# Patient Record
Sex: MALE | Race: WHITE | Employment: FULL TIME | URBAN - NONMETROPOLITAN AREA
[De-identification: names, ages, dates, MRNs, and addresses within clinical notes are randomized per-mention and may not be internally consistent; named-entity substitution may affect disease eponyms.]

---

## 2017-01-01 ENCOUNTER — HOSPITAL ENCOUNTER (OUTPATIENT)
Dept: CARDIAC REHAB | Age: 52
Discharge: HOME | End: 2017-01-31

## 2017-01-03 ENCOUNTER — OFFICE VISIT (OUTPATIENT)
Dept: INTERNAL MEDICINE CLINIC | Age: 52
End: 2017-01-03

## 2017-01-03 ENCOUNTER — HOSPITAL ENCOUNTER (OUTPATIENT)
Dept: GENERAL RADIOLOGY | Age: 52
Discharge: HOME | End: 2017-01-03
Attending: INTERNAL MEDICINE | Admitting: INTERNAL MEDICINE

## 2017-01-03 VITALS
TEMPERATURE: 97.7 F | HEART RATE: 72 BPM | OXYGEN SATURATION: 96 % | WEIGHT: 243 LBS | RESPIRATION RATE: 13 BRPM | SYSTOLIC BLOOD PRESSURE: 128 MMHG | BODY MASS INDEX: 34.79 KG/M2 | HEIGHT: 70 IN | DIASTOLIC BLOOD PRESSURE: 70 MMHG

## 2017-01-03 DIAGNOSIS — I10 ESSENTIAL HYPERTENSION: ICD-10-CM

## 2017-01-03 DIAGNOSIS — J06.9 ACUTE UPPER RESPIRATORY INFECTIONS OF OTHER MULTIPLE SITES: ICD-10-CM

## 2017-01-03 DIAGNOSIS — E78.2 MIXED HYPERLIPIDEMIA: ICD-10-CM

## 2017-01-03 DIAGNOSIS — J06.9 UPPER RESPIRATORY TRACT INFECTION, UNSPECIFIED TYPE: Primary | ICD-10-CM

## 2017-01-03 DIAGNOSIS — R07.9 CHEST PAIN, UNSPECIFIED TYPE: ICD-10-CM

## 2017-01-03 LAB
BASOPHILS ABSOLUTE: 0.1 K/CU MM
BASOPHILS RELATIVE PERCENT: 0.9 % (ref 0–1)
D DIMER: 312 NG/ML(DDU)
DIFFERENTIAL TYPE: ABNORMAL
EOSINOPHILS ABSOLUTE: 0.2 K/CU MM
EOSINOPHILS RELATIVE PERCENT: 2.8 % (ref 0–3)
HCT VFR BLD CALC: 46.2 % (ref 42–52)
HEMOGLOBIN: 15.2 GM/DL (ref 13.5–18)
IMMATURE NEUTROPHIL %: 0.7 % (ref 0–0.43)
LYMPHOCYTES ABSOLUTE: 2.8 K/CU MM
LYMPHOCYTES RELATIVE PERCENT: 34.5 % (ref 24–44)
MCH RBC QN AUTO: 29.6 PG (ref 27–31)
MCHC RBC AUTO-ENTMCNC: 32.9 % (ref 32–36)
MCV RBC AUTO: 90.1 FL (ref 78–100)
MONOCYTES ABSOLUTE: 0.8 K/CU MM
MONOCYTES RELATIVE PERCENT: 9.2 % (ref 0–4)
PDW BLD-RTO: 12.1 % (ref 11.7–14.9)
PLATELET # BLD: 338 K/CU MM (ref 140–440)
PMV BLD AUTO: 8.9 FL (ref 7.5–11.1)
RBC # BLD: 5.13 M/CU MM (ref 4.6–6.2)
SEGMENTED NEUTROPHILS ABSOLUTE COUNT: 4.2 K/CU MM
SEGMENTED NEUTROPHILS RELATIVE PERCENT: 51.9 % (ref 36–66)
TOTAL IMMATURE NEUTOROPHIL: 0.06 K/CU MM
WBC # BLD: 8.1 K/CU MM (ref 4–10.5)

## 2017-01-03 PROCEDURE — 93000 ELECTROCARDIOGRAM COMPLETE: CPT | Performed by: INTERNAL MEDICINE

## 2017-01-03 PROCEDURE — 99213 OFFICE O/P EST LOW 20 MIN: CPT | Performed by: INTERNAL MEDICINE

## 2017-01-03 RX ORDER — CARVEDILOL 6.25 MG/1
6.25 TABLET ORAL 2 TIMES DAILY
Qty: 60 TABLET | Refills: 5 | Status: SHIPPED | OUTPATIENT
Start: 2017-01-03 | End: 2017-07-12 | Stop reason: SDUPTHER

## 2017-01-03 RX ORDER — ASPIRIN 81 MG/1
81 TABLET ORAL DAILY
Qty: 30 TABLET | Refills: 5 | Status: SHIPPED | OUTPATIENT
Start: 2017-01-03

## 2017-01-03 RX ORDER — HYDROCHLOROTHIAZIDE 12.5 MG/1
12.5 CAPSULE, GELATIN COATED ORAL DAILY
Qty: 30 CAPSULE | Refills: 5 | Status: SHIPPED | OUTPATIENT
Start: 2017-01-03 | End: 2017-02-02 | Stop reason: ALTCHOICE

## 2017-01-03 RX ORDER — AZITHROMYCIN 250 MG/1
TABLET, FILM COATED ORAL
Qty: 1 PACKET | Refills: 0 | Status: SHIPPED | OUTPATIENT
Start: 2017-01-03 | End: 2017-01-11 | Stop reason: ALTCHOICE

## 2017-01-03 RX ORDER — ATORVASTATIN CALCIUM 40 MG/1
40 TABLET, FILM COATED ORAL DAILY
Qty: 30 TABLET | Refills: 5 | Status: SHIPPED | OUTPATIENT
Start: 2017-01-03 | End: 2017-07-12 | Stop reason: SDUPTHER

## 2017-01-03 ASSESSMENT — PATIENT HEALTH QUESTIONNAIRE - PHQ9
2. FEELING DOWN, DEPRESSED OR HOPELESS: 1
SUM OF ALL RESPONSES TO PHQ9 QUESTIONS 1 & 2: 2
SUM OF ALL RESPONSES TO PHQ QUESTIONS 1-9: 2
1. LITTLE INTEREST OR PLEASURE IN DOING THINGS: 1

## 2017-01-11 ENCOUNTER — OFFICE VISIT (OUTPATIENT)
Dept: INTERNAL MEDICINE CLINIC | Age: 52
End: 2017-01-11

## 2017-01-11 VITALS
TEMPERATURE: 97.5 F | SYSTOLIC BLOOD PRESSURE: 118 MMHG | BODY MASS INDEX: 34.76 KG/M2 | HEIGHT: 70 IN | RESPIRATION RATE: 16 BRPM | HEART RATE: 72 BPM | DIASTOLIC BLOOD PRESSURE: 68 MMHG | OXYGEN SATURATION: 94 % | WEIGHT: 242.8 LBS

## 2017-01-11 DIAGNOSIS — M25.511 RIGHT SHOULDER PAIN, UNSPECIFIED CHRONICITY: ICD-10-CM

## 2017-01-11 DIAGNOSIS — I25.10 CORONARY ARTERY DISEASE INVOLVING NATIVE CORONARY ARTERY OF NATIVE HEART WITHOUT ANGINA PECTORIS: ICD-10-CM

## 2017-01-11 DIAGNOSIS — E78.2 MIXED HYPERLIPIDEMIA: ICD-10-CM

## 2017-01-11 DIAGNOSIS — I77.9 BILATERAL CAROTID ARTERY DISEASE (HCC): ICD-10-CM

## 2017-01-11 DIAGNOSIS — Z23 NEED FOR PROPHYLACTIC VACCINATION AND INOCULATION AGAINST INFLUENZA: ICD-10-CM

## 2017-01-11 DIAGNOSIS — Z12.5 SCREENING FOR PROSTATE CANCER: ICD-10-CM

## 2017-01-11 DIAGNOSIS — R07.9 CHEST PAIN, UNSPECIFIED TYPE: Primary | ICD-10-CM

## 2017-01-11 DIAGNOSIS — I10 ESSENTIAL HYPERTENSION: ICD-10-CM

## 2017-01-11 DIAGNOSIS — R91.1 INCIDENTAL LUNG NODULE: ICD-10-CM

## 2017-01-11 DIAGNOSIS — Z72.0 TOBACCO ABUSE DISORDER: ICD-10-CM

## 2017-01-11 PROCEDURE — 99213 OFFICE O/P EST LOW 20 MIN: CPT | Performed by: INTERNAL MEDICINE

## 2017-01-11 PROCEDURE — 90630 INFLUENZA, QUADRIVALENT, INTRADERMAL, PF (FLUZONE QUADRIVALENT PF ID): CPT | Performed by: INTERNAL MEDICINE

## 2017-01-11 PROCEDURE — 90471 IMMUNIZATION ADMIN: CPT | Performed by: INTERNAL MEDICINE

## 2017-01-11 RX ORDER — AMLODIPINE BESYLATE 5 MG/1
5 TABLET ORAL DAILY
Qty: 30 TABLET | Refills: 5 | Status: SHIPPED | OUTPATIENT
Start: 2017-01-11 | End: 2017-07-12 | Stop reason: SDUPTHER

## 2017-01-11 ASSESSMENT — ENCOUNTER SYMPTOMS
EYES NEGATIVE: 1
RESPIRATORY NEGATIVE: 1
SHORTNESS OF BREATH: 0
GASTROINTESTINAL NEGATIVE: 1

## 2017-02-01 ENCOUNTER — HOSPITAL ENCOUNTER (OUTPATIENT)
Dept: CARDIAC REHAB | Age: 52
Discharge: HOME | End: 2017-02-28

## 2017-02-02 ENCOUNTER — OFFICE VISIT (OUTPATIENT)
Dept: CARDIOLOGY CLINIC | Age: 52
End: 2017-02-02

## 2017-02-02 VITALS
DIASTOLIC BLOOD PRESSURE: 72 MMHG | BODY MASS INDEX: 34.22 KG/M2 | RESPIRATION RATE: 16 BRPM | SYSTOLIC BLOOD PRESSURE: 112 MMHG | WEIGHT: 239 LBS | HEIGHT: 70 IN | HEART RATE: 68 BPM

## 2017-02-02 DIAGNOSIS — M10.272 ACUTE DRUG-INDUCED GOUT OF LEFT ANKLE: ICD-10-CM

## 2017-02-02 DIAGNOSIS — Z72.0 TOBACCO ABUSE DISORDER: ICD-10-CM

## 2017-02-02 DIAGNOSIS — I10 ESSENTIAL HYPERTENSION: ICD-10-CM

## 2017-02-02 DIAGNOSIS — I25.10 CORONARY ARTERY DISEASE INVOLVING NATIVE CORONARY ARTERY OF NATIVE HEART WITHOUT ANGINA PECTORIS: Primary | ICD-10-CM

## 2017-02-02 DIAGNOSIS — R91.1 INCIDENTAL LUNG NODULE: ICD-10-CM

## 2017-02-02 DIAGNOSIS — I77.9 BILATERAL CAROTID ARTERY DISEASE (HCC): ICD-10-CM

## 2017-02-02 DIAGNOSIS — E78.2 MIXED HYPERLIPIDEMIA: ICD-10-CM

## 2017-02-02 PROBLEM — M10.9 ACUTE GOUT OF LEFT ANKLE: Status: ACTIVE | Noted: 2017-02-02

## 2017-02-02 PROCEDURE — 99214 OFFICE O/P EST MOD 30 MIN: CPT | Performed by: INTERNAL MEDICINE

## 2017-02-02 RX ORDER — LISINOPRIL 5 MG/1
5 TABLET ORAL DAILY
Qty: 30 TABLET | Refills: 3 | Status: SHIPPED | OUTPATIENT
Start: 2017-02-02 | End: 2017-07-12 | Stop reason: SDUPTHER

## 2017-02-02 RX ORDER — COLCHICINE 0.6 MG/1
0.6 TABLET ORAL DAILY
Qty: 30 TABLET | Refills: 3 | Status: SHIPPED | OUTPATIENT
Start: 2017-02-02 | End: 2017-04-14 | Stop reason: ALTCHOICE

## 2017-02-03 ENCOUNTER — HOSPITAL ENCOUNTER (OUTPATIENT)
Dept: GENERAL RADIOLOGY | Age: 52
Discharge: HOME | End: 2017-02-03
Attending: INTERNAL MEDICINE | Admitting: INTERNAL MEDICINE

## 2017-02-03 DIAGNOSIS — M10.272 ACUTE DRUG-INDUCED GOUT INVOLVING TOE OF LEFT FOOT: ICD-10-CM

## 2017-02-03 LAB
ALBUMIN SERPL-MCNC: 4.3 GM/DL (ref 3.4–5)
ALP BLD-CCNC: 64 IU/L (ref 40–129)
ALT SERPL-CCNC: 17 U/L (ref 10–40)
ANION GAP SERPL CALCULATED.3IONS-SCNC: 5 MMOL/L (ref 4–16)
AST SERPL-CCNC: 20 IU/L (ref 15–37)
BILIRUB SERPL-MCNC: 0.8 MG/DL (ref 0–1)
BUN BLDV-MCNC: 18 MG/DL (ref 6–23)
CALCIUM SERPL-MCNC: 9.5 MG/DL (ref 8.3–10.6)
CHLORIDE BLD-SCNC: 103 MMOL/L (ref 99–110)
CHOLESTEROL: 115 MG/DL
CO2: 35 MMOL/L (ref 21–32)
CREAT SERPL-MCNC: 1.1 MG/DL (ref 0.9–1.3)
GFR AFRICAN AMERICAN: >60 ML/MIN/1.73M2
GFR NON-AFRICAN AMERICAN: >60 ML/MIN/1.73M2
GLUCOSE FASTING: 110 MG/DL (ref 70–99)
HDLC SERPL-MCNC: 43 MG/DL
LDL CHOLESTEROL DIRECT: 63 MG/DL
POTASSIUM SERPL-SCNC: 4.3 MMOL/L (ref 3.5–5.1)
PROSTATE SPECIFIC ANTIGEN: 0.54 NG/ML (ref 0–4)
SODIUM BLD-SCNC: 143 MMOL/L (ref 135–145)
TOTAL PROTEIN: 6.4 GM/DL (ref 6.4–8.2)
TRIGL SERPL-MCNC: 64 MG/DL
URIC ACID: 8.4 MG/DL (ref 3.5–7.2)

## 2017-02-14 ENCOUNTER — TELEPHONE (OUTPATIENT)
Dept: CARDIOLOGY CLINIC | Age: 52
End: 2017-02-14

## 2017-02-22 ENCOUNTER — TELEPHONE (OUTPATIENT)
Dept: CARDIOLOGY CLINIC | Age: 52
End: 2017-02-22

## 2017-03-01 ENCOUNTER — HOSPITAL ENCOUNTER (OUTPATIENT)
Dept: CARDIAC REHAB | Age: 52
Discharge: HOME | End: 2017-03-31
Admitting: INTERNAL MEDICINE

## 2017-04-01 ENCOUNTER — HOSPITAL ENCOUNTER (OUTPATIENT)
Dept: CARDIAC REHAB | Age: 52
Discharge: HOME | End: 2017-04-04
Attending: INTERNAL MEDICINE | Admitting: INTERNAL MEDICINE

## 2017-04-14 ENCOUNTER — OFFICE VISIT (OUTPATIENT)
Dept: INTERNAL MEDICINE CLINIC | Age: 52
End: 2017-04-14

## 2017-04-14 VITALS
WEIGHT: 243.2 LBS | OXYGEN SATURATION: 94 % | SYSTOLIC BLOOD PRESSURE: 134 MMHG | HEIGHT: 70 IN | BODY MASS INDEX: 34.82 KG/M2 | RESPIRATION RATE: 12 BRPM | HEART RATE: 76 BPM | DIASTOLIC BLOOD PRESSURE: 74 MMHG | TEMPERATURE: 97.8 F

## 2017-04-14 DIAGNOSIS — E78.2 MIXED HYPERLIPIDEMIA: ICD-10-CM

## 2017-04-14 DIAGNOSIS — Z72.0 TOBACCO ABUSE DISORDER: ICD-10-CM

## 2017-04-14 DIAGNOSIS — M10.272 ACUTE DRUG-INDUCED GOUT OF LEFT ANKLE: ICD-10-CM

## 2017-04-14 DIAGNOSIS — I77.9 BILATERAL CAROTID ARTERY DISEASE (HCC): ICD-10-CM

## 2017-04-14 DIAGNOSIS — I10 ESSENTIAL HYPERTENSION: Primary | ICD-10-CM

## 2017-04-14 DIAGNOSIS — R91.1 INCIDENTAL LUNG NODULE: ICD-10-CM

## 2017-04-14 DIAGNOSIS — N20.0 KIDNEY STONE ON RIGHT SIDE: ICD-10-CM

## 2017-04-14 DIAGNOSIS — I25.10 CORONARY ARTERY DISEASE INVOLVING NATIVE CORONARY ARTERY OF NATIVE HEART WITHOUT ANGINA PECTORIS: ICD-10-CM

## 2017-04-14 PROCEDURE — 99214 OFFICE O/P EST MOD 30 MIN: CPT | Performed by: INTERNAL MEDICINE

## 2017-04-14 ASSESSMENT — ENCOUNTER SYMPTOMS
EYES NEGATIVE: 1
RESPIRATORY NEGATIVE: 1
GASTROINTESTINAL NEGATIVE: 1

## 2017-04-19 ENCOUNTER — HOSPITAL ENCOUNTER (OUTPATIENT)
Dept: CT IMAGING | Age: 52
Discharge: HOME | End: 2017-05-18
Attending: INTERNAL MEDICINE | Admitting: INTERNAL MEDICINE

## 2017-05-12 ENCOUNTER — HOSPITAL ENCOUNTER (OUTPATIENT)
Dept: LAB | Age: 52
Discharge: HOME | End: 2017-05-12
Attending: INTERNAL MEDICINE | Admitting: INTERNAL MEDICINE

## 2017-05-12 LAB
ANION GAP SERPL CALCULATED.3IONS-SCNC: 9 MMOL/L (ref 4–16)
BUN BLDV-MCNC: 20 MG/DL (ref 6–23)
CALCIUM SERPL-MCNC: 9.5 MG/DL (ref 8.3–10.6)
CHLORIDE BLD-SCNC: 106 MMOL/L (ref 99–110)
CO2: 29 MMOL/L (ref 21–32)
CREAT SERPL-MCNC: 1 MG/DL (ref 0.9–1.3)
GFR AFRICAN AMERICAN: >60 ML/MIN/1.73M2
GFR NON-AFRICAN AMERICAN: >60 ML/MIN/1.73M2
GLUCOSE BLD-MCNC: 116 MG/DL (ref 70–140)
POTASSIUM SERPL-SCNC: 4.1 MMOL/L (ref 3.5–5.1)
SODIUM BLD-SCNC: 144 MMOL/L (ref 135–145)
URIC ACID: 7.6 MG/DL (ref 3.5–7.2)

## 2017-07-12 RX ORDER — CARVEDILOL 6.25 MG/1
6.25 TABLET ORAL 2 TIMES DAILY
Qty: 60 TABLET | Refills: 5 | Status: SHIPPED | OUTPATIENT
Start: 2017-07-12 | End: 2018-07-05 | Stop reason: SDUPTHER

## 2017-07-12 RX ORDER — ATORVASTATIN CALCIUM 40 MG/1
40 TABLET, FILM COATED ORAL DAILY
Qty: 30 TABLET | Refills: 5 | Status: SHIPPED | OUTPATIENT
Start: 2017-07-12 | End: 2018-07-05 | Stop reason: SDUPTHER

## 2017-07-12 RX ORDER — LISINOPRIL 5 MG/1
5 TABLET ORAL DAILY
Qty: 30 TABLET | Refills: 5 | Status: SHIPPED | OUTPATIENT
Start: 2017-07-12 | End: 2018-03-12 | Stop reason: SDUPTHER

## 2017-07-12 RX ORDER — AMLODIPINE BESYLATE 5 MG/1
5 TABLET ORAL DAILY
Qty: 30 TABLET | Refills: 5 | Status: SHIPPED | OUTPATIENT
Start: 2017-07-12 | End: 2017-11-09 | Stop reason: SDUPTHER

## 2017-08-23 ENCOUNTER — OFFICE VISIT (OUTPATIENT)
Dept: INTERNAL MEDICINE CLINIC | Age: 52
End: 2017-08-23

## 2017-08-23 VITALS
DIASTOLIC BLOOD PRESSURE: 78 MMHG | WEIGHT: 234 LBS | HEART RATE: 76 BPM | HEIGHT: 70 IN | BODY MASS INDEX: 33.5 KG/M2 | OXYGEN SATURATION: 95 % | RESPIRATION RATE: 12 BRPM | SYSTOLIC BLOOD PRESSURE: 134 MMHG | TEMPERATURE: 98 F

## 2017-08-23 DIAGNOSIS — E78.2 MIXED HYPERLIPIDEMIA: ICD-10-CM

## 2017-08-23 DIAGNOSIS — I77.9 BILATERAL CAROTID ARTERY DISEASE (HCC): ICD-10-CM

## 2017-08-23 DIAGNOSIS — N20.0 KIDNEY STONE ON RIGHT SIDE: ICD-10-CM

## 2017-08-23 DIAGNOSIS — I25.10 CORONARY ARTERY DISEASE INVOLVING NATIVE CORONARY ARTERY OF NATIVE HEART WITHOUT ANGINA PECTORIS: ICD-10-CM

## 2017-08-23 DIAGNOSIS — I10 ESSENTIAL HYPERTENSION: ICD-10-CM

## 2017-08-23 DIAGNOSIS — R91.1 INCIDENTAL LUNG NODULE: ICD-10-CM

## 2017-08-23 DIAGNOSIS — Z72.0 TOBACCO ABUSE DISORDER: ICD-10-CM

## 2017-08-23 DIAGNOSIS — M10.272 ACUTE DRUG-INDUCED GOUT OF LEFT ANKLE: ICD-10-CM

## 2017-08-23 DIAGNOSIS — M25.511 RIGHT SHOULDER PAIN, UNSPECIFIED CHRONICITY: ICD-10-CM

## 2017-08-23 PROCEDURE — 99213 OFFICE O/P EST LOW 20 MIN: CPT | Performed by: INTERNAL MEDICINE

## 2017-08-23 ASSESSMENT — ENCOUNTER SYMPTOMS
ORTHOPNEA: 0
COUGH: 0
SPUTUM PRODUCTION: 0
ABDOMINAL PAIN: 0
GASTROINTESTINAL NEGATIVE: 1
VOMITING: 0

## 2017-09-05 ENCOUNTER — HOSPITAL ENCOUNTER (OUTPATIENT)
Dept: CT IMAGING | Age: 52
Discharge: HOME | End: 2017-09-05
Attending: INTERNAL MEDICINE | Admitting: INTERNAL MEDICINE

## 2017-09-05 DIAGNOSIS — R91.1 COIN LESION: ICD-10-CM

## 2017-09-05 LAB
ALBUMIN SERPL-MCNC: 4.3 GM/DL (ref 3.4–5)
ALP BLD-CCNC: 71 IU/L (ref 40–129)
ALT SERPL-CCNC: 19 U/L (ref 10–40)
ANION GAP SERPL CALCULATED.3IONS-SCNC: 7 MMOL/L (ref 4–16)
AST SERPL-CCNC: 17 IU/L (ref 15–37)
BILIRUB SERPL-MCNC: 0.3 MG/DL (ref 0–1)
BUN BLDV-MCNC: 14 MG/DL (ref 6–23)
CALCIUM SERPL-MCNC: 9.3 MG/DL (ref 8.3–10.6)
CHLORIDE BLD-SCNC: 103 MMOL/L (ref 99–110)
CHOLESTEROL, FASTING: 154 MG/DL
CO2: 36 MMOL/L (ref 21–32)
CREAT SERPL-MCNC: 1 MG/DL (ref 0.9–1.3)
GFR AFRICAN AMERICAN: >60 ML/MIN/1.73M2
GFR NON-AFRICAN AMERICAN: >60 ML/MIN/1.73M2
GLUCOSE FASTING: 119 MG/DL (ref 70–99)
HDLC SERPL-MCNC: 45 MG/DL
LDL CHOLESTEROL DIRECT: 87 MG/DL
POTASSIUM SERPL-SCNC: 4 MMOL/L (ref 3.5–5.1)
SODIUM BLD-SCNC: 146 MMOL/L (ref 135–145)
TOTAL PROTEIN: 6.6 GM/DL (ref 6.4–8.2)
TRIGLYCERIDE, FASTING: 119 MG/DL

## 2017-11-09 RX ORDER — AMLODIPINE BESYLATE 5 MG/1
5 TABLET ORAL DAILY
Qty: 30 TABLET | Refills: 5 | Status: SHIPPED | OUTPATIENT
Start: 2017-11-09 | End: 2018-07-05 | Stop reason: SDUPTHER

## 2017-12-04 ENCOUNTER — OFFICE VISIT (OUTPATIENT)
Dept: INTERNAL MEDICINE CLINIC | Age: 52
End: 2017-12-04

## 2017-12-04 VITALS
SYSTOLIC BLOOD PRESSURE: 118 MMHG | BODY MASS INDEX: 34.03 KG/M2 | WEIGHT: 237.2 LBS | HEART RATE: 72 BPM | TEMPERATURE: 98.2 F | DIASTOLIC BLOOD PRESSURE: 80 MMHG | RESPIRATION RATE: 16 BRPM

## 2017-12-04 DIAGNOSIS — I10 ESSENTIAL HYPERTENSION: Primary | ICD-10-CM

## 2017-12-04 DIAGNOSIS — E78.2 MIXED HYPERLIPIDEMIA: ICD-10-CM

## 2017-12-04 DIAGNOSIS — Z23 NEED FOR PROPHYLACTIC VACCINATION AND INOCULATION AGAINST INFLUENZA: ICD-10-CM

## 2017-12-04 DIAGNOSIS — Z72.89 OTHER PROBLEMS RELATED TO LIFESTYLE: ICD-10-CM

## 2017-12-04 DIAGNOSIS — Z12.5 SCREENING FOR PROSTATE CANCER: ICD-10-CM

## 2017-12-04 DIAGNOSIS — I77.9 BILATERAL CAROTID ARTERY DISEASE (HCC): ICD-10-CM

## 2017-12-04 DIAGNOSIS — R91.1 INCIDENTAL LUNG NODULE: ICD-10-CM

## 2017-12-04 DIAGNOSIS — N20.0 KIDNEY STONE ON RIGHT SIDE: ICD-10-CM

## 2017-12-04 DIAGNOSIS — Z72.0 TOBACCO ABUSE DISORDER: ICD-10-CM

## 2017-12-04 DIAGNOSIS — I25.10 CORONARY ARTERY DISEASE INVOLVING NATIVE CORONARY ARTERY OF NATIVE HEART WITHOUT ANGINA PECTORIS: ICD-10-CM

## 2017-12-04 DIAGNOSIS — M10.272 ACUTE DRUG-INDUCED GOUT OF LEFT ANKLE: ICD-10-CM

## 2017-12-04 PROBLEM — M10.9 ACUTE GOUT OF LEFT ANKLE: Status: RESOLVED | Noted: 2017-02-02 | Resolved: 2017-12-04

## 2017-12-04 PROCEDURE — 90471 IMMUNIZATION ADMIN: CPT | Performed by: INTERNAL MEDICINE

## 2017-12-04 PROCEDURE — G8598 ASA/ANTIPLAT THER USED: HCPCS | Performed by: INTERNAL MEDICINE

## 2017-12-04 PROCEDURE — G8484 FLU IMMUNIZE NO ADMIN: HCPCS | Performed by: INTERNAL MEDICINE

## 2017-12-04 PROCEDURE — 90686 IIV4 VACC NO PRSV 0.5 ML IM: CPT | Performed by: INTERNAL MEDICINE

## 2017-12-04 PROCEDURE — 99213 OFFICE O/P EST LOW 20 MIN: CPT | Performed by: INTERNAL MEDICINE

## 2017-12-04 PROCEDURE — 3017F COLORECTAL CA SCREEN DOC REV: CPT | Performed by: INTERNAL MEDICINE

## 2017-12-04 PROCEDURE — G8427 DOCREV CUR MEDS BY ELIG CLIN: HCPCS | Performed by: INTERNAL MEDICINE

## 2017-12-04 PROCEDURE — G8417 CALC BMI ABV UP PARAM F/U: HCPCS | Performed by: INTERNAL MEDICINE

## 2017-12-04 PROCEDURE — 4004F PT TOBACCO SCREEN RCVD TLK: CPT | Performed by: INTERNAL MEDICINE

## 2017-12-04 ASSESSMENT — ENCOUNTER SYMPTOMS
RESPIRATORY NEGATIVE: 1
COUGH: 0
EYES NEGATIVE: 1
GASTROINTESTINAL NEGATIVE: 1
SPUTUM PRODUCTION: 0
SHORTNESS OF BREATH: 0
HEMOPTYSIS: 0

## 2017-12-04 NOTE — PATIENT INSTRUCTIONS
Vaccine Information Sheet, \"Influenza - Inactivated\"  given to Ángela Murcia, or parent/legal guardian of  Ángela Murcia and verbalized understanding. Patient responses:    Have you ever had a reaction to a flu vaccine? No   Are you able to eat eggs without adverse effects? Yes  Do you have any current illness? no  Have you ever had [place] Syndrome?  no  Flu vaccine given per order. Please see immunization tab.

## 2017-12-04 NOTE — PROGRESS NOTES
Young Salgaod  Patient's  is 1965  Seen in office on 2017      SUBJECTIVE:  John Bernstein is a 45 y. o.year old male presents today   Chief Complaint   Patient presents with    Hypertension    Hyperlipidemia     Pt is here for f/u of HTN, HLD  Pt has cold congestion and that is getting better  No fever or chills  Pt had lung nodule followed for 2 years no change in nodule there fore benign per last CT chest in   Pt has not seen Dr Josephine Hudson in f/u for carotid art disease and is going to call and make appt. Taking medications regularly. No side effects noted. Review of Systems   Constitutional: Negative. Negative for chills, fever and weight loss. HENT: Negative. Eyes: Negative. Respiratory: Negative. Negative for cough, hemoptysis, sputum production and shortness of breath. Cardiovascular: Negative. Gastrointestinal: Negative. Genitourinary: Negative. Skin: Negative. Neurological: Negative. Endo/Heme/Allergies: Negative. Psychiatric/Behavioral: Negative. OBJECTIVE: /80   Pulse 72   Temp 98.2 °F (36.8 °C) (Oral)   Resp 16   Wt 237 lb 3.2 oz (107.6 kg)   BMI 34.03 kg/m²     Wt Readings from Last 3 Encounters:   17 237 lb 3.2 oz (107.6 kg)   17 234 lb (106.1 kg)   17 243 lb 3.2 oz (110.3 kg)      GENERAL:  Alert, oriented, pleasant, in no apparent distress. HEENT:  Conjunctiva pink, no scleral icterus. ENT clear. NECK:  Supple. No jugular venous distention noted. No masses felt,  CARDIOVASCULAR:  Normal S1 and S2    PULMONARY:  No respiratory distress. No wheezes or rales. ABDOMEN:  Soft and non-tender,no masses  or organomegaly. EXTREMITIES:  No cyanosis, clubbing, or significant edema. SKIN: Skin is warm and dry. NEUROLOGICAL:  Cranial nerves II through XII are grossly intact. IMPRESSION:    Encounter Diagnoses   Name Primary?     Essential hypertension Yes    Coronary artery disease s/p MI, CABG x 4    

## 2017-12-04 NOTE — ASSESSMENT & PLAN NOTE
Hyperlipidemia is stable. Follow low cholesterol diet. Continue current treatment. Pt is on atorvastatin.

## 2017-12-04 NOTE — ASSESSMENT & PLAN NOTE
Ct shoulder showed 4 mm nodule in RUL in 4/15  CT scan of the chest showed in a 5 mm nodule stable  CT chest 9/16: 5 mm nodule stable . CT chest 9/17 : stable nodule for 2 years. . There for benign  Impression   1. Stable right upper lobe nodule since 09/2015 compatible with a benign   etiology   2.  Bronchial wall thickening should be correlated with any clinical findings   of acute or chronic bronchitis

## 2018-02-01 ENCOUNTER — HOSPITAL ENCOUNTER (OUTPATIENT)
Dept: LAB | Age: 53
Discharge: HOME | End: 2018-02-01
Attending: INTERNAL MEDICINE | Admitting: INTERNAL MEDICINE

## 2018-02-01 LAB
ALBUMIN SERPL-MCNC: 4.3 GM/DL (ref 3.4–5)
ALP BLD-CCNC: 68 IU/L (ref 40–129)
ALT SERPL-CCNC: 15 U/L (ref 10–40)
ANION GAP SERPL CALCULATED.3IONS-SCNC: 6 MMOL/L (ref 4–16)
AST SERPL-CCNC: 15 IU/L (ref 15–37)
BILIRUB SERPL-MCNC: 0.5 MG/DL (ref 0–1)
BUN BLDV-MCNC: 17 MG/DL (ref 6–23)
CALCIUM SERPL-MCNC: 9.8 MG/DL (ref 8.3–10.6)
CHLORIDE BLD-SCNC: 103 MMOL/L (ref 99–110)
CHOLESTEROL, FASTING: 119 MG/DL
CO2: 38 MMOL/L (ref 21–32)
CREAT SERPL-MCNC: 1 MG/DL (ref 0.9–1.3)
GFR AFRICAN AMERICAN: >60 ML/MIN/1.73M2
GFR NON-AFRICAN AMERICAN: >60 ML/MIN/1.73M2
GLUCOSE FASTING: 117 MG/DL (ref 70–99)
HDLC SERPL-MCNC: 40 MG/DL
HEPATITIS C ANTIBODY: NON REACTIVE
HIV SCREEN: NON REACTIVE
LDL CHOLESTEROL DIRECT: 69 MG/DL
POTASSIUM SERPL-SCNC: 4.9 MMOL/L (ref 3.5–5.1)
PROSTATE SPECIFIC ANTIGEN: 0.48 NG/ML (ref 0–4)
SODIUM BLD-SCNC: 147 MMOL/L (ref 135–145)
TOTAL PROTEIN: 6.9 GM/DL (ref 6.4–8.2)
TRIGLYCERIDE, FASTING: 102 MG/DL

## 2018-03-12 RX ORDER — LISINOPRIL 5 MG/1
5 TABLET ORAL DAILY
Qty: 30 TABLET | Refills: 5 | Status: SHIPPED | OUTPATIENT
Start: 2018-03-12 | End: 2018-07-05 | Stop reason: SDUPTHER

## 2018-04-04 ENCOUNTER — OFFICE VISIT (OUTPATIENT)
Dept: INTERNAL MEDICINE CLINIC | Age: 53
End: 2018-04-04

## 2018-04-04 VITALS
OXYGEN SATURATION: 93 % | HEART RATE: 86 BPM | HEIGHT: 70 IN | TEMPERATURE: 97.9 F | RESPIRATION RATE: 16 BRPM | SYSTOLIC BLOOD PRESSURE: 126 MMHG | WEIGHT: 238.2 LBS | DIASTOLIC BLOOD PRESSURE: 80 MMHG | BODY MASS INDEX: 34.1 KG/M2

## 2018-04-04 DIAGNOSIS — I10 ESSENTIAL HYPERTENSION: Primary | ICD-10-CM

## 2018-04-04 DIAGNOSIS — E78.2 MIXED HYPERLIPIDEMIA: ICD-10-CM

## 2018-04-04 DIAGNOSIS — I25.10 CORONARY ARTERY DISEASE INVOLVING NATIVE CORONARY ARTERY OF NATIVE HEART WITHOUT ANGINA PECTORIS: ICD-10-CM

## 2018-04-04 DIAGNOSIS — N20.0 KIDNEY STONES: ICD-10-CM

## 2018-04-04 DIAGNOSIS — N20.0 KIDNEY STONE ON RIGHT SIDE: ICD-10-CM

## 2018-04-04 DIAGNOSIS — R91.1 INCIDENTAL LUNG NODULE: ICD-10-CM

## 2018-04-04 DIAGNOSIS — I77.9 BILATERAL CAROTID ARTERY DISEASE (HCC): ICD-10-CM

## 2018-04-04 DIAGNOSIS — Z72.0 TOBACCO ABUSE DISORDER: ICD-10-CM

## 2018-04-04 LAB
BILIRUBIN, POC: NEGATIVE
BLOOD URINE, POC: ABNORMAL
CLARITY, POC: CLEAR
COLOR, POC: YELLOW
GLUCOSE URINE, POC: NEGATIVE
KETONES, POC: NEGATIVE
LEUKOCYTE EST, POC: ABNORMAL
NITRITE, POC: NEGATIVE
PH, POC: 5.5
PROTEIN, POC: ABNORMAL
SPECIFIC GRAVITY, POC: 1.02
UROBILINOGEN, POC: ABNORMAL

## 2018-04-04 PROCEDURE — G8599 NO ASA/ANTIPLAT THER USE RNG: HCPCS | Performed by: INTERNAL MEDICINE

## 2018-04-04 PROCEDURE — G8417 CALC BMI ABV UP PARAM F/U: HCPCS | Performed by: INTERNAL MEDICINE

## 2018-04-04 PROCEDURE — 3017F COLORECTAL CA SCREEN DOC REV: CPT | Performed by: INTERNAL MEDICINE

## 2018-04-04 PROCEDURE — 81002 URINALYSIS NONAUTO W/O SCOPE: CPT | Performed by: INTERNAL MEDICINE

## 2018-04-04 PROCEDURE — G8427 DOCREV CUR MEDS BY ELIG CLIN: HCPCS | Performed by: INTERNAL MEDICINE

## 2018-04-04 PROCEDURE — 4004F PT TOBACCO SCREEN RCVD TLK: CPT | Performed by: INTERNAL MEDICINE

## 2018-04-04 PROCEDURE — 99213 OFFICE O/P EST LOW 20 MIN: CPT | Performed by: INTERNAL MEDICINE

## 2018-04-04 ASSESSMENT — PATIENT HEALTH QUESTIONNAIRE - PHQ9
SUM OF ALL RESPONSES TO PHQ QUESTIONS 1-9: 2
2. FEELING DOWN, DEPRESSED OR HOPELESS: 1
SUM OF ALL RESPONSES TO PHQ9 QUESTIONS 1 & 2: 2
1. LITTLE INTEREST OR PLEASURE IN DOING THINGS: 1

## 2018-04-04 ASSESSMENT — ENCOUNTER SYMPTOMS
GASTROINTESTINAL NEGATIVE: 1
RESPIRATORY NEGATIVE: 1
EYES NEGATIVE: 1

## 2018-04-09 ENCOUNTER — HOSPITAL ENCOUNTER (OUTPATIENT)
Dept: ULTRASOUND IMAGING | Age: 53
Discharge: HOME | End: 2018-04-09
Attending: INTERNAL MEDICINE | Admitting: INTERNAL MEDICINE

## 2018-04-09 DIAGNOSIS — N20.0 CALCULUS OF KIDNEY: ICD-10-CM

## 2018-04-09 DIAGNOSIS — N20.0 KIDNEY STONE: ICD-10-CM

## 2018-04-10 ENCOUNTER — TELEPHONE (OUTPATIENT)
Dept: INTERNAL MEDICINE CLINIC | Age: 53
End: 2018-04-10

## 2018-04-12 ENCOUNTER — HOSPITAL ENCOUNTER (OUTPATIENT)
Dept: CT IMAGING | Age: 53
Discharge: HOME | End: 2018-04-12
Attending: INTERNAL MEDICINE | Admitting: INTERNAL MEDICINE

## 2018-04-12 ENCOUNTER — OFFICE VISIT (OUTPATIENT)
Dept: INTERNAL MEDICINE CLINIC | Age: 53
End: 2018-04-12

## 2018-04-12 VITALS
BODY MASS INDEX: 34.24 KG/M2 | DIASTOLIC BLOOD PRESSURE: 80 MMHG | RESPIRATION RATE: 16 BRPM | TEMPERATURE: 97.9 F | SYSTOLIC BLOOD PRESSURE: 122 MMHG | OXYGEN SATURATION: 93 % | WEIGHT: 238.6 LBS | HEART RATE: 68 BPM

## 2018-04-12 DIAGNOSIS — R10.9 FLANK PAIN: Primary | ICD-10-CM

## 2018-04-12 DIAGNOSIS — R10.9 FLANK PAIN: ICD-10-CM

## 2018-04-12 DIAGNOSIS — R31.0 GROSS HEMATURIA: ICD-10-CM

## 2018-04-12 DIAGNOSIS — R10.9 ABDOMINAL PAIN: ICD-10-CM

## 2018-04-12 LAB
BILIRUBIN, POC: NEGATIVE
BLOOD URINE, POC: ABNORMAL
CLARITY, POC: CLEAR
COLOR, POC: YELLOW
GLUCOSE URINE, POC: NEGATIVE
KETONES, POC: NEGATIVE
LEUKOCYTE EST, POC: NEGATIVE
NITRITE, POC: ABNORMAL
PH, POC: 5
PROTEIN, POC: ABNORMAL
SPECIFIC GRAVITY, POC: 1.02
UROBILINOGEN, POC: ABNORMAL

## 2018-04-12 PROCEDURE — G8427 DOCREV CUR MEDS BY ELIG CLIN: HCPCS | Performed by: INTERNAL MEDICINE

## 2018-04-12 PROCEDURE — 81002 URINALYSIS NONAUTO W/O SCOPE: CPT | Performed by: INTERNAL MEDICINE

## 2018-04-12 PROCEDURE — G8417 CALC BMI ABV UP PARAM F/U: HCPCS | Performed by: INTERNAL MEDICINE

## 2018-04-12 PROCEDURE — 3017F COLORECTAL CA SCREEN DOC REV: CPT | Performed by: INTERNAL MEDICINE

## 2018-04-12 PROCEDURE — 4004F PT TOBACCO SCREEN RCVD TLK: CPT | Performed by: INTERNAL MEDICINE

## 2018-04-12 PROCEDURE — G8599 NO ASA/ANTIPLAT THER USE RNG: HCPCS | Performed by: INTERNAL MEDICINE

## 2018-04-12 PROCEDURE — 99213 OFFICE O/P EST LOW 20 MIN: CPT | Performed by: INTERNAL MEDICINE

## 2018-04-12 RX ORDER — HYDROCODONE BITARTRATE AND ACETAMINOPHEN 5; 325 MG/1; MG/1
1 TABLET ORAL EVERY 6 HOURS PRN
Qty: 25 TABLET | Refills: 0 | Status: SHIPPED | OUTPATIENT
Start: 2018-04-12 | End: 2018-04-19

## 2018-04-18 PROBLEM — I71.40 ABDOMINAL AORTIC ANEURYSM (AAA) WITHOUT RUPTURE: Status: ACTIVE | Noted: 2018-04-18

## 2018-04-18 RX ORDER — CIPROFLOXACIN 500 MG/1
500 TABLET, FILM COATED ORAL 2 TIMES DAILY
Qty: 14 TABLET | Refills: 0 | Status: SHIPPED | OUTPATIENT
Start: 2018-04-18 | End: 2018-04-25

## 2018-04-24 ENCOUNTER — OFFICE VISIT (OUTPATIENT)
Dept: INTERNAL MEDICINE CLINIC | Age: 53
End: 2018-04-24

## 2018-04-24 VITALS
TEMPERATURE: 97.9 F | SYSTOLIC BLOOD PRESSURE: 124 MMHG | BODY MASS INDEX: 34.58 KG/M2 | HEART RATE: 78 BPM | OXYGEN SATURATION: 92 % | DIASTOLIC BLOOD PRESSURE: 80 MMHG | WEIGHT: 241 LBS | RESPIRATION RATE: 16 BRPM

## 2018-04-24 DIAGNOSIS — I77.9 BILATERAL CAROTID ARTERY DISEASE (HCC): ICD-10-CM

## 2018-04-24 DIAGNOSIS — N20.0 KIDNEY STONE ON RIGHT SIDE: Primary | ICD-10-CM

## 2018-04-24 DIAGNOSIS — I71.40 ABDOMINAL AORTIC ANEURYSM (AAA) WITHOUT RUPTURE: ICD-10-CM

## 2018-04-24 DIAGNOSIS — E78.2 MIXED HYPERLIPIDEMIA: ICD-10-CM

## 2018-04-24 DIAGNOSIS — Z72.0 TOBACCO ABUSE DISORDER: ICD-10-CM

## 2018-04-24 DIAGNOSIS — I10 ESSENTIAL HYPERTENSION: ICD-10-CM

## 2018-04-24 DIAGNOSIS — I25.10 CORONARY ARTERY DISEASE INVOLVING NATIVE CORONARY ARTERY OF NATIVE HEART WITHOUT ANGINA PECTORIS: ICD-10-CM

## 2018-04-24 PROCEDURE — G8427 DOCREV CUR MEDS BY ELIG CLIN: HCPCS | Performed by: INTERNAL MEDICINE

## 2018-04-24 PROCEDURE — 99213 OFFICE O/P EST LOW 20 MIN: CPT | Performed by: INTERNAL MEDICINE

## 2018-04-24 PROCEDURE — G8417 CALC BMI ABV UP PARAM F/U: HCPCS | Performed by: INTERNAL MEDICINE

## 2018-04-24 PROCEDURE — 3017F COLORECTAL CA SCREEN DOC REV: CPT | Performed by: INTERNAL MEDICINE

## 2018-04-24 PROCEDURE — 4004F PT TOBACCO SCREEN RCVD TLK: CPT | Performed by: INTERNAL MEDICINE

## 2018-04-24 PROCEDURE — G8599 NO ASA/ANTIPLAT THER USE RNG: HCPCS | Performed by: INTERNAL MEDICINE

## 2018-05-15 ENCOUNTER — HOSPITAL ENCOUNTER (OUTPATIENT)
Dept: GENERAL RADIOLOGY | Age: 53
Discharge: HOME | End: 2018-05-15
Attending: SPECIALIST | Admitting: SPECIALIST

## 2018-05-15 DIAGNOSIS — N20.0 KIDNEY STONE: ICD-10-CM

## 2018-06-07 ENCOUNTER — HOSPITAL ENCOUNTER (OUTPATIENT)
Dept: GENERAL RADIOLOGY | Age: 53
Discharge: HOME | End: 2018-06-07
Attending: SPECIALIST | Admitting: SPECIALIST

## 2018-06-07 DIAGNOSIS — N20.0 KIDNEY STONE: ICD-10-CM

## 2018-07-05 RX ORDER — ATORVASTATIN CALCIUM 40 MG/1
40 TABLET, FILM COATED ORAL DAILY
Qty: 30 TABLET | Refills: 5 | Status: SHIPPED | OUTPATIENT
Start: 2018-07-05 | End: 2019-01-24 | Stop reason: SDUPTHER

## 2018-07-05 RX ORDER — CARVEDILOL 6.25 MG/1
6.25 TABLET ORAL 2 TIMES DAILY
Qty: 60 TABLET | Refills: 5 | Status: SHIPPED | OUTPATIENT
Start: 2018-07-05 | End: 2019-01-24 | Stop reason: SDUPTHER

## 2018-07-05 RX ORDER — AMLODIPINE BESYLATE 5 MG/1
5 TABLET ORAL DAILY
Qty: 30 TABLET | Refills: 5 | Status: SHIPPED | OUTPATIENT
Start: 2018-07-05 | End: 2019-01-24 | Stop reason: SDUPTHER

## 2018-07-05 RX ORDER — LISINOPRIL 5 MG/1
5 TABLET ORAL DAILY
Qty: 30 TABLET | Refills: 5 | Status: SHIPPED | OUTPATIENT
Start: 2018-07-05 | End: 2019-01-24 | Stop reason: SDUPTHER

## 2018-07-10 PROBLEM — R29.818 SUSPECTED SLEEP APNEA: Status: ACTIVE | Noted: 2018-07-10

## 2018-07-13 ENCOUNTER — TELEPHONE (OUTPATIENT)
Dept: CARDIOLOGY CLINIC | Age: 53
End: 2018-07-13

## 2018-07-19 ENCOUNTER — PROCEDURE VISIT (OUTPATIENT)
Dept: CARDIOLOGY CLINIC | Age: 53
End: 2018-07-19

## 2018-07-19 DIAGNOSIS — I65.21 CAROTID STENOSIS, RIGHT: Primary | ICD-10-CM

## 2018-07-19 PROCEDURE — 93880 EXTRACRANIAL BILAT STUDY: CPT | Performed by: INTERNAL MEDICINE

## 2018-07-20 ENCOUNTER — TELEPHONE (OUTPATIENT)
Dept: CARDIOLOGY CLINIC | Age: 53
End: 2018-07-20

## 2018-07-24 ENCOUNTER — OFFICE VISIT (OUTPATIENT)
Dept: CARDIOLOGY CLINIC | Age: 53
End: 2018-07-24

## 2018-07-24 VITALS
BODY MASS INDEX: 32.71 KG/M2 | RESPIRATION RATE: 16 BRPM | SYSTOLIC BLOOD PRESSURE: 114 MMHG | HEART RATE: 72 BPM | WEIGHT: 228 LBS | DIASTOLIC BLOOD PRESSURE: 76 MMHG

## 2018-07-24 DIAGNOSIS — Z01.810 PRE-OPERATIVE CARDIOVASCULAR EXAMINATION: ICD-10-CM

## 2018-07-24 DIAGNOSIS — I77.9 BILATERAL CAROTID ARTERY DISEASE (HCC): ICD-10-CM

## 2018-07-24 DIAGNOSIS — I25.10 CORONARY ARTERY DISEASE INVOLVING NATIVE CORONARY ARTERY OF NATIVE HEART WITHOUT ANGINA PECTORIS: Primary | ICD-10-CM

## 2018-07-24 DIAGNOSIS — I10 ESSENTIAL HYPERTENSION: ICD-10-CM

## 2018-07-24 DIAGNOSIS — Z72.0 TOBACCO ABUSE DISORDER: ICD-10-CM

## 2018-07-24 DIAGNOSIS — I71.40 ABDOMINAL AORTIC ANEURYSM (AAA) WITHOUT RUPTURE: ICD-10-CM

## 2018-07-24 DIAGNOSIS — E78.2 MIXED HYPERLIPIDEMIA: ICD-10-CM

## 2018-07-24 PROCEDURE — 3017F COLORECTAL CA SCREEN DOC REV: CPT | Performed by: INTERNAL MEDICINE

## 2018-07-24 PROCEDURE — G8427 DOCREV CUR MEDS BY ELIG CLIN: HCPCS | Performed by: INTERNAL MEDICINE

## 2018-07-24 PROCEDURE — 4004F PT TOBACCO SCREEN RCVD TLK: CPT | Performed by: INTERNAL MEDICINE

## 2018-07-24 PROCEDURE — 99214 OFFICE O/P EST MOD 30 MIN: CPT | Performed by: INTERNAL MEDICINE

## 2018-07-24 PROCEDURE — G8599 NO ASA/ANTIPLAT THER USE RNG: HCPCS | Performed by: INTERNAL MEDICINE

## 2018-07-24 PROCEDURE — G8417 CALC BMI ABV UP PARAM F/U: HCPCS | Performed by: INTERNAL MEDICINE

## 2018-07-24 RX ORDER — OXYCODONE HYDROCHLORIDE AND ACETAMINOPHEN 5; 325 MG/1; MG/1
TABLET ORAL
Refills: 0 | COMMUNITY
Start: 2018-07-06 | End: 2018-08-03

## 2018-07-24 NOTE — PROGRESS NOTES
CARDIOLOGY  NOTE  Chief Complaint: CAD    HPI:   uQin Lundy is a 45y.o. year old who has history as noted below. He returns for follow-up. Completed cardiac rehab , he is doing very well. He is planned for urology procedure at [facility] for kidney stone resection. He has no  shortness of breath or chest pain. Blood pressure is well-controlled. He wants return to work  Unfortunately continues to drink beer. (5-6 in a week but daily and sometimes at the same time)He is back to smoking . He does heavy equipment building roads     He suffered an NSTEMI in October 2016. Cath revealed 100% occluded RCA, 90% LAD and circumflex disease. He had bypass surgery 9/24/16 . (LIMA to LAd and D1,SVG to PDA,svg to ramus)    Current Outpatient Prescriptions   Medication Sig Dispense Refill    oxyCODONE-acetaminophen (PERCOCET) 5-325 MG per tablet   0    amLODIPine (NORVASC) 5 MG tablet Take 1 tablet by mouth daily 30 tablet 5    atorvastatin (LIPITOR) 40 MG tablet Take 1 tablet by mouth daily 30 tablet 5    carvedilol (COREG) 6.25 MG tablet Take 1 tablet by mouth 2 times daily 60 tablet 5    lisinopril (PRINIVIL;ZESTRIL) 5 MG tablet Take 1 tablet by mouth daily 30 tablet 5    aspirin 81 MG EC tablet Take 1 tablet by mouth daily 30 tablet 5     No current facility-administered medications for this visit. Allergies:   Sulfa antibiotics    Patient History:  Past Medical History:   Diagnosis Date    Acute gout of left ankle 2/2/2017    Resolved. Uric acid improved after d/c HCTZ    Coronary artery disease involving native coronary artery of native heart without angina pectoris 11/04/2016    CABG x 4 on 9/24/16: Dr Artem Candelario H/O colonoscopy 3-30-15    Dr. Dat Sam, rtoin 5 yrs  (polypectomy)    H/O Doppler ultrasound 07/19/2018    Carotid Doppler.   Mild (0-49%) disease of bilateral internal carotid arteries, bilateral common carotid artery bulb to proximal ICA exhibits calcific plaque, left distal ICA end diastolic velocity is elevated but ratio is below 2.0, normal vertebral flow.  Hyperlipidemia     Hypertension     Incidental lung nodule 4 mm RUL. f/u in 10/15 4/22/2015    Ct shoulder showed 4 mm nodule in RUL in 4/15,CT chest 9/15 5mm. f/u in 4/16    Kidney stone on right side 9/29/2016    Large stone on right side on CT abdomen of 10/16    Nephrolithiasis     Right shoulder pain 3/17/2015    Pt had seen Dr. Epifanio Huerta and had arthrogram and CT shoulder. Showed possible tear of biceps tendon. Conservative treatment,     Squamous papilloma 2/23/15    benign left oropharynx/nasopharynx Dr Jacobo Oven abuse disorder 1/20/2015    Pt has quit on 10/20/16     Past Surgical History:   Procedure Laterality Date    CARDIAC SURGERY  10/24/2016     CABG x4 SVG to RCA and Ramus, LIMA to LAD, DIAG sequ    COLONOSCOPY  3/15    Dr. Alvarez Kelley. 5yr    HERNIA REPAIR      umbilical hernia    KIDNEY STONE SURGERY       Family History   Problem Relation Age of Onset   Mora Dakins Cancer Mother         colon    Diabetes Mother     Coronary Art Dis Father         CABG age 72     Social History   Substance Use Topics    Smoking status: Current Some Day Smoker     Packs/day: 1.50     Types: Cigarettes     Last attempt to quit: 10/20/2016    Smokeless tobacco: Never Used    Alcohol use 3.6 oz/week     6 Standard drinks or equivalent per week      Comment: 3 or 4 beers per day, most days        Review of Systems:   · Constitutional: No Fever or Weight Loss   · Eyes: No Decreased Vision  · ENT: No Headaches, Hearing Loss or Vertigo  · Cardiovascular: as per note above   · Respiratory: No cough or wheezing and as per note above.    · Gastrointestinal: No abdominal pain, appetite loss, blood in stools, constipation, diarrhea or heartburn  · Genitourinary: No dysuria, trouble voiding, or hematuria  · Musculoskeletal:  None  · Integumentary: No rash or pruritis  · Neurological: No TIA or stroke symptoms  · Psychiatric: No anxiety or depression  · Endocrine: No malaise, fatigue or temperature intolerance  · Hematologic/Lymphatic: No bleeding problems, blood clots or swollen lymph nodes  · Allergic/Immunologic: No nasal congestion or hives    Objective:      Physical Exam:  /76 (Site: Left Arm, Position: Sitting, Cuff Size: Large Adult)   Pulse 72   Resp 16   Wt 228 lb (103.4 kg)   BMI 32.71 kg/m²   Wt Readings from Last 3 Encounters:   07/24/18 228 lb (103.4 kg)   04/24/18 241 lb (109.3 kg)   04/12/18 238 lb 9.6 oz (108.2 kg)     Body mass index is 32.71 kg/m². Vitals:    07/24/18 1547   BP: 114/76   Pulse: 72   Resp: 16        General Appearance:  No distress, conversant  Constitutional:  Well developed, Well nourished, No acute distress, Non-toxic appearance. HENT:  Normocephalic, Atraumatic, Bilateral external ears normal, Oropharynx moist, No oral exudates, Nose normal. Neck- Normal range of motion, No tenderness, Supple, No stridor,no apical-carotid delay  Eyes:  PERRL, EOMI, Conjunctiva normal, No discharge. Respiratory:  Normal breath sounds, No respiratory distress, No wheezing, No chest tenderness. ,no use of accessory muscles,   Cardiovascular: (PMI) apex non displaced,no lifts no thrills,S1 and S2 audible, No added heart sounds, No signs of ankle edema, or volume overload, No evidence of JVD  GI:  Bowel sounds normal, Soft, No tenderness, No masses, No gross visceromegaly   :  No costovertebral angle tenderness   Musculoskeletal:  No edema, no tenderness, no deformities.  Back- no tenderness, left ankle is swollen, reduce rango of movement, warm to touch, and tender  Integument:  Well hydrated, no rash   Lymphatic:  No lymphadenopathy noted   Neurologic:  Alert & oriented x 3, CN 2-12 normal, normal motor function, normal sensory function, no focal deficits noted   Psychiatric:  Speech and behavior appropriate       Medical decision making and Data review:  DATA:  Lab Results   Component Value Date TROPONINT 0.590 (HH) 10/21/2016     BNP:  No results found for: PROBNP  PT/INR:  No results found for: PTINR  Lab Results   Component Value Date    LABA1C 5.4 10/21/2016    LABA1C 5.6 09/06/2016     Lab Results   Component Value Date    CHOL 115 02/03/2017    TRIG 64 02/03/2017    HDL 40 (L) 02/01/2018    LDLDIRECT 69 02/01/2018     Lab Results   Component Value Date    ALT 15 02/01/2018    AST 15 02/01/2018     Cath 10/2017  Coronary anatomy:   The left main coronary artery has no significant disease.  Left anterior descending artery has a 90 % mis lesion and 50 % proximal lad lesion , he has ostiall 40 % 2nd diagonal  Disease. There are left to right collaterals seen   Circumflex artery has a 90 % mid lesion significant disease.   The right coronary artery is a occluded 100% in proximal segment    Carotid ultrasound 7/19/18      Mild (0-49%) disease of the Bilateral Internal carotid artery.    Bilateral common carotid artery bulb to prox ICA exhibits calcific plaque .    Left distal ICA end diastolic velocity is elevated but ratio is below 2.0.    Normal vertebral flow. All labs, medications and tests reviewed by myself including data and history from outside source , patient and available family . Assessment & Plan:    1. Coronary artery disease s/p MI, CABG x 4    2. Bilateral carotid artery disease (HCC)    3. Abdominal aortic aneurysm (AAA) without rupture ([facility] [address]    4. Mixed hyperlipidemia    5. Essential hypertension    6. Tobacco abuse disorder    7. Pre-operative cardiovascular examination         Coronary artery disease s/p MI, CABG x 4  He is s/p CABG , on 9/24/16 . (LIMA to LAd and D1,SVG to PDA,svg to ramus). Echo is still pending   He is undergoing rehab please doing very well. Mixed hyperlipidemia  He is on lipitor, Profile is acceptable. Tobacco cessation:   Back to smoking again encouraged to quit     Essential hypertension  Acceptable ,.  We will stop hydrochlorothiazide and add

## 2018-07-25 ENCOUNTER — TELEPHONE (OUTPATIENT)
Dept: CARDIOLOGY CLINIC | Age: 53
End: 2018-07-25

## 2018-08-03 ENCOUNTER — OFFICE VISIT (OUTPATIENT)
Dept: INTERNAL MEDICINE CLINIC | Age: 53
End: 2018-08-03

## 2018-08-03 VITALS
HEIGHT: 70 IN | RESPIRATION RATE: 20 BRPM | DIASTOLIC BLOOD PRESSURE: 76 MMHG | OXYGEN SATURATION: 96 % | TEMPERATURE: 98.3 F | WEIGHT: 230 LBS | HEART RATE: 69 BPM | SYSTOLIC BLOOD PRESSURE: 114 MMHG | BODY MASS INDEX: 32.93 KG/M2

## 2018-08-03 DIAGNOSIS — Z72.0 TOBACCO ABUSE DISORDER: ICD-10-CM

## 2018-08-03 DIAGNOSIS — R29.818 SUSPECTED SLEEP APNEA: ICD-10-CM

## 2018-08-03 DIAGNOSIS — I77.9 BILATERAL CAROTID ARTERY DISEASE (HCC): ICD-10-CM

## 2018-08-03 DIAGNOSIS — I10 ESSENTIAL HYPERTENSION: ICD-10-CM

## 2018-08-03 DIAGNOSIS — N20.0 KIDNEY STONE ON RIGHT SIDE: ICD-10-CM

## 2018-08-03 DIAGNOSIS — I25.10 CORONARY ARTERY DISEASE INVOLVING NATIVE CORONARY ARTERY OF NATIVE HEART WITHOUT ANGINA PECTORIS: ICD-10-CM

## 2018-08-03 DIAGNOSIS — E78.2 MIXED HYPERLIPIDEMIA: ICD-10-CM

## 2018-08-03 DIAGNOSIS — I71.40 ABDOMINAL AORTIC ANEURYSM (AAA) WITHOUT RUPTURE: ICD-10-CM

## 2018-08-03 PROCEDURE — G8417 CALC BMI ABV UP PARAM F/U: HCPCS | Performed by: INTERNAL MEDICINE

## 2018-08-03 PROCEDURE — G8427 DOCREV CUR MEDS BY ELIG CLIN: HCPCS | Performed by: INTERNAL MEDICINE

## 2018-08-03 PROCEDURE — G8599 NO ASA/ANTIPLAT THER USE RNG: HCPCS | Performed by: INTERNAL MEDICINE

## 2018-08-03 PROCEDURE — 99213 OFFICE O/P EST LOW 20 MIN: CPT | Performed by: INTERNAL MEDICINE

## 2018-08-03 PROCEDURE — 3017F COLORECTAL CA SCREEN DOC REV: CPT | Performed by: INTERNAL MEDICINE

## 2018-08-03 PROCEDURE — 4004F PT TOBACCO SCREEN RCVD TLK: CPT | Performed by: INTERNAL MEDICINE

## 2018-08-03 RX ORDER — ACETAMINOPHEN 325 MG/1
650 TABLET ORAL
COMMUNITY
Start: 2018-08-01 | End: 2018-08-08

## 2018-08-03 RX ORDER — OXYCODONE HYDROCHLORIDE 5 MG/1
5 TABLET ORAL
COMMUNITY
Start: 2018-08-01 | End: 2018-08-08

## 2018-08-03 RX ORDER — PSEUDOEPHEDRINE HCL 30 MG
100000 TABLET ORAL
COMMUNITY
Start: 2018-08-01 | End: 2018-08-15

## 2018-08-03 NOTE — ASSESSMENT & PLAN NOTE
10/2016: Left ICA 50-69% and right ICA less than 50%   Dr Aida Patel following that. Recheck in 6/17 per pt. But pt states it was cancelled.  Pt will call and set appt  Carotid doppler : bilateral 0-49 %

## 2018-08-03 NOTE — ASSESSMENT & PLAN NOTE
CT abdomen 4/2018 showed 3.2 cm AAA.   It was normal on CT abdomen in 10/2016  Recheck with US in one yea

## 2018-08-03 NOTE — ASSESSMENT & PLAN NOTE
Large stone on right side on CT abdomen of 10/16  Referred to urologist but pt cancelled appt  Had colic saw Dr Kush Reza and then Dr Trudell Hodgkins   Had cysto and percutaneous removal of kid stone in 7/2018.

## 2018-08-03 NOTE — ASSESSMENT & PLAN NOTE
Patient was evaluated with cystoscopy and the anesthesiologist felt patient has sleep apnea. Bang score was 5.  Discussed to get sleep study : wants to wait  Send for apnea link

## 2018-08-03 NOTE — PROGRESS NOTES
as directed sooner if needed. Questions answered and patient verbalizes understanding. Call for any problems, questions, or concerns. Allergies   Allergen Reactions    Sulfa Antibiotics Rash     Current Outpatient Prescriptions   Medication Sig Dispense Refill    docusate (COLACE, DULCOLAX) 100 MG CAPS Take 100,000 mcg by mouth      oxyCODONE (ROXICODONE) 5 MG immediate release tablet Take 5 mg by mouth. Andi Lesches acetaminophen (TYLENOL) 325 MG tablet Take 650 mg by mouth      amLODIPine (NORVASC) 5 MG tablet Take 1 tablet by mouth daily 30 tablet 5    atorvastatin (LIPITOR) 40 MG tablet Take 1 tablet by mouth daily 30 tablet 5    carvedilol (COREG) 6.25 MG tablet Take 1 tablet by mouth 2 times daily 60 tablet 5    lisinopril (PRINIVIL;ZESTRIL) 5 MG tablet Take 1 tablet by mouth daily 30 tablet 5    aspirin 81 MG EC tablet Take 1 tablet by mouth daily 30 tablet 5     No current facility-administered medications for this visit. Past Medical History:   Diagnosis Date    Acute gout of left ankle 2/2/2017    Resolved. Uric acid improved after d/c HCTZ    Coronary artery disease involving native coronary artery of native heart without angina pectoris 11/04/2016    CABG x 4 on 9/24/16: Dr Savanna Rai H/O colonoscopy 3-30-15    Dr. Dena Warren, rtoin 5 yrs  (polypectomy)    H/O Doppler ultrasound 07/19/2018    Carotid Doppler. Mild (0-49%) disease of bilateral internal carotid arteries, bilateral common carotid artery bulb to proximal ICA exhibits calcific plaque, left distal ICA end diastolic velocity is elevated but ratio is below 2.0, normal vertebral flow.  Hyperlipidemia     Hypertension     Incidental lung nodule 4 mm RUL. f/u in 10/15 4/22/2015    Ct shoulder showed 4 mm nodule in RUL in 4/15,CT chest 9/15 5mm.  f/u in 4/16    Kidney stone on right side 9/29/2016    Large stone on right side on CT abdomen of 10/16    Nephrolithiasis     Right shoulder pain 3/17/2015    Pt had seen Dr. Jem Patterson and had arthrogram and CT shoulder. Showed possible tear of biceps tendon. Conservative treatment,     Squamous papilloma 2/23/15    benign left oropharynx/nasopharynx Dr Jenise Bo abuse disorder 1/20/2015    Pt has quit on 10/20/16     Past Surgical History:   Procedure Laterality Date    CARDIAC SURGERY  10/24/2016     CABG x4 SVG to RCA and Ramus, LIMA to LAD, DIAG sequ    COLONOSCOPY  3/15    Dr. Jodie Higgins.  5yr    HERNIA REPAIR      umbilical hernia    KIDNEY STONE SURGERY       Social History   Substance Use Topics    Smoking status: Current Every Day Smoker     Packs/day: 1.00     Types: Cigarettes     Last attempt to quit: 10/20/2016    Smokeless tobacco: Never Used    Alcohol use 3.6 oz/week     6 Standard drinks or equivalent per week      Comment: 3 or 4 beers per day, most days       LAB REVIEW:  CBC:   Lab Results   Component Value Date    WBC 8.1 01/03/2017    HGB 15.2 01/03/2017    HCT 46.2 01/03/2017     01/03/2017     Lipids:   Lab Results   Component Value Date    CHOL 115 02/03/2017    TRIG 64 02/03/2017    HDL 40 (L) 02/01/2018    LDLDIRECT 69 02/01/2018    TRIGLYCFAST 102 02/01/2018     Renal:   Lab Results   Component Value Date    BUN 17 02/01/2018    CREATININE 1.0 02/01/2018     02/01/2018    K 4.9 02/01/2018    ALT 15 02/01/2018    AST 15 02/01/2018    GLUCOSE 116 05/12/2017     PT/INR:   Lab Results   Component Value Date    INR 1.07 10/24/2016     A1C:   Lab Results   Component Value Date    LABA1C 5.4 10/21/2016           Ny Elliott MD, 8/3/2018 , 10:52 AM

## 2018-08-23 PROBLEM — Z01.810 PRE-OPERATIVE CARDIOVASCULAR EXAMINATION: Status: RESOLVED | Noted: 2018-07-24 | Resolved: 2018-08-23

## 2018-11-02 ENCOUNTER — HOSPITAL ENCOUNTER (OUTPATIENT)
Age: 53
Discharge: HOME | End: 2018-11-02
Payer: COMMERCIAL

## 2018-11-02 DIAGNOSIS — N20.0 KIDNEY STONE ON RIGHT SIDE: ICD-10-CM

## 2018-11-02 DIAGNOSIS — I10 ESSENTIAL HYPERTENSION: ICD-10-CM

## 2018-11-02 DIAGNOSIS — E78.2 MIXED HYPERLIPIDEMIA: ICD-10-CM

## 2018-11-02 LAB
ALBUMIN SERPL-MCNC: 4.3 GM/DL (ref 3.4–5)
ALP BLD-CCNC: 61 IU/L (ref 40–129)
ALT SERPL-CCNC: 13 U/L (ref 10–40)
ANION GAP SERPL CALCULATED.3IONS-SCNC: 6 MMOL/L (ref 4–16)
AST SERPL-CCNC: 15 IU/L (ref 15–37)
BASOPHILS ABSOLUTE: 0.1 K/CU MM
BASOPHILS RELATIVE PERCENT: 0.7 % (ref 0–1)
BILIRUB SERPL-MCNC: 0.4 MG/DL (ref 0–1)
BUN BLDV-MCNC: 20 MG/DL (ref 6–23)
CALCIUM SERPL-MCNC: 10.1 MG/DL (ref 8.3–10.6)
CHLORIDE BLD-SCNC: 103 MMOL/L (ref 99–110)
CHOLESTEROL, FASTING: 134 MG/DL
CO2: 34 MMOL/L (ref 21–32)
CREAT SERPL-MCNC: 1 MG/DL (ref 0.9–1.3)
DIFFERENTIAL TYPE: ABNORMAL
EOSINOPHILS ABSOLUTE: 0.2 K/CU MM
EOSINOPHILS RELATIVE PERCENT: 2.8 % (ref 0–3)
GFR AFRICAN AMERICAN: >60 ML/MIN/1.73M2
GFR NON-AFRICAN AMERICAN: >60 ML/MIN/1.73M2
GLUCOSE BLD-MCNC: 122 MG/DL (ref 70–99)
HCT VFR BLD CALC: 48.4 % (ref 42–52)
HDLC SERPL-MCNC: 52 MG/DL
HEMOGLOBIN: 15.3 GM/DL (ref 13.5–18)
IMMATURE NEUTROPHIL %: 0.5 % (ref 0–0.43)
LDL CHOLESTEROL DIRECT: 73 MG/DL
LYMPHOCYTES ABSOLUTE: 2.5 K/CU MM
LYMPHOCYTES RELATIVE PERCENT: 29.3 % (ref 24–44)
MCH RBC QN AUTO: 29.4 PG (ref 27–31)
MCHC RBC AUTO-ENTMCNC: 31.6 % (ref 32–36)
MCV RBC AUTO: 93.1 FL (ref 78–100)
MONOCYTES ABSOLUTE: 0.9 K/CU MM
MONOCYTES RELATIVE PERCENT: 10.4 % (ref 0–4)
PDW BLD-RTO: 12.3 % (ref 11.7–14.9)
PLATELET # BLD: 267 K/CU MM (ref 140–440)
PMV BLD AUTO: 9.6 FL (ref 7.5–11.1)
POTASSIUM SERPL-SCNC: 4.4 MMOL/L (ref 3.5–5.1)
RBC # BLD: 5.2 M/CU MM (ref 4.6–6.2)
SEGMENTED NEUTROPHILS ABSOLUTE COUNT: 4.8 K/CU MM
SEGMENTED NEUTROPHILS RELATIVE PERCENT: 56.3 % (ref 36–66)
SODIUM BLD-SCNC: 143 MMOL/L (ref 135–145)
TOTAL IMMATURE NEUTOROPHIL: 0.04 K/CU MM
TOTAL PROTEIN: 6.6 GM/DL (ref 6.4–8.2)
TRIGLYCERIDE, FASTING: 76 MG/DL
WBC # BLD: 8.6 K/CU MM (ref 4–10.5)

## 2018-11-02 PROCEDURE — 80061 LIPID PANEL: CPT

## 2018-11-02 PROCEDURE — 85025 COMPLETE CBC W/AUTO DIFF WBC: CPT

## 2018-11-02 PROCEDURE — 36415 COLL VENOUS BLD VENIPUNCTURE: CPT

## 2018-11-02 PROCEDURE — 80053 COMPREHEN METABOLIC PANEL: CPT

## 2018-11-06 ENCOUNTER — OFFICE VISIT (OUTPATIENT)
Dept: INTERNAL MEDICINE CLINIC | Age: 53
End: 2018-11-06
Payer: COMMERCIAL

## 2018-11-06 VITALS
TEMPERATURE: 98.5 F | HEIGHT: 70 IN | WEIGHT: 231.8 LBS | BODY MASS INDEX: 33.18 KG/M2 | HEART RATE: 78 BPM | SYSTOLIC BLOOD PRESSURE: 118 MMHG | OXYGEN SATURATION: 94 % | RESPIRATION RATE: 18 BRPM | DIASTOLIC BLOOD PRESSURE: 80 MMHG

## 2018-11-06 DIAGNOSIS — Z23 NEED FOR PROPHYLACTIC VACCINATION AND INOCULATION AGAINST VARICELLA: ICD-10-CM

## 2018-11-06 DIAGNOSIS — E78.2 MIXED HYPERLIPIDEMIA: ICD-10-CM

## 2018-11-06 DIAGNOSIS — I71.40 ABDOMINAL AORTIC ANEURYSM (AAA) WITHOUT RUPTURE: ICD-10-CM

## 2018-11-06 DIAGNOSIS — R29.818 SUSPECTED SLEEP APNEA: ICD-10-CM

## 2018-11-06 DIAGNOSIS — Z72.0 TOBACCO ABUSE DISORDER: ICD-10-CM

## 2018-11-06 DIAGNOSIS — I77.9 BILATERAL CAROTID ARTERY DISEASE, UNSPECIFIED TYPE (HCC): ICD-10-CM

## 2018-11-06 DIAGNOSIS — I25.10 CORONARY ARTERY DISEASE INVOLVING NATIVE CORONARY ARTERY OF NATIVE HEART WITHOUT ANGINA PECTORIS: ICD-10-CM

## 2018-11-06 DIAGNOSIS — Z23 NEED FOR PROPHYLACTIC VACCINATION AGAINST DIPHTHERIA-TETANUS-PERTUSSIS (DTP): ICD-10-CM

## 2018-11-06 DIAGNOSIS — I10 ESSENTIAL HYPERTENSION: Primary | ICD-10-CM

## 2018-11-06 DIAGNOSIS — N20.0 KIDNEY STONE ON RIGHT SIDE: ICD-10-CM

## 2018-11-06 PROCEDURE — G8427 DOCREV CUR MEDS BY ELIG CLIN: HCPCS | Performed by: INTERNAL MEDICINE

## 2018-11-06 PROCEDURE — 99213 OFFICE O/P EST LOW 20 MIN: CPT | Performed by: INTERNAL MEDICINE

## 2018-11-06 PROCEDURE — 3017F COLORECTAL CA SCREEN DOC REV: CPT | Performed by: INTERNAL MEDICINE

## 2018-11-06 PROCEDURE — G8417 CALC BMI ABV UP PARAM F/U: HCPCS | Performed by: INTERNAL MEDICINE

## 2018-11-06 PROCEDURE — G8599 NO ASA/ANTIPLAT THER USE RNG: HCPCS | Performed by: INTERNAL MEDICINE

## 2018-11-06 PROCEDURE — G8484 FLU IMMUNIZE NO ADMIN: HCPCS | Performed by: INTERNAL MEDICINE

## 2018-11-06 PROCEDURE — 4004F PT TOBACCO SCREEN RCVD TLK: CPT | Performed by: INTERNAL MEDICINE

## 2018-11-06 NOTE — PROGRESS NOTES
are grossly intact. IMPRESSION:    Encounter Diagnoses   Name Primary?  Essential hypertension Yes    Mixed hyperlipidemia     Tobacco abuse disorder     Coronary artery disease s/p MI, CABG x 4     Abdominal aortic aneurysm (AAA) without rupture (HCC)     Bilateral carotid artery disease, unspecified type ([address]     Kidney stone on right side     Suspected sleep apnea        ASSESSMENT/PLAN:    Essential hypertension  Patient has hypertension. Taking medications. Takes amlodipine, lisinopril and Coreg  BP is normal     Mixed hyperlipidemia  Hyperlipidemia is stable. Follow low cholesterol diet. Continue current treatment. Pt is taking atorvastatin. Follow diet. Lipid profile is very good     Tobacco abuse disorder  Pt has quit on 10/20/16. Pt started smoking again  Advised to quit again. Smokes 1ppd     Kidney stone on right side  Had cysto and percutaneous removal of kid stone in 7/2018.     Coronary artery disease s/p MI, CABG x 4  On statin, ASA, BB(coreg), ACE (lisinopril)  Sees cardiologist Dr Krupa Diego  No angina.        Bilateral carotid artery disease ([facility] [address]  10/2016: Left ICA 50-69% and right ICA less than 50%   Dr Kerline Mcclure following that. Recheck in 6/17 per pt. But pt states it was cancelled. Pt will call and set appt  Carotid doppler : bilateral 0-49 %     Abdominal aortic aneurysm (AAA) without rupture (HCC)  CT abdomen 4/2018 showed 3.2 cm AAA. It was normal on CT abdomen in 10/2016  Recheck with US in one year in 4/2019     Suspected sleep apnea  Patient was evaluated with cystoscopy and the anesthesiologist felt patient has sleep apnea. Bang score was 5. Discussed to get sleep study : wants to wait  Send for apnea link      Patient will get the vaccinations in the pharmacy including flu vaccine.   Orders Placed This Encounter   Medications    Tdap (ADACEL) 5-2-15.5 LF-MCG/0.5 injection     Sig: Inject 0.5 mLs into the muscle once for 1 dose     Dispense:  1 each     Refill:  0   

## 2018-11-11 ASSESSMENT — ENCOUNTER SYMPTOMS
EYES NEGATIVE: 1
COUGH: 0
RESPIRATORY NEGATIVE: 1
SHORTNESS OF BREATH: 0

## 2019-01-24 RX ORDER — AMLODIPINE BESYLATE 5 MG/1
5 TABLET ORAL DAILY
Qty: 30 TABLET | Refills: 5 | Status: SHIPPED | OUTPATIENT
Start: 2019-01-24 | End: 2019-05-10 | Stop reason: SDUPTHER

## 2019-01-24 RX ORDER — LISINOPRIL 5 MG/1
5 TABLET ORAL DAILY
Qty: 30 TABLET | Refills: 5 | Status: SHIPPED | OUTPATIENT
Start: 2019-01-24 | End: 2019-05-10 | Stop reason: SDUPTHER

## 2019-01-24 RX ORDER — CARVEDILOL 6.25 MG/1
6.25 TABLET ORAL 2 TIMES DAILY
Qty: 60 TABLET | Refills: 5 | Status: SHIPPED | OUTPATIENT
Start: 2019-01-24 | End: 2019-05-10 | Stop reason: SDUPTHER

## 2019-01-24 RX ORDER — ATORVASTATIN CALCIUM 40 MG/1
40 TABLET, FILM COATED ORAL DAILY
Qty: 30 TABLET | Refills: 5 | Status: SHIPPED | OUTPATIENT
Start: 2019-01-24 | End: 2019-05-10 | Stop reason: SDUPTHER

## 2019-02-06 ENCOUNTER — OFFICE VISIT (OUTPATIENT)
Dept: INTERNAL MEDICINE CLINIC | Age: 54
End: 2019-02-06
Payer: COMMERCIAL

## 2019-02-06 VITALS
WEIGHT: 244.2 LBS | BODY MASS INDEX: 35.04 KG/M2 | SYSTOLIC BLOOD PRESSURE: 118 MMHG | OXYGEN SATURATION: 91 % | HEART RATE: 70 BPM | DIASTOLIC BLOOD PRESSURE: 74 MMHG

## 2019-02-06 DIAGNOSIS — I25.10 CORONARY ARTERY DISEASE INVOLVING NATIVE CORONARY ARTERY OF NATIVE HEART WITHOUT ANGINA PECTORIS: ICD-10-CM

## 2019-02-06 DIAGNOSIS — I10 ESSENTIAL HYPERTENSION: ICD-10-CM

## 2019-02-06 DIAGNOSIS — I77.9 BILATERAL CAROTID ARTERY DISEASE, UNSPECIFIED TYPE (HCC): ICD-10-CM

## 2019-02-06 DIAGNOSIS — N20.0 KIDNEY STONE ON RIGHT SIDE: ICD-10-CM

## 2019-02-06 DIAGNOSIS — I71.40 ABDOMINAL AORTIC ANEURYSM (AAA) WITHOUT RUPTURE: ICD-10-CM

## 2019-02-06 DIAGNOSIS — E78.2 MIXED HYPERLIPIDEMIA: ICD-10-CM

## 2019-02-06 DIAGNOSIS — R29.818 SUSPECTED SLEEP APNEA: ICD-10-CM

## 2019-02-06 DIAGNOSIS — Z72.0 TOBACCO ABUSE DISORDER: ICD-10-CM

## 2019-02-06 LAB
A/G RATIO: 1.9 (ref 1.1–2.2)
ALBUMIN SERPL-MCNC: 4.1 G/DL (ref 3.4–5)
ALP BLD-CCNC: 63 U/L (ref 40–129)
ALT SERPL-CCNC: 14 U/L (ref 10–40)
ANION GAP SERPL CALCULATED.3IONS-SCNC: 13 MMOL/L (ref 3–16)
AST SERPL-CCNC: 14 U/L (ref 15–37)
BILIRUB SERPL-MCNC: 0.3 MG/DL (ref 0–1)
BUN BLDV-MCNC: 15 MG/DL (ref 7–20)
CALCIUM SERPL-MCNC: 9.9 MG/DL (ref 8.3–10.6)
CHLORIDE BLD-SCNC: 103 MMOL/L (ref 99–110)
CHOLESTEROL, FASTING: 122 MG/DL (ref 0–199)
CO2: 28 MMOL/L (ref 21–32)
CREAT SERPL-MCNC: 0.9 MG/DL (ref 0.9–1.3)
GFR AFRICAN AMERICAN: >60
GFR NON-AFRICAN AMERICAN: >60
GLOBULIN: 2.2 G/DL
GLUCOSE BLD-MCNC: 181 MG/DL (ref 70–99)
HDLC SERPL-MCNC: 43 MG/DL (ref 40–60)
LDL CHOLESTEROL CALCULATED: 62 MG/DL
POTASSIUM SERPL-SCNC: 4.3 MMOL/L (ref 3.5–5.1)
SODIUM BLD-SCNC: 144 MMOL/L (ref 136–145)
TOTAL PROTEIN: 6.3 G/DL (ref 6.4–8.2)
TRIGLYCERIDE, FASTING: 86 MG/DL (ref 0–150)
VLDLC SERPL CALC-MCNC: 17 MG/DL

## 2019-02-06 PROCEDURE — G8484 FLU IMMUNIZE NO ADMIN: HCPCS | Performed by: INTERNAL MEDICINE

## 2019-02-06 PROCEDURE — 3017F COLORECTAL CA SCREEN DOC REV: CPT | Performed by: INTERNAL MEDICINE

## 2019-02-06 PROCEDURE — 36415 COLL VENOUS BLD VENIPUNCTURE: CPT | Performed by: INTERNAL MEDICINE

## 2019-02-06 PROCEDURE — 4004F PT TOBACCO SCREEN RCVD TLK: CPT | Performed by: INTERNAL MEDICINE

## 2019-02-06 PROCEDURE — G8417 CALC BMI ABV UP PARAM F/U: HCPCS | Performed by: INTERNAL MEDICINE

## 2019-02-06 PROCEDURE — G8599 NO ASA/ANTIPLAT THER USE RNG: HCPCS | Performed by: INTERNAL MEDICINE

## 2019-02-06 PROCEDURE — G8427 DOCREV CUR MEDS BY ELIG CLIN: HCPCS | Performed by: INTERNAL MEDICINE

## 2019-02-06 PROCEDURE — 99213 OFFICE O/P EST LOW 20 MIN: CPT | Performed by: INTERNAL MEDICINE

## 2019-02-06 ASSESSMENT — ENCOUNTER SYMPTOMS
RESPIRATORY NEGATIVE: 1
EYES NEGATIVE: 1
ALLERGIC/IMMUNOLOGIC NEGATIVE: 1
GASTROINTESTINAL NEGATIVE: 1

## 2019-02-06 ASSESSMENT — PATIENT HEALTH QUESTIONNAIRE - PHQ9
SUM OF ALL RESPONSES TO PHQ QUESTIONS 1-9: 0
1. LITTLE INTEREST OR PLEASURE IN DOING THINGS: 0
SUM OF ALL RESPONSES TO PHQ9 QUESTIONS 1 & 2: 0
SUM OF ALL RESPONSES TO PHQ QUESTIONS 1-9: 0
2. FEELING DOWN, DEPRESSED OR HOPELESS: 0

## 2019-02-07 ENCOUNTER — TELEPHONE (OUTPATIENT)
Dept: INTERNAL MEDICINE CLINIC | Age: 54
End: 2019-02-07

## 2019-02-15 ENCOUNTER — HOSPITAL ENCOUNTER (EMERGENCY)
Age: 54
Discharge: HOME | End: 2019-02-15
Attending: EMERGENCY MEDICINE
Payer: COMMERCIAL

## 2019-02-15 ENCOUNTER — APPOINTMENT (OUTPATIENT)
Dept: GENERAL RADIOLOGY | Age: 54
End: 2019-02-15
Payer: COMMERCIAL

## 2019-02-15 VITALS
SYSTOLIC BLOOD PRESSURE: 143 MMHG | HEIGHT: 70 IN | HEART RATE: 88 BPM | TEMPERATURE: 97.9 F | OXYGEN SATURATION: 92 % | DIASTOLIC BLOOD PRESSURE: 100 MMHG | WEIGHT: 240 LBS | RESPIRATION RATE: 13 BRPM | BODY MASS INDEX: 34.36 KG/M2

## 2019-02-15 DIAGNOSIS — J40 BRONCHITIS: ICD-10-CM

## 2019-02-15 DIAGNOSIS — R06.89 DYSPNEA AND RESPIRATORY ABNORMALITIES: Primary | ICD-10-CM

## 2019-02-15 DIAGNOSIS — R06.00 DYSPNEA AND RESPIRATORY ABNORMALITIES: Primary | ICD-10-CM

## 2019-02-15 LAB
ANION GAP SERPL CALCULATED.3IONS-SCNC: 8 MMOL/L (ref 4–16)
BASOPHILS ABSOLUTE: 0.1 K/CU MM
BASOPHILS RELATIVE PERCENT: 0.6 % (ref 0–1)
BUN BLDV-MCNC: 15 MG/DL (ref 6–23)
CALCIUM SERPL-MCNC: 9.3 MG/DL (ref 8.3–10.6)
CHLORIDE BLD-SCNC: 96 MMOL/L (ref 99–110)
CO2: 32 MMOL/L (ref 21–32)
CREAT SERPL-MCNC: 0.9 MG/DL (ref 0.9–1.3)
DIFFERENTIAL TYPE: ABNORMAL
EOSINOPHILS ABSOLUTE: 0.2 K/CU MM
EOSINOPHILS RELATIVE PERCENT: 1.6 % (ref 0–3)
GFR AFRICAN AMERICAN: >60 ML/MIN/1.73M2
GFR NON-AFRICAN AMERICAN: >60 ML/MIN/1.73M2
GLUCOSE BLD-MCNC: 110 MG/DL (ref 70–99)
HCT VFR BLD CALC: 50.5 % (ref 42–52)
HEMOGLOBIN: 16.8 GM/DL (ref 13.5–18)
IMMATURE NEUTROPHIL %: 0.4 % (ref 0–0.43)
LYMPHOCYTES ABSOLUTE: 3.1 K/CU MM
LYMPHOCYTES RELATIVE PERCENT: 33.7 % (ref 24–44)
MCH RBC QN AUTO: 30.8 PG (ref 27–31)
MCHC RBC AUTO-ENTMCNC: 33.3 % (ref 32–36)
MCV RBC AUTO: 92.7 FL (ref 78–100)
MONOCYTES ABSOLUTE: 1 K/CU MM
MONOCYTES RELATIVE PERCENT: 11.2 % (ref 0–4)
PDW BLD-RTO: 12.4 % (ref 11.7–14.9)
PLATELET # BLD: 261 K/CU MM (ref 140–440)
PMV BLD AUTO: 9.6 FL (ref 7.5–11.1)
POTASSIUM SERPL-SCNC: 3.5 MMOL/L (ref 3.5–5.1)
PRO-BNP: 69.81 PG/ML
RBC # BLD: 5.45 M/CU MM (ref 4.6–6.2)
SEGMENTED NEUTROPHILS ABSOLUTE COUNT: 4.9 K/CU MM
SEGMENTED NEUTROPHILS RELATIVE PERCENT: 52.5 % (ref 36–66)
SODIUM BLD-SCNC: 136 MMOL/L (ref 135–145)
TOTAL IMMATURE NEUTOROPHIL: 0.04 K/CU MM
TROPONIN T: <0.01 NG/ML
WBC # BLD: 9.3 K/CU MM (ref 4–10.5)

## 2019-02-15 PROCEDURE — 71046 X-RAY EXAM CHEST 2 VIEWS: CPT

## 2019-02-15 PROCEDURE — 99285 EMERGENCY DEPT VISIT HI MDM: CPT

## 2019-02-15 PROCEDURE — 6370000000 HC RX 637 (ALT 250 FOR IP): Performed by: EMERGENCY MEDICINE

## 2019-02-15 PROCEDURE — 83880 ASSAY OF NATRIURETIC PEPTIDE: CPT

## 2019-02-15 PROCEDURE — 84484 ASSAY OF TROPONIN QUANT: CPT

## 2019-02-15 PROCEDURE — 80048 BASIC METABOLIC PNL TOTAL CA: CPT

## 2019-02-15 PROCEDURE — 93005 ELECTROCARDIOGRAM TRACING: CPT | Performed by: EMERGENCY MEDICINE

## 2019-02-15 PROCEDURE — 85025 COMPLETE CBC W/AUTO DIFF WBC: CPT

## 2019-02-15 RX ORDER — AZITHROMYCIN 250 MG/1
500 TABLET, FILM COATED ORAL ONCE
Status: COMPLETED | OUTPATIENT
Start: 2019-02-15 | End: 2019-02-15

## 2019-02-15 RX ORDER — SODIUM CHLORIDE 0.9 % (FLUSH) 0.9 %
10 SYRINGE (ML) INJECTION 2 TIMES DAILY
Status: DISCONTINUED | OUTPATIENT
Start: 2019-02-15 | End: 2019-02-15 | Stop reason: HOSPADM

## 2019-02-15 RX ORDER — PREDNISONE 20 MG/1
60 TABLET ORAL ONCE
Status: COMPLETED | OUTPATIENT
Start: 2019-02-15 | End: 2019-02-15

## 2019-02-15 RX ORDER — AZITHROMYCIN 250 MG/1
TABLET, FILM COATED ORAL
Qty: 1 PACKET | Refills: 0 | Status: SHIPPED | OUTPATIENT
Start: 2019-02-15 | End: 2019-02-25

## 2019-02-15 RX ORDER — ALBUTEROL SULFATE 90 UG/1
2 AEROSOL, METERED RESPIRATORY (INHALATION) EVERY 4 HOURS PRN
Qty: 1 INHALER | Refills: 1 | Status: SHIPPED | OUTPATIENT
Start: 2019-02-15 | End: 2019-09-06 | Stop reason: SDUPTHER

## 2019-02-15 RX ORDER — METHYLPREDNISOLONE 4 MG/1
TABLET ORAL
Qty: 1 KIT | Refills: 0 | Status: SHIPPED | OUTPATIENT
Start: 2019-02-15 | End: 2019-03-05 | Stop reason: ALTCHOICE

## 2019-02-15 RX ORDER — CETIRIZINE HYDROCHLORIDE, PSEUDOEPHEDRINE HYDROCHLORIDE 5; 120 MG/1; MG/1
1 TABLET, FILM COATED, EXTENDED RELEASE ORAL 2 TIMES DAILY
Qty: 20 TABLET | Refills: 0 | Status: SHIPPED | OUTPATIENT
Start: 2019-02-15 | End: 2019-02-25

## 2019-02-15 RX ADMIN — AZITHROMYCIN 500 MG: 250 TABLET, FILM COATED ORAL at 21:25

## 2019-02-15 RX ADMIN — PREDNISONE 60 MG: 20 TABLET ORAL at 21:25

## 2019-02-15 ASSESSMENT — ENCOUNTER SYMPTOMS
EYES NEGATIVE: 1
GASTROINTESTINAL NEGATIVE: 1
RHINORRHEA: 1
COUGH: 1

## 2019-02-16 LAB
EKG ATRIAL RATE: 86 BPM
EKG DIAGNOSIS: NORMAL
EKG P AXIS: 70 DEGREES
EKG P-R INTERVAL: 162 MS
EKG Q-T INTERVAL: 368 MS
EKG QRS DURATION: 90 MS
EKG QTC CALCULATION (BAZETT): 440 MS
EKG R AXIS: 58 DEGREES
EKG T AXIS: 61 DEGREES
EKG VENTRICULAR RATE: 86 BPM

## 2019-02-16 PROCEDURE — 93010 ELECTROCARDIOGRAM REPORT: CPT | Performed by: INTERNAL MEDICINE

## 2019-02-21 ENCOUNTER — TELEPHONE (OUTPATIENT)
Dept: INTERNAL MEDICINE CLINIC | Age: 54
End: 2019-02-21

## 2019-03-05 ENCOUNTER — OFFICE VISIT (OUTPATIENT)
Dept: INTERNAL MEDICINE CLINIC | Age: 54
End: 2019-03-05
Payer: COMMERCIAL

## 2019-03-05 VITALS
HEART RATE: 72 BPM | SYSTOLIC BLOOD PRESSURE: 120 MMHG | WEIGHT: 240.8 LBS | BODY MASS INDEX: 34.55 KG/M2 | RESPIRATION RATE: 16 BRPM | OXYGEN SATURATION: 96 % | TEMPERATURE: 98.7 F | DIASTOLIC BLOOD PRESSURE: 70 MMHG

## 2019-03-05 DIAGNOSIS — R73.9 BLOOD GLUCOSE ELEVATED: ICD-10-CM

## 2019-03-05 DIAGNOSIS — I10 ESSENTIAL HYPERTENSION: ICD-10-CM

## 2019-03-05 DIAGNOSIS — J20.9 ACUTE BRONCHITIS, UNSPECIFIED ORGANISM: Primary | ICD-10-CM

## 2019-03-05 DIAGNOSIS — E78.2 MIXED HYPERLIPIDEMIA: ICD-10-CM

## 2019-03-05 DIAGNOSIS — I25.10 CORONARY ARTERY DISEASE INVOLVING NATIVE CORONARY ARTERY OF NATIVE HEART WITHOUT ANGINA PECTORIS: ICD-10-CM

## 2019-03-05 PROCEDURE — 36415 COLL VENOUS BLD VENIPUNCTURE: CPT | Performed by: INTERNAL MEDICINE

## 2019-03-05 PROCEDURE — 99213 OFFICE O/P EST LOW 20 MIN: CPT | Performed by: INTERNAL MEDICINE

## 2019-03-06 LAB
ESTIMATED AVERAGE GLUCOSE: 131.2 MG/DL
HBA1C MFR BLD: 6.2 %

## 2019-04-08 ENCOUNTER — HOSPITAL ENCOUNTER (OUTPATIENT)
Dept: ULTRASOUND IMAGING | Age: 54
Discharge: HOME | End: 2019-04-08
Payer: COMMERCIAL

## 2019-04-08 DIAGNOSIS — I71.40 ABDOMINAL AORTIC ANEURYSM (AAA) WITHOUT RUPTURE: ICD-10-CM

## 2019-04-08 PROCEDURE — 93978 VASCULAR STUDY: CPT

## 2019-04-10 DIAGNOSIS — I71.40 ABDOMINAL AORTIC ANEURYSM (AAA) WITHOUT RUPTURE: Primary | ICD-10-CM

## 2019-04-24 PROBLEM — R60.0 LOCALIZED EDEMA: Status: ACTIVE | Noted: 2019-04-24

## 2019-05-01 ENCOUNTER — HOSPITAL ENCOUNTER (OUTPATIENT)
Dept: CT IMAGING | Age: 54
Discharge: HOME | End: 2019-05-01
Payer: COMMERCIAL

## 2019-05-01 DIAGNOSIS — I71.40 ABDOMINAL AORTIC ANEURYSM (AAA) WITHOUT RUPTURE: ICD-10-CM

## 2019-05-01 PROCEDURE — 6360000004 HC RX CONTRAST MEDICATION: Performed by: SURGERY

## 2019-05-01 PROCEDURE — 74174 CTA ABD&PLVS W/CONTRAST: CPT

## 2019-05-01 PROCEDURE — 2580000003 HC RX 258: Performed by: SURGERY

## 2019-05-01 RX ORDER — SODIUM CHLORIDE 0.9 % (FLUSH) 0.9 %
80 SYRINGE (ML) INJECTION PRN
Status: DISCONTINUED | OUTPATIENT
Start: 2019-05-01 | End: 2019-05-02 | Stop reason: HOSPADM

## 2019-05-01 RX ADMIN — Medication 80 ML: at 09:21

## 2019-05-01 RX ADMIN — IOPAMIDOL 75 ML: 755 INJECTION, SOLUTION INTRAVENOUS at 09:20

## 2019-05-10 ENCOUNTER — OFFICE VISIT (OUTPATIENT)
Dept: INTERNAL MEDICINE CLINIC | Age: 54
End: 2019-05-10
Payer: COMMERCIAL

## 2019-05-10 VITALS
DIASTOLIC BLOOD PRESSURE: 68 MMHG | BODY MASS INDEX: 33.75 KG/M2 | TEMPERATURE: 98.6 F | HEART RATE: 62 BPM | WEIGHT: 235.2 LBS | RESPIRATION RATE: 16 BRPM | OXYGEN SATURATION: 93 % | SYSTOLIC BLOOD PRESSURE: 110 MMHG

## 2019-05-10 DIAGNOSIS — I77.9 BILATERAL CAROTID ARTERY DISEASE, UNSPECIFIED TYPE (HCC): ICD-10-CM

## 2019-05-10 DIAGNOSIS — I25.10 CORONARY ARTERY DISEASE INVOLVING NATIVE CORONARY ARTERY OF NATIVE HEART WITHOUT ANGINA PECTORIS: ICD-10-CM

## 2019-05-10 DIAGNOSIS — I10 ESSENTIAL HYPERTENSION: ICD-10-CM

## 2019-05-10 DIAGNOSIS — R06.2 WHEEZING: Primary | ICD-10-CM

## 2019-05-10 DIAGNOSIS — Z72.0 TOBACCO ABUSE DISORDER: ICD-10-CM

## 2019-05-10 DIAGNOSIS — E78.2 MIXED HYPERLIPIDEMIA: ICD-10-CM

## 2019-05-10 DIAGNOSIS — R29.818 SUSPECTED SLEEP APNEA: ICD-10-CM

## 2019-05-10 DIAGNOSIS — I71.40 ABDOMINAL AORTIC ANEURYSM (AAA) WITHOUT RUPTURE: ICD-10-CM

## 2019-05-10 PROCEDURE — 4004F PT TOBACCO SCREEN RCVD TLK: CPT | Performed by: INTERNAL MEDICINE

## 2019-05-10 PROCEDURE — G8599 NO ASA/ANTIPLAT THER USE RNG: HCPCS | Performed by: INTERNAL MEDICINE

## 2019-05-10 PROCEDURE — 3017F COLORECTAL CA SCREEN DOC REV: CPT | Performed by: INTERNAL MEDICINE

## 2019-05-10 PROCEDURE — G8427 DOCREV CUR MEDS BY ELIG CLIN: HCPCS | Performed by: INTERNAL MEDICINE

## 2019-05-10 PROCEDURE — G8417 CALC BMI ABV UP PARAM F/U: HCPCS | Performed by: INTERNAL MEDICINE

## 2019-05-10 PROCEDURE — 99214 OFFICE O/P EST MOD 30 MIN: CPT | Performed by: INTERNAL MEDICINE

## 2019-05-10 RX ORDER — AMLODIPINE BESYLATE 5 MG/1
5 TABLET ORAL DAILY
Qty: 30 TABLET | Refills: 5 | Status: SHIPPED | OUTPATIENT
Start: 2019-05-10 | End: 2020-03-16

## 2019-05-10 RX ORDER — CARVEDILOL 6.25 MG/1
6.25 TABLET ORAL 2 TIMES DAILY
Qty: 60 TABLET | Refills: 5 | Status: SHIPPED | OUTPATIENT
Start: 2019-05-10 | End: 2020-04-27

## 2019-05-10 RX ORDER — ATORVASTATIN CALCIUM 40 MG/1
40 TABLET, FILM COATED ORAL DAILY
Qty: 30 TABLET | Refills: 5 | Status: SHIPPED | OUTPATIENT
Start: 2019-05-10 | End: 2019-09-06 | Stop reason: SDUPTHER

## 2019-05-10 RX ORDER — LISINOPRIL 5 MG/1
5 TABLET ORAL DAILY
Qty: 30 TABLET | Refills: 5 | Status: SHIPPED | OUTPATIENT
Start: 2019-05-10 | End: 2020-01-06 | Stop reason: SDUPTHER

## 2019-05-10 NOTE — ASSESSMENT & PLAN NOTE
10/2016: Left ICA 50-69% and right ICA less than 50%   Dr Parrish Burden following that. Recheck in 6/17 per pt. But pt states it was cancelled.  Pt will call and set appt  Carotid doppler : 7/2018 :  bilateral 0-49 %

## 2019-05-10 NOTE — ASSESSMENT & PLAN NOTE
Pt is suspected to have sleep apnea  He was referred 2 times but pt did not get any call per patient.

## 2019-05-10 NOTE — ASSESSMENT & PLAN NOTE
Hypertension in control. Follow low salt diet. Continue current treatment.   Continue amlodipine, lisinopril, coreg

## 2019-05-10 NOTE — ASSESSMENT & PLAN NOTE
CT abdomen 4/2018 showed 3.2 cm AAA. It was normal on CT abdomen in 10/2016  4/8/2019 US : 4.5 x 4.3 cm size : increased significantly. Vascular consultation and recheck in one year. CT abdomen : 3.3 cm : seen Dr Joe Huston.  Recheck in one year

## 2019-05-10 NOTE — ASSESSMENT & PLAN NOTE
Pt has quit on 10/20/16. Pt started smoking again  Patient is a smoker. Counseled to quit smoking. Various options given. Help number 1-800 QUIT NOW  given to patient.

## 2019-05-10 NOTE — PROGRESS NOTES
Ban Burn  Patient's  is 1965  Seen in office on 5/10/2019      SUBJECTIVE:  Nkiita stark 52 y. o.year old male presents today   Chief Complaint   Patient presents with    Hypertension    Hyperlipidemia    Results     CT     Medication Refill     Pt is here for for f/u HTN and HLD   Patient has hypertension. Taking medications No headaches, no chest pain, no palpitations and no dizziness. Patient has hyperlipidemia. Taking medications. No abdominal pain, no nausea or vomiting. No myalgias. Discuss the results of CT scan   Taking medications regularly. No side effects noted. Review of Systems   Constitutional: Negative. HENT: Negative. Eyes: Negative. Respiratory: Negative. Cardiovascular: Negative. Gastrointestinal: Negative. Endocrine: Negative. Genitourinary: Negative. Musculoskeletal: Negative. Skin: Negative. Allergic/Immunologic: Negative. Neurological: Negative. Hematological: Negative. Psychiatric/Behavioral: Negative. OBJECTIVE: /68   Pulse 62   Temp 98.6 °F (37 °C)   Resp 16   Wt 235 lb 3.2 oz (106.7 kg)   SpO2 93%   BMI 33.75 kg/m²     Wt Readings from Last 3 Encounters:   05/10/19 235 lb 3.2 oz (106.7 kg)   19 244 lb (110.7 kg)   19 240 lb 12.8 oz (109.2 kg)      GENERAL: - Alert, oriented, pleasant, in no apparent distress. HEENT: - Conjunctiva pink, no scleral icterus. ENT clear. NECK: -Supple. No jugular venous distention noted. No masses felt,  CARDIOVASCULAR: - Normal S1 and S2    PULMONARY: - No respiratory distress. + expiratory wheeze. ABDOMEN: - Soft and non-tender,no masses  ororganomegaly. EXTREMITIES: - No cyanosis, clubbing, or significant edema. SKIN: Skin is warm and dry. NEUROLOGICAL: - Cranial nerves II through XII are grossly intact. IMPRESSION:    Encounter Diagnoses   Name Primary?     Abdominal aortic aneurysm (AAA) without rupture (HCC)     [facility], [facility]     Coronary artery disease s/p MI, CABG x 4     Essential hypertension     Mixed hyperlipidemia     Suspected sleep apnea     Tobacco abuse disorder        ASSESSMENT/PLAN:    Abdominal aortic aneurysm (AAA) without rupture ([facility] [address]  CT abdomen 4/2018 showed 3.2 cm AAA. It was normal on CT abdomen in 10/2016  4/8/2019 US : 4.5 x 4.3 cm size : increased significantly. Vascular consultation and recheck in one year. CT abdomen : 3.3 cm : seen Dr Charisma Levine. Recheck in one year     Bilateral carotid artery disease [facility]  10/2016: Left ICA 50-69% and right ICA less than 50%   Dr Alpesh Muniz following that. Recheck in 6/17 per pt. But pt states it was cancelled. Pt will call and set appt  Carotid doppler : 7/2018 :  bilateral 0-49 %    Coronary artery disease s/p MI, CABG x 4  On statin, ASA, BB(coreg), ACE (lisinopril)  See cardiologist Dr Hemant Hodgson  No angina. Essential hypertension  Hypertension in control. Follow low salt diet. Continue current treatment. Continue amlodipine, lisinopril, coreg     Mixed hyperlipidemia  Hyperlipidemia is stable. Follow low cholesterol diet. Continue current treatment. Continue atorvastatin : in 2/19 was good     Suspected sleep apnea  Pt is suspected to have sleep apnea  He was referred 2 times but pt did not get any call per patient. Tobacco abuse disorder  Pt has quit on 10/20/16. Pt started smoking again  Patient is a smoker. Counseled to quit smoking. Various options given. Help number 1-800 QUIT NOW  given to patient. wheezing : will get PFT      Return to office in 3 months. Mediations reviewed with the patient. Continue current medications. Appropriate prescriptions are addressed. After visit summeryprovided. Follow up as directed sooner if needed. Questions answered and patient verbalizes understanding. Call for any problems, questions, or concerns.        Allergies   Allergen Reactions    Sulfa Antibiotics Rash     Current

## 2019-05-17 ASSESSMENT — ENCOUNTER SYMPTOMS
GASTROINTESTINAL NEGATIVE: 1
ALLERGIC/IMMUNOLOGIC NEGATIVE: 1
EYES NEGATIVE: 1
RESPIRATORY NEGATIVE: 1

## 2019-06-03 ENCOUNTER — HOSPITAL ENCOUNTER (OUTPATIENT)
Dept: CARDIAC REHAB | Age: 54
Discharge: HOME | End: 2019-06-03
Payer: COMMERCIAL

## 2019-06-03 PROCEDURE — 94640 AIRWAY INHALATION TREATMENT: CPT

## 2019-06-03 PROCEDURE — 94060 EVALUATION OF WHEEZING: CPT

## 2019-06-03 PROCEDURE — 6360000002 HC RX W HCPCS

## 2019-06-03 PROCEDURE — 94727 GAS DIL/WSHOT DETER LNG VOL: CPT

## 2019-06-03 PROCEDURE — 94729 DIFFUSING CAPACITY: CPT

## 2019-06-04 ENCOUNTER — TELEPHONE (OUTPATIENT)
Dept: INTERNAL MEDICINE CLINIC | Age: 54
End: 2019-06-04

## 2019-08-09 ENCOUNTER — OFFICE VISIT (OUTPATIENT)
Dept: INTERNAL MEDICINE CLINIC | Age: 54
End: 2019-08-09
Payer: COMMERCIAL

## 2019-08-09 VITALS
BODY MASS INDEX: 33.92 KG/M2 | RESPIRATION RATE: 16 BRPM | SYSTOLIC BLOOD PRESSURE: 130 MMHG | OXYGEN SATURATION: 93 % | TEMPERATURE: 98.2 F | DIASTOLIC BLOOD PRESSURE: 88 MMHG | WEIGHT: 236.4 LBS | HEART RATE: 76 BPM

## 2019-08-09 DIAGNOSIS — Z72.0 TOBACCO ABUSE DISORDER: ICD-10-CM

## 2019-08-09 DIAGNOSIS — I77.9 BILATERAL CAROTID ARTERY DISEASE, UNSPECIFIED TYPE (HCC): ICD-10-CM

## 2019-08-09 DIAGNOSIS — R29.818 SUSPECTED SLEEP APNEA: ICD-10-CM

## 2019-08-09 DIAGNOSIS — E78.2 MIXED HYPERLIPIDEMIA: ICD-10-CM

## 2019-08-09 DIAGNOSIS — I10 ESSENTIAL HYPERTENSION: ICD-10-CM

## 2019-08-09 DIAGNOSIS — J44.9 COPD, SEVERE (HCC): ICD-10-CM

## 2019-08-09 DIAGNOSIS — I71.40 ABDOMINAL AORTIC ANEURYSM (AAA) WITHOUT RUPTURE: ICD-10-CM

## 2019-08-09 DIAGNOSIS — I25.10 CORONARY ARTERY DISEASE INVOLVING NATIVE CORONARY ARTERY OF NATIVE HEART WITHOUT ANGINA PECTORIS: ICD-10-CM

## 2019-08-09 PROCEDURE — G8417 CALC BMI ABV UP PARAM F/U: HCPCS | Performed by: INTERNAL MEDICINE

## 2019-08-09 PROCEDURE — 4004F PT TOBACCO SCREEN RCVD TLK: CPT | Performed by: INTERNAL MEDICINE

## 2019-08-09 PROCEDURE — 99214 OFFICE O/P EST MOD 30 MIN: CPT | Performed by: INTERNAL MEDICINE

## 2019-08-09 PROCEDURE — G8427 DOCREV CUR MEDS BY ELIG CLIN: HCPCS | Performed by: INTERNAL MEDICINE

## 2019-08-09 PROCEDURE — G8599 NO ASA/ANTIPLAT THER USE RNG: HCPCS | Performed by: INTERNAL MEDICINE

## 2019-08-09 PROCEDURE — 3023F SPIROM DOC REV: CPT | Performed by: INTERNAL MEDICINE

## 2019-08-09 PROCEDURE — 3017F COLORECTAL CA SCREEN DOC REV: CPT | Performed by: INTERNAL MEDICINE

## 2019-08-09 PROCEDURE — G8926 SPIRO NO PERF OR DOC: HCPCS | Performed by: INTERNAL MEDICINE

## 2019-08-09 NOTE — PATIENT INSTRUCTIONS
corticosteroid medicines. · If you are using a corticosteroid inhaler, gargle and rinse out your mouth with water after use. Do not swallow the water. Swallowing the water will increase the chance that the medicine will get into your bloodstream. This may make it more likely that you will have side effects. To use a spacer with an inhaler  1. Shake the inhaler. Remove the inhaler cap, and place the mouthpiece of the inhaler into the spacer. Check the inhaler instructions to see if you need to prime your inhaler before you use it. If it needs priming, follow the instructions on how to prime your inhaler. 2. Remove the cap from the spacer. 3. Hold the inhaler upright with the mouthpiece at the bottom. 4. Tilt your head back a little, and breathe out slowly and completely. 5. Place the spacer's mouthpiece in your mouth. 6. Press down on the inhaler to spray one puff of medicine into the spacer, and then start breathing in slowly. Wait to inhale until after you have pressed down on the inhaler. Some spacers have a whistle. If you hear it, you should breathe in more slowly. 7. Hold your breath for 10 seconds. This will let the medicine settle in your lungs. 8. If you need to take a second dose, wait 30 to 60 seconds to allow the inhaler valve to refill. To use an inhaler without a spacer  1. Shake the inhaler as directed. Remove the cap. Check the instructions to see if you need to prime your inhaler before you use it. If it needs priming, follow the instructions on how to prime your inhaler. 2. Hold the inhaler upright with the mouthpiece at the bottom. 3. Tilt your head back a little, and breathe out slowly and completely. 4. Position the inhaler in one of two ways:  ? You can place the inhaler in your mouth. This is easier for most people. And it lowers the risk that any of the medicine will get into your eyes.   ? Or you can place the inhaler 1 to 2 inches in front of your open mouth, without closing your antibiotics.     · Oxygen therapy boosts the amount of oxygen in your blood and helps you breathe easier. Use the flow rate your doctor has recommended, and do not change it without talking to your doctor first.   Activity    · Get regular exercise. Walking is an easy way to get exercise. Start out slowly, and walk a little more each day.     · Pay attention to your breathing. You are exercising too hard if you cannot talk while you are exercising.     · Take short rest breaks when doing household chores and other activities.     · Learn breathing methods--such as breathing through pursed lips--to help you become less short of breath.     · If your doctor has not set you up with a pulmonary rehabilitation program, talk to him or her about whether rehab is right for you. Rehab includes exercise programs, education about your disease and how to manage it, help with diet and other changes, and emotional support. Diet    · Eat regular, healthy meals. Use bronchodilators about 1 hour before you eat to make it easier to eat. Eat several small meals instead of three large ones. Drink beverages at the end of the meal. Avoid foods that are hard to chew.     · Eat foods that contain protein so that you do not lose muscle mass.     · Talk with your doctor if you gain too much weight or if you lose weight without trying.    Mental health    · Talk to your family, friends, or a therapist about your feelings. It is normal to feel frightened, angry, hopeless, helpless, and even guilty. Talking openly about bad feelings can help you cope. If these feelings last, talk to your doctor. When should you call for help? Call 911 anytime you think you may need emergency care.  For example, call if:    · You have severe trouble breathing.    Call your doctor now or seek immediate medical care if:    · You have new or worse trouble breathing.     · You cough up blood.     · You have a fever.    Watch closely for changes in your health, and be sure to contact your doctor if:    · You cough more deeply or more often, especially if you notice more mucus or a change in the color of your mucus.     · You have new or worse swelling in your legs or belly.     · You are not getting better as expected. Where can you learn more? Go to https://chpepiceweb.[employer]. org and sign in to your [employer] account. Enter N582 in the GiveLoop box to learn more about \"Chronic Obstructive Pulmonary Disease (COPD): Care Instructions. \"     If you do not have an account, please click on the \"Sign Up Now\" link. Current as of: September 5, 2018  Content Version: 12.1  © 6729-4340 [employer]. Care instructions adapted under license by [facility] ([facility]). If you have questions about a medical condition or this instruction, always ask your healthcare professional. [place] [address] any warranty or liability for your use of this information. Patient Education        Learning About Benefits From Quitting Smoking  How does quitting smoking make you healthier? If you're thinking about quitting smoking, you may have a few reasons to be smoke-free. Your health may be one of them. · When you quit smoking, you lower your risks for cancer, lung disease, heart attack, stroke, blood vessel disease, and blindness from macular degeneration. · When you're smoke-free, you get sick less often, and you heal faster. You are less likely to get colds, flu, bronchitis, and pneumonia. · As a nonsmoker, you may find that your mood is better and you are less stressed. When and how will you feel healthier? Quitting has real health benefits that start from day 1 of being smoke-free. And the longer you stay smoke-free, the healthier you get and the better you feel. The first hours  · After just 20 minutes, your blood pressure and heart rate go down. That means there's less stress on your heart and blood vessels.   · Within 12 hours, the level of carbon monoxide in your blood drops back to normal. That makes room for more oxygen. With more oxygen in your body, you may notice that you have more energy than when you smoked. After 2 weeks  · Your lungs start to work better. · Your risk of heart attack starts to drop. After 1 month  · When your lungs are clear, you cough less and breathe deeper, so it's easier to be active. · Your sense of taste and smell return. That means you can enjoy food more than you have since you started smoking. Over the years  · After 1 year, your risk of heart disease is half what it would be if you kept smoking. · After 5 years, your risk of stroke starts to shrink. Within a few years after that, it's about the same as if you'd never smoked. · After 10 years, your risk of dying from lung cancer is cut by about half. And your risk for many other types of cancer is lower too. How would quitting help others in your life? When you quit smoking, you improve the health of everyone who now breathes in your smoke. · Their heart, lung, and cancer risks drop, much like yours. · They are sick less. For babies and small children, living smoke-free means they're less likely to have ear infections, pneumonia, and bronchitis. · If you're a woman who is or will be pregnant someday, quitting smoking means a healthier . · Children who are close to you are less likely to become adult smokers. Where can you learn more? Go to https://F[employer] Socialchandrakant.Care Team Connect. org and sign in to your [employer] Experts account. Enter [id number] [id number] in the [facility] box to learn more about \"Learning About Benefits From Quitting Smoking. \"     If you do not have an account, please click on the \"Sign Up Now\" link. Current as of: 2018  Content Version: 12.1  © 9914-6721 [employer]. Care instructions adapted under license by [facility] ([facility]).  If you have questions about a medical condition or this instruction, always ask your healthcare professional. [address] any warranty or liability for your use of this information. Patient Education        Stopping Smoking: Care Instructions  Your Care Instructions  Cigarette smokers crave the nicotine in cigarettes. Giving it up is much harder than simply changing a habit. Your body has to stop craving the nicotine. It is hard to quit, but you can do it. There are many tools that people use to quit smoking. You may find that combining tools works best for you. There are several steps to quitting. First you get ready to quit. Then you get support to help you. After that, you learn new skills and behaviors to become a nonsmoker. For many people, a necessary step is getting and using medicine. Your doctor will help you set up the plan that best meets your needs. You may want to attend a smoking cessation program to help you quit smoking. When you choose a program, look for one that has proven success. Ask your doctor for ideas. You will greatly increase your chances of success if you take medicine as well as get counseling or join a cessation program.  Some of the changes you feel when you first quit tobacco are uncomfortable. Your body will miss the nicotine at first, and you may feel short-tempered and grumpy. You may have trouble sleeping or concentrating. Medicine can help you deal with these symptoms. You may struggle with changing your smoking habits and rituals. The last step is the tricky one: Be prepared for the smoking urge to continue for a time. This is a lot to deal with, but keep at it. You will feel better. Follow-up care is a key part of your treatment and safety. Be sure to make and go to all appointments, and call your doctor if you are having problems. It's also a good idea to know your test results and keep a list of the medicines you take. How can you care for yourself at home? · Ask your family, friends, and coworkers for support.  You have a

## 2019-08-09 NOTE — ASSESSMENT & PLAN NOTE
10/2016: Left ICA 50-69% and right ICA less than 50%   Dr Jarocho Buitrago following that. Recheck in 6/17 per pt. But pt states it was cancelled.  Pt will call and set appt  Carotid doppler : 7/2018 :  bilateral 0-49 %

## 2019-08-09 NOTE — PROGRESS NOTES
Destiny New  Patient's  is 1965  Seen in office on 2019      SUBJECTIVE:  Mony stark 52 y. o.year old male presents today   Chief Complaint   Patient presents with    Hypertension    Hyperlipidemia    Fatigue     Patient is here for f/u of HTN HLD   Pt states he smokes1- 1.5 ppd for 30 years   Wants to quit smoking. Pt drinks 2 days a week 4-6 pack of beer. Patient has hypertension. Taking medications No headaches, no chest pain, no palpitations and no dizziness. Patient has hyperlipidemia. Taking medications. No abdominal pain, no nausea or vomiting. No myalgias. C/o SOB and gets tired after work and goes to sleep. Had PFTs done and shows sever obstructive disease. Taking medications regularly. No side effects noted. Patient has trace pedal edema he did get worse especially in the left leg as the day progresses. He is not using compression stockings. He had veins removed from the left leg for CABG. Review of Systems   Constitutional: Positive for fatigue. HENT: Negative. Eyes: Negative. Respiratory: Positive for shortness of breath. Negative for cough and wheezing. Cardiovascular: Positive for leg swelling. Negative for chest pain and palpitations. Gastrointestinal: Negative. Endocrine: Negative. Genitourinary: Negative. Musculoskeletal: Negative. Skin: Negative. Allergic/Immunologic: Negative. Neurological: Negative. Hematological: Negative. Psychiatric/Behavioral: Negative. OBJECTIVE: /88   Pulse 76   Temp 98.2 °F (36.8 °C) (Oral)   Resp 16   Wt 236 lb 6.4 oz (107.2 kg)   SpO2 93%   BMI 33.92 kg/m²     Wt Readings from Last 3 Encounters:   19 236 lb 6.4 oz (107.2 kg)   05/10/19 235 lb 3.2 oz (106.7 kg)   19 244 lb (110.7 kg)      GENERAL: - Alert, oriented, pleasant, in no apparent distress. Obese   HEENT: - Conjunctiva pink, no scleral icterus. ENT clear. NECK: -Supple. No jugular venous distention noted.  No 02/15/2019     02/15/2019    K 3.5 02/15/2019    ALT 14 02/06/2019    AST 14 02/06/2019    GLUCOSE 110 02/15/2019     PT/INR:   Lab Results   Component Value Date    INR 1.07 10/24/2016     A1C:   Lab Results   Component Value Date    LABA1C 6.2 03/05/2019           Khanh Hines MD, 8/9/2019 , 9:04 AM

## 2019-08-10 ASSESSMENT — ENCOUNTER SYMPTOMS
EYES NEGATIVE: 1
COUGH: 0
ALLERGIC/IMMUNOLOGIC NEGATIVE: 1
GASTROINTESTINAL NEGATIVE: 1
WHEEZING: 0
SHORTNESS OF BREATH: 1

## 2019-09-06 ENCOUNTER — OFFICE VISIT (OUTPATIENT)
Dept: INTERNAL MEDICINE CLINIC | Age: 54
End: 2019-09-06
Payer: COMMERCIAL

## 2019-09-06 VITALS
WEIGHT: 236 LBS | SYSTOLIC BLOOD PRESSURE: 130 MMHG | HEIGHT: 76 IN | TEMPERATURE: 98.7 F | OXYGEN SATURATION: 90 % | DIASTOLIC BLOOD PRESSURE: 80 MMHG | BODY MASS INDEX: 28.74 KG/M2 | HEART RATE: 70 BPM

## 2019-09-06 DIAGNOSIS — I10 ESSENTIAL HYPERTENSION: ICD-10-CM

## 2019-09-06 DIAGNOSIS — Z12.5 SCREENING FOR PROSTATE CANCER: ICD-10-CM

## 2019-09-06 DIAGNOSIS — E78.2 MIXED HYPERLIPIDEMIA: ICD-10-CM

## 2019-09-06 DIAGNOSIS — Z72.0 TOBACCO ABUSE DISORDER: ICD-10-CM

## 2019-09-06 DIAGNOSIS — R29.818 SUSPECTED SLEEP APNEA: ICD-10-CM

## 2019-09-06 DIAGNOSIS — J44.9 COPD, SEVERE (HCC): Primary | ICD-10-CM

## 2019-09-06 PROCEDURE — 90471 IMMUNIZATION ADMIN: CPT | Performed by: INTERNAL MEDICINE

## 2019-09-06 PROCEDURE — 3023F SPIROM DOC REV: CPT | Performed by: INTERNAL MEDICINE

## 2019-09-06 PROCEDURE — 4004F PT TOBACCO SCREEN RCVD TLK: CPT | Performed by: INTERNAL MEDICINE

## 2019-09-06 PROCEDURE — 99213 OFFICE O/P EST LOW 20 MIN: CPT | Performed by: INTERNAL MEDICINE

## 2019-09-06 PROCEDURE — G8599 NO ASA/ANTIPLAT THER USE RNG: HCPCS | Performed by: INTERNAL MEDICINE

## 2019-09-06 PROCEDURE — G8926 SPIRO NO PERF OR DOC: HCPCS | Performed by: INTERNAL MEDICINE

## 2019-09-06 PROCEDURE — 3017F COLORECTAL CA SCREEN DOC REV: CPT | Performed by: INTERNAL MEDICINE

## 2019-09-06 PROCEDURE — 90686 IIV4 VACC NO PRSV 0.5 ML IM: CPT | Performed by: INTERNAL MEDICINE

## 2019-09-06 PROCEDURE — G8427 DOCREV CUR MEDS BY ELIG CLIN: HCPCS | Performed by: INTERNAL MEDICINE

## 2019-09-06 PROCEDURE — G8417 CALC BMI ABV UP PARAM F/U: HCPCS | Performed by: INTERNAL MEDICINE

## 2019-09-06 RX ORDER — ALBUTEROL SULFATE 90 UG/1
2 AEROSOL, METERED RESPIRATORY (INHALATION) EVERY 4 HOURS PRN
Qty: 1 INHALER | Refills: 1 | Status: SHIPPED | OUTPATIENT
Start: 2019-09-06 | End: 2021-01-14

## 2019-09-06 RX ORDER — ATORVASTATIN CALCIUM 40 MG/1
40 TABLET, FILM COATED ORAL DAILY
Qty: 30 TABLET | Refills: 5 | Status: SHIPPED | OUTPATIENT
Start: 2019-09-06 | End: 2020-06-29

## 2019-11-04 ENCOUNTER — NURSE ONLY (OUTPATIENT)
Dept: INTERNAL MEDICINE CLINIC | Age: 54
End: 2019-11-04

## 2019-11-04 DIAGNOSIS — E78.2 MIXED HYPERLIPIDEMIA: ICD-10-CM

## 2019-11-04 DIAGNOSIS — Z12.5 SCREENING FOR PROSTATE CANCER: ICD-10-CM

## 2019-11-04 DIAGNOSIS — I10 ESSENTIAL HYPERTENSION: ICD-10-CM

## 2019-11-04 LAB
A/G RATIO: 2.6 (ref 1.1–2.2)
ALBUMIN SERPL-MCNC: 4.7 G/DL (ref 3.4–5)
ALP BLD-CCNC: 67 U/L (ref 40–129)
ALT SERPL-CCNC: 38 U/L (ref 10–40)
ANION GAP SERPL CALCULATED.3IONS-SCNC: 16 MMOL/L (ref 3–16)
AST SERPL-CCNC: 24 U/L (ref 15–37)
BILIRUB SERPL-MCNC: 0.5 MG/DL (ref 0–1)
BUN BLDV-MCNC: 16 MG/DL (ref 7–20)
CALCIUM SERPL-MCNC: 9.2 MG/DL (ref 8.3–10.6)
CHLORIDE BLD-SCNC: 100 MMOL/L (ref 99–110)
CHOLESTEROL, FASTING: 164 MG/DL (ref 0–199)
CO2: 25 MMOL/L (ref 21–32)
CREAT SERPL-MCNC: 0.8 MG/DL (ref 0.9–1.3)
GFR AFRICAN AMERICAN: >60
GFR NON-AFRICAN AMERICAN: >60
GLOBULIN: 1.8 G/DL
GLUCOSE BLD-MCNC: 109 MG/DL (ref 70–99)
HDLC SERPL-MCNC: 53 MG/DL (ref 40–60)
LDL CHOLESTEROL CALCULATED: 97 MG/DL
POTASSIUM SERPL-SCNC: 4.4 MMOL/L (ref 3.5–5.1)
PROSTATE SPECIFIC ANTIGEN: 0.6 NG/ML (ref 0–4)
SODIUM BLD-SCNC: 141 MMOL/L (ref 136–145)
TOTAL PROTEIN: 6.5 G/DL (ref 6.4–8.2)
TRIGLYCERIDE, FASTING: 68 MG/DL (ref 0–150)
VLDLC SERPL CALC-MCNC: 14 MG/DL

## 2019-11-04 PROCEDURE — 36415 COLL VENOUS BLD VENIPUNCTURE: CPT | Performed by: INTERNAL MEDICINE

## 2020-01-06 ENCOUNTER — OFFICE VISIT (OUTPATIENT)
Dept: INTERNAL MEDICINE CLINIC | Age: 55
End: 2020-01-06
Payer: COMMERCIAL

## 2020-01-06 VITALS
HEART RATE: 72 BPM | DIASTOLIC BLOOD PRESSURE: 60 MMHG | SYSTOLIC BLOOD PRESSURE: 112 MMHG | RESPIRATION RATE: 16 BRPM | TEMPERATURE: 97.6 F | WEIGHT: 253.2 LBS | BODY MASS INDEX: 30.82 KG/M2 | OXYGEN SATURATION: 93 %

## 2020-01-06 PROBLEM — R63.5 WEIGHT GAIN: Status: ACTIVE | Noted: 2020-01-06

## 2020-01-06 PROCEDURE — 3023F SPIROM DOC REV: CPT | Performed by: INTERNAL MEDICINE

## 2020-01-06 PROCEDURE — G8482 FLU IMMUNIZE ORDER/ADMIN: HCPCS | Performed by: INTERNAL MEDICINE

## 2020-01-06 PROCEDURE — 4004F PT TOBACCO SCREEN RCVD TLK: CPT | Performed by: INTERNAL MEDICINE

## 2020-01-06 PROCEDURE — G8427 DOCREV CUR MEDS BY ELIG CLIN: HCPCS | Performed by: INTERNAL MEDICINE

## 2020-01-06 PROCEDURE — 99213 OFFICE O/P EST LOW 20 MIN: CPT | Performed by: INTERNAL MEDICINE

## 2020-01-06 PROCEDURE — G8926 SPIRO NO PERF OR DOC: HCPCS | Performed by: INTERNAL MEDICINE

## 2020-01-06 PROCEDURE — 3017F COLORECTAL CA SCREEN DOC REV: CPT | Performed by: INTERNAL MEDICINE

## 2020-01-06 PROCEDURE — G8417 CALC BMI ABV UP PARAM F/U: HCPCS | Performed by: INTERNAL MEDICINE

## 2020-01-06 ASSESSMENT — ENCOUNTER SYMPTOMS
COUGH: 1
WHEEZING: 0
EYES NEGATIVE: 1
SHORTNESS OF BREATH: 0
ALLERGIC/IMMUNOLOGIC NEGATIVE: 1
GASTROINTESTINAL NEGATIVE: 1

## 2020-01-06 ASSESSMENT — PATIENT HEALTH QUESTIONNAIRE - PHQ9
SUM OF ALL RESPONSES TO PHQ QUESTIONS 1-9: 0
SUM OF ALL RESPONSES TO PHQ QUESTIONS 1-9: 0
2. FEELING DOWN, DEPRESSED OR HOPELESS: 0
SUM OF ALL RESPONSES TO PHQ9 QUESTIONS 1 & 2: 0
1. LITTLE INTEREST OR PLEASURE IN DOING THINGS: 0

## 2020-01-06 NOTE — ASSESSMENT & PLAN NOTE
Had MI in 10/16,Cath revealed 100% occluded RCA, 90% LAD and circumflex disease.   CABG x 4 on 10/24/16: Dr Pilar Maria,  LIMA to LAd and D1  SVG to PDA  svg to ramus  On statin, ASA, BB(coreg), ACE (lisinopril)  Sees cardiologist Dr Santos Olivares

## 2020-01-06 NOTE — ASSESSMENT & PLAN NOTE
PFTs done 6/2019 : severe obstructive disease. Patient is taking albuterol inhaler and incruse  Feeling well.

## 2020-01-06 NOTE — ASSESSMENT & PLAN NOTE
Pt has quit on 10/20/16. Pt started smoking again  Patient is a smoker. Counseled to quit smoking. Various options given. Help number 1-800 QUIT NOW  given to patient.   Decrease to 1/2 ppd

## 2020-01-06 NOTE — ASSESSMENT & PLAN NOTE
Takes amlodipine, lisinopril and Coreg  Hypertension in control. Follow low salt diet. Continue current treatment.

## 2020-01-06 NOTE — PROGRESS NOTES
Khushboo Robles  Patient's  is 1965  Seen in office on 2020      SUBJECTIVE:  Isabell Avalos mi 62 y. o.year old male presents today   Chief Complaint   Patient presents with    COPD    Medication Refill     Of COPD, hypertension, hyperlipidemia, coronary artery disease  Patient states he is breathing better. Chronic cough or congestion. Using the inhalers  Patient has hypertension. Taking medications No headaches, no chest pain, no palpitations and no dizziness. Patient has hyperlipidemia. Taking medications. No abdominal pain, no nausea or vomiting. No myalgias. Patient is a smoking but he states he decreased his smoking to half a pack per day. Patient states he has gained a lot of weight. He states he is eating a lot  Taking medications regularly. No side effects noted. Review of Systems   Constitutional: Negative. HENT: Negative. Eyes: Negative. Respiratory: Positive for cough. Negative for shortness of breath and wheezing. Cardiovascular: Negative. Negative for chest pain and leg swelling. Gastrointestinal: Negative. Endocrine: Negative. Genitourinary: Negative. Musculoskeletal: Negative. Allergic/Immunologic: Negative. Neurological: Negative. Hematological: Negative. Psychiatric/Behavioral: Negative. OBJECTIVE: /60   Pulse 72   Temp 97.6 °F (36.4 °C) (Oral)   Resp 16   Wt 253 lb 3.2 oz (114.9 kg)   SpO2 93%   BMI 30.82 kg/m²      Wt Readings from Last 3 Encounters:   20 253 lb 3.2 oz (114.9 kg)   19 236 lb (107 kg)   19 236 lb 6.4 oz (107.2 kg)      GENERAL: - Alert, oriented, pleasant, in no apparent distress. Obese   HEENT: - Conjunctiva pink, no scleral icterus. ENT clear. NECK: -Supple. No jugular venous distention noted. No masses felt,  CARDIOVASCULAR: - Normal S1 and S2    PULMONARY: - No respiratory distress. No wheezes or rales. ABDOMEN: - Soft and non-tender,no masses  ororganomegaly.   EXTREMITIES: - No cyanosis, clubbing, or significant edema. SKIN: Skin is warm and dry. NEUROLOGICAL: - Cranial nerves II through XII are grossly intact. IMPRESSION:    Encounter Diagnoses   Name Primary?  COPD, severe ([address]     Coronary artery disease s/p MI, CABG x 4     Essential hypertension     Mixed hyperlipidemia     Suspected sleep apnea     Tobacco abuse disorder     Weight gain        ASSESSMENT/PLAN:    COPD, severe (HCC)  PFTs done 6/2019 : severe obstructive disease. Patient is taking albuterol inhaler and incruse  Feeling well. Coronary artery disease s/p MI, CABG x 4  Had MI in 10/16,Cath revealed 100% occluded RCA, 90% LAD and circumflex disease. CABG x 4 on 10/24/16: Dr Raymond Allen,  LIMA to LAd and D1  SVG to PDA  svg to ramus  On statin, ASA, BB(coreg), ACE (lisinopril)  Sees cardiologist Dr Sanjeev Pena hypertension  Takes amlodipine, lisinopril and Coreg  Hypertension in control. Follow low salt diet. Continue current treatment. Mixed hyperlipidemia  Lipid profile is good. Continue atorvastatin. Suspected sleep apnea  Pt was sent for sleep study evaluation. appt was canceled per pt. He will call again. Tobacco abuse disorder  Pt has quit on 10/20/16. Pt started smoking again  Patient is a smoker. Counseled to quit smoking. Various options given. Help number 1-800 QUIT NOW  given to patient. Decrease to 1/2 ppd    Weight gain  Gained 17 lbs     Return to office in 3 months. Mediations reviewed with the patient. Continue current medications. Appropriate prescriptions are addressed. After visit summeryprovided. Follow up as directed sooner if needed. Questions answered and patient verbalizes understanding. Call for any problems, questions, or concerns.        Allergies   Allergen Reactions    Sulfa Antibiotics Rash     Current Outpatient Medications   Medication Sig Dispense Refill    atorvastatin (LIPITOR) 40 MG tablet Take 1 tablet by mouth daily 30 tablet 5  Umeclidinium Bromide (INCRUSE ELLIPTA) 62.5 MCG/INH AEPB Inhale 1 puff into the lungs daily 1 each 2    amLODIPine (NORVASC) 5 MG tablet Take 1 tablet by mouth daily 30 tablet 5    carvedilol (COREG) 6.25 MG tablet Take 1 tablet by mouth 2 times daily 60 tablet 5    lisinopril (PRINIVIL;ZESTRIL) 5 MG tablet Take 1 tablet by mouth daily 30 tablet 5    aspirin 81 MG EC tablet Take 1 tablet by mouth daily 30 tablet 5    albuterol sulfate HFA (PROVENTIL HFA) 108 (90 Base) MCG/ACT inhaler Inhale 2 puffs into the lungs every 4 hours as needed for Wheezing or Shortness of Breath With spacer (and mask if indicated). Thanks. 1 Inhaler 1     No current facility-administered medications for this visit. Past Medical History:   Diagnosis Date    Acute gout of left ankle 2/2/2017    Resolved. Uric acid improved after d/c HCTZ    COPD, severe ([address] 8/9/2019    PFTs done 6/2019 : severe obstructive disease.  Coronary artery disease involving native coronary artery of native heart without angina pectoris 11/04/2016    CABG x 4 on 9/24/16: Dr Armen Christie H/O colonoscopy 3-30-15    Dr. Diane Pimentel, rtoin 5 yrs  (polypectomy)    H/O Doppler ultrasound 07/19/2018    Carotid Doppler. Mild (0-49%) disease of bilateral internal carotid arteries, bilateral common carotid artery bulb to proximal ICA exhibits calcific plaque, left distal ICA end diastolic velocity is elevated but ratio is below 2.0, normal vertebral flow.  Hyperlipidemia     Hypertension     Incidental lung nodule 4 mm RUL. f/u in 10/15 4/22/2015    Ct shoulder showed 4 mm nodule in RUL in 4/15,CT chest 9/15 5mm. f/u in 4/16    Kidney stone on right side 9/29/2016    Large stone on right side on CT abdomen of 10/16    Nephrolithiasis     Right shoulder pain 3/17/2015    Pt had seen Dr. José Escoto and had arthrogram and CT shoulder. Showed possible tear of biceps tendon.  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 Loyda Chung. 5yr    HERNIA REPAIR      umbilical hernia    KIDNEY STONE SURGERY       Social History     Tobacco Use    Smoking status: Current Every Day Smoker     Packs/day: 1.00     Years: 1.00     Pack years: 1.00     Types: Cigarettes     Start date: 7/6/2017    Smokeless tobacco: Never Used   Substance Use Topics    Alcohol use:  Yes     Alcohol/week: 6.0 standard drinks     Types: 6 Standard drinks or equivalent per week     Comment: 3 or 4 beers per day, most days       LAB REVIEW:  CBC:   Lab Results   Component Value Date    WBC 9.3 02/15/2019    HGB 16.8 02/15/2019    HCT 50.5 02/15/2019     02/15/2019     Lipids:   Lab Results   Component Value Date    CHOL 115 02/03/2017    TRIG 64 02/03/2017    HDL 53 11/04/2019    LDLCALC 97 11/04/2019    LDLDIRECT 73 11/02/2018    TRIGLYCFAST 68 11/04/2019     Renal:   Lab Results   Component Value Date    BUN 16 11/04/2019    CREATININE 0.8 11/04/2019     11/04/2019    K 4.4 11/04/2019    ALT 38 11/04/2019    AST 24 11/04/2019    GLUCOSE 109 11/04/2019     PT/INR:   Lab Results   Component Value Date    INR 1.07 10/24/2016     A1C:   Lab Results   Component Value Date    LABA1C 6.2 03/05/2019           Jcarlos Sierra MD, 1/6/2020 , 2:36 PM

## 2020-01-12 RX ORDER — LISINOPRIL 5 MG/1
5 TABLET ORAL DAILY
Qty: 30 TABLET | Refills: 5 | Status: SHIPPED | OUTPATIENT
Start: 2020-01-12 | End: 2020-10-26

## 2020-03-16 RX ORDER — AMLODIPINE BESYLATE 5 MG/1
5 TABLET ORAL DAILY
Qty: 30 TABLET | Refills: 5 | Status: SHIPPED | OUTPATIENT
Start: 2020-03-16 | End: 2020-10-05

## 2020-04-27 RX ORDER — CARVEDILOL 6.25 MG/1
6.25 TABLET ORAL 2 TIMES DAILY
Qty: 60 TABLET | Refills: 5 | Status: SHIPPED | OUTPATIENT
Start: 2020-04-27 | End: 2020-12-11

## 2020-06-29 RX ORDER — ATORVASTATIN CALCIUM 40 MG/1
TABLET, FILM COATED ORAL
Qty: 30 TABLET | Refills: 5 | Status: SHIPPED | OUTPATIENT
Start: 2020-06-29 | End: 2021-01-22 | Stop reason: SDUPTHER

## 2020-07-22 ENCOUNTER — OFFICE VISIT (OUTPATIENT)
Dept: INTERNAL MEDICINE CLINIC | Age: 55
End: 2020-07-22
Payer: COMMERCIAL

## 2020-07-22 VITALS
OXYGEN SATURATION: 94 % | DIASTOLIC BLOOD PRESSURE: 72 MMHG | WEIGHT: 245.8 LBS | HEART RATE: 68 BPM | SYSTOLIC BLOOD PRESSURE: 118 MMHG | RESPIRATION RATE: 16 BRPM | TEMPERATURE: 97.4 F | BODY MASS INDEX: 29.92 KG/M2

## 2020-07-22 LAB
A/G RATIO: 2.3 (ref 1.1–2.2)
ALBUMIN SERPL-MCNC: 4.3 G/DL (ref 3.4–5)
ALP BLD-CCNC: 57 U/L (ref 40–129)
ALT SERPL-CCNC: 16 U/L (ref 10–40)
ANION GAP SERPL CALCULATED.3IONS-SCNC: 7 MMOL/L (ref 3–16)
AST SERPL-CCNC: 16 U/L (ref 15–37)
BASOPHILS ABSOLUTE: 0.1 K/UL (ref 0–0.2)
BASOPHILS RELATIVE PERCENT: 0.8 %
BILIRUB SERPL-MCNC: 0.6 MG/DL (ref 0–1)
BUN BLDV-MCNC: 16 MG/DL (ref 7–20)
CALCIUM SERPL-MCNC: 9.4 MG/DL (ref 8.3–10.6)
CHLORIDE BLD-SCNC: 100 MMOL/L (ref 99–110)
CHOLESTEROL, FASTING: 119 MG/DL (ref 0–199)
CO2: 34 MMOL/L (ref 21–32)
CREAT SERPL-MCNC: 0.7 MG/DL (ref 0.9–1.3)
EOSINOPHILS ABSOLUTE: 0.2 K/UL (ref 0–0.6)
EOSINOPHILS RELATIVE PERCENT: 2.3 %
GFR AFRICAN AMERICAN: >60
GFR NON-AFRICAN AMERICAN: >60
GLOBULIN: 1.9 G/DL
GLUCOSE BLD-MCNC: 121 MG/DL (ref 70–99)
HCT VFR BLD CALC: 52.8 % (ref 40.5–52.5)
HDLC SERPL-MCNC: 38 MG/DL (ref 40–60)
HEMOGLOBIN: 17.6 G/DL (ref 13.5–17.5)
LDL CHOLESTEROL CALCULATED: 67 MG/DL
LYMPHOCYTES ABSOLUTE: 1.9 K/UL (ref 1–5.1)
LYMPHOCYTES RELATIVE PERCENT: 25 %
MCH RBC QN AUTO: 32.1 PG (ref 26–34)
MCHC RBC AUTO-ENTMCNC: 33.3 G/DL (ref 31–36)
MCV RBC AUTO: 96.3 FL (ref 80–100)
MONOCYTES ABSOLUTE: 0.6 K/UL (ref 0–1.3)
MONOCYTES RELATIVE PERCENT: 8.1 %
NEUTROPHILS ABSOLUTE: 4.9 K/UL (ref 1.7–7.7)
NEUTROPHILS RELATIVE PERCENT: 63.8 %
PDW BLD-RTO: 12.8 % (ref 12.4–15.4)
PLATELET # BLD: 250 K/UL (ref 135–450)
PMV BLD AUTO: 8.1 FL (ref 5–10.5)
POTASSIUM SERPL-SCNC: 4.8 MMOL/L (ref 3.5–5.1)
RBC # BLD: 5.49 M/UL (ref 4.2–5.9)
SODIUM BLD-SCNC: 141 MMOL/L (ref 136–145)
TOTAL PROTEIN: 6.2 G/DL (ref 6.4–8.2)
TRIGLYCERIDE, FASTING: 68 MG/DL (ref 0–150)
VLDLC SERPL CALC-MCNC: 14 MG/DL
WBC # BLD: 7.8 K/UL (ref 4–11)

## 2020-07-22 PROCEDURE — 99214 OFFICE O/P EST MOD 30 MIN: CPT | Performed by: INTERNAL MEDICINE

## 2020-07-22 PROCEDURE — G8427 DOCREV CUR MEDS BY ELIG CLIN: HCPCS | Performed by: INTERNAL MEDICINE

## 2020-07-22 PROCEDURE — 90715 TDAP VACCINE 7 YRS/> IM: CPT | Performed by: INTERNAL MEDICINE

## 2020-07-22 PROCEDURE — 90471 IMMUNIZATION ADMIN: CPT | Performed by: INTERNAL MEDICINE

## 2020-07-22 PROCEDURE — 3017F COLORECTAL CA SCREEN DOC REV: CPT | Performed by: INTERNAL MEDICINE

## 2020-07-22 PROCEDURE — 4004F PT TOBACCO SCREEN RCVD TLK: CPT | Performed by: INTERNAL MEDICINE

## 2020-07-22 PROCEDURE — 36415 COLL VENOUS BLD VENIPUNCTURE: CPT | Performed by: INTERNAL MEDICINE

## 2020-07-22 PROCEDURE — G8417 CALC BMI ABV UP PARAM F/U: HCPCS | Performed by: INTERNAL MEDICINE

## 2020-07-22 PROCEDURE — 3023F SPIROM DOC REV: CPT | Performed by: INTERNAL MEDICINE

## 2020-07-22 PROCEDURE — G8926 SPIRO NO PERF OR DOC: HCPCS | Performed by: INTERNAL MEDICINE

## 2020-07-22 ASSESSMENT — ENCOUNTER SYMPTOMS
COUGH: 0
GASTROINTESTINAL NEGATIVE: 1
WHEEZING: 0
EYES NEGATIVE: 1
ALLERGIC/IMMUNOLOGIC NEGATIVE: 1
SHORTNESS OF BREATH: 0
RESPIRATORY NEGATIVE: 1

## 2020-07-22 NOTE — PROGRESS NOTES
Uriel Ocasio  Patient's  is 1965  Seen in office on 2020      SUBJECTIVE:  Lesa Nj im 62 y. o.year old male presents today   Chief Complaint   Patient presents with    COPD    Hypertension     Pt is here for f/u of COPD, HTN HLD   Breathing is stable. Smoking again and now at 1ppd  Unable to quit smoking. Patient has hypertension. Taking medications No headaches, no chest pain, no palpitations and no dizziness. Patient has hyperlipidemia. Taking medications. No abdominal pain, no nausea or vomiting. No myalgias. No kidney stone colic  Pt has CAD : no angina. Pt states he has not called Dr Aron Lawson. Taking medications regularly. No side effects noted. Review of Systems   Constitutional: Negative. HENT: Negative. Eyes: Negative. Respiratory: Negative. Negative for cough, shortness of breath and wheezing. Cardiovascular: Negative. Negative for chest pain, palpitations and leg swelling. Gastrointestinal: Negative. Endocrine: Negative. Genitourinary: Negative. Musculoskeletal: Negative. Skin: Negative. Allergic/Immunologic: Negative. Neurological: Negative. Hematological: Negative. Psychiatric/Behavioral: Negative. OBJECTIVE: /72   Pulse 68   Temp 97.4 °F (36.3 °C) (Infrared)   Resp 16   Wt 245 lb 12.8 oz (111.5 kg)   SpO2 94%   BMI 29.92 kg/m²     Wt Readings from Last 3 Encounters:   20 245 lb 12.8 oz (111.5 kg)   20 253 lb 3.2 oz (114.9 kg)   19 236 lb (107 kg)      GENERAL: - Alert, oriented, pleasant, in no apparent distress. HEENT: - Conjunctiva pink, no scleral icterus. ENT clear. NECK: -Supple. No jugular venous distention noted. No masses felt,  CARDIOVASCULAR: - Normal S1 and S2    PULMONARY: - No respiratory distress. No wheezes or rales. Few rhonchi. ABDOMEN: - Soft and non-tender,no masses  ororganomegaly. EXTREMITIES: - No cyanosis, clubbing, or significant edema. SKIN: Skin is warm and dry. NEUROLOGICAL: - Cranial nerves II through XII are grossly intact. IMPRESSION:    Encounter Diagnoses   Name Primary?  COPD, severe ([address] Yes    Abdominal aortic aneurysm (AAA) without rupture ([address]     Coronary artery disease s/p MI, CABG x 4     Essential hypertension     Incidental lung nodule 4 mm RUL. f/u in 10/15     Mixed hyperlipidemia     Suspected sleep apnea     Tobacco abuse disorder     Weight gain     Bilateral carotid artery disease, unspecified type ([address]     Prediabetes        ASSESSMENT/PLAN:    COPD :   PFTs done 6/2019 : severe obstructive disease. Patient is taking albuterol inhaler and incruse  Feeling well. Essential hypertension  Takes amlodipine, lisinopril and Coreg  Hypertension in control. Follow low salt diet. Continue current treatment.     CAD : s/p CABG     On statin, ASA, BB(coreg), ACE (lisinopril)  Sees cardiologist Dr Talia Blanco but has not seen him  Advised him to make appt .     Mixed hyperlipidemia  Will check lipid   Continue atorvastatin.      Suspected sleep apnea  Pt was sent for sleep study evaluation. appt was canceled per pt. Will send him again.      Tobacco abuse disorder  Pt has quit on 10/20/16. Pt started smoking again 1ppd  Patient is a smoker. Counseled to quit smoking. Various options given. Help number 1-800 QUIT NOW  given to patient.     Weight gain  Had gained wt , now decreased 8 lbs in last 6 months. AAA stable : f/u with cardiovascular surgeon for it and for carotid art disease    Prediabetes : check hga1c       Health maintenance \" Tdap given today          Return to office in 3 months. Orders Placed This Encounter   Procedures    Comprehensive Metabolic Panel    Lipid, Fasting    CBC Auto Differential    Hemoglobin A1C                Mediations reviewed with the patient. Continue current medications. Appropriate prescriptions are addressed. After visit summeryprovided. Follow up as directed sooner if needed.   Questions answered and patient verbalizes understanding. Call for any problems, questions, or concerns. Allergies   Allergen Reactions    Sulfa Antibiotics Rash     Current Outpatient Medications   Medication Sig Dispense Refill    atorvastatin (LIPITOR) 40 MG tablet TAKE 1 TABLET BY MOUTH DAILY **STOP PRAVASTATIN** 30 tablet 5    carvedilol (COREG) 6.25 MG tablet TAKE 1 TABLET BY MOUTH 2 TIMES DAILY 60 tablet 5    amLODIPine (NORVASC) 5 MG tablet TAKE 1 TABLET BY MOUTH DAILY 30 tablet 5    Umeclidinium Bromide (INCRUSE ELLIPTA) 62.5 MCG/INH AEPB Inhale 1 puff into the lungs daily 1 each 5    lisinopril (PRINIVIL;ZESTRIL) 5 MG tablet Take 1 tablet by mouth daily 30 tablet 5    aspirin 81 MG EC tablet Take 1 tablet by mouth daily 30 tablet 5    albuterol sulfate HFA (PROVENTIL HFA) 108 (90 Base) MCG/ACT inhaler Inhale 2 puffs into the lungs every 4 hours as needed for Wheezing or Shortness of Breath With spacer (and mask if indicated). Thanks. 1 Inhaler 1     No current facility-administered medications for this visit. Past Medical History:   Diagnosis Date    Acute gout of left ankle 2/2/2017    Resolved. Uric acid improved after d/c HCTZ    COPD, severe ([address] 8/9/2019    PFTs done 6/2019 : severe obstructive disease.  Coronary artery disease involving native coronary artery of native heart without angina pectoris 11/04/2016    CABG x 4 on 9/24/16: Dr Shelia Patel H/O colonoscopy 3-30-15    Dr. Royce Newell, rtoin 5 yrs  (polypectomy)    H/O Doppler ultrasound 07/19/2018    Carotid Doppler. Mild (0-49%) disease of bilateral internal carotid arteries, bilateral common carotid artery bulb to proximal ICA exhibits calcific plaque, left distal ICA end diastolic velocity is elevated but ratio is below 2.0, normal vertebral flow.  Hyperlipidemia     Hypertension     Incidental lung nodule 4 mm RUL. f/u in 10/15 4/22/2015    Ct shoulder showed 4 mm nodule in RUL in 4/15,CT chest 9/15 5mm.  f/u in 4/16   [facility] Kidney stone on right side 9/29/2016    Large stone on right side on CT abdomen of 10/16    Nephrolithiasis     Right shoulder pain 3/17/2015    Pt had seen Dr. Nimisha Rachel and had arthrogram and CT shoulder. Showed possible tear of biceps tendon. Conservative treatment,     Squamous papilloma 2/23/15    benign left oropharynx/nasopharynx Dr Bin Asencio abuse disorder 1/20/2015    Pt has quit on 10/20/16     Past Surgical History:   Procedure Laterality Date    CARDIAC SURGERY  10/24/2016     CABG x4 SVG to RCA and Ramus, LIMA to LAD, DIAG sequ    COLONOSCOPY  3/15    Dr. Naga Mckinney. 5yr    HERNIA REPAIR      umbilical hernia    KIDNEY STONE SURGERY       Social History     Tobacco Use    Smoking status: Current Every Day Smoker     Packs/day: 1.00     Years: 1.00     Pack years: 1.00     Types: Cigarettes     Start date: 7/6/2017    Smokeless tobacco: Never Used   Substance Use Topics    Alcohol use:  Yes     Alcohol/week: 6.0 standard drinks     Types: 6 Standard drinks or equivalent per week     Comment: 3 or 4 beers per day, most days       LAB REVIEW:  CBC:   Lab Results   Component Value Date    WBC 9.3 02/15/2019    HGB 16.8 02/15/2019    HCT 50.5 02/15/2019     02/15/2019     Lipids:   Lab Results   Component Value Date    CHOL 115 02/03/2017    TRIG 64 02/03/2017    HDL 53 11/04/2019    LDLCALC 97 11/04/2019    LDLDIRECT 73 11/02/2018    TRIGLYCFAST 68 11/04/2019     Renal:   Lab Results   Component Value Date    BUN 16 11/04/2019    CREATININE 0.8 11/04/2019     11/04/2019    K 4.4 11/04/2019    ALT 38 11/04/2019    AST 24 11/04/2019    GLUCOSE 109 11/04/2019     PT/INR:   Lab Results   Component Value Date    INR 1.07 10/24/2016     A1C:   Lab Results   Component Value Date    LABA1C 6.2 03/05/2019           Leah Zamora MD, 7/22/2020 , 8:25 AM

## 2020-07-23 LAB
ESTIMATED AVERAGE GLUCOSE: 119.8 MG/DL
HBA1C MFR BLD: 5.8 %

## 2020-10-05 RX ORDER — AMLODIPINE BESYLATE 5 MG/1
5 TABLET ORAL DAILY
Qty: 30 TABLET | Refills: 5 | Status: SHIPPED | OUTPATIENT
Start: 2020-10-05 | End: 2021-04-16 | Stop reason: SDUPTHER

## 2020-10-23 ENCOUNTER — OFFICE VISIT (OUTPATIENT)
Dept: INTERNAL MEDICINE CLINIC | Age: 55
End: 2020-10-23
Payer: COMMERCIAL

## 2020-10-23 VITALS
OXYGEN SATURATION: 90 % | TEMPERATURE: 98.2 F | HEIGHT: 69 IN | WEIGHT: 240.4 LBS | SYSTOLIC BLOOD PRESSURE: 120 MMHG | BODY MASS INDEX: 35.6 KG/M2 | DIASTOLIC BLOOD PRESSURE: 80 MMHG | RESPIRATION RATE: 16 BRPM | HEART RATE: 68 BPM

## 2020-10-23 PROBLEM — R60.0 LOCALIZED EDEMA: Status: RESOLVED | Noted: 2019-04-24 | Resolved: 2020-10-23

## 2020-10-23 PROCEDURE — 90732 PPSV23 VACC 2 YRS+ SUBQ/IM: CPT | Performed by: INTERNAL MEDICINE

## 2020-10-23 PROCEDURE — G8417 CALC BMI ABV UP PARAM F/U: HCPCS | Performed by: INTERNAL MEDICINE

## 2020-10-23 PROCEDURE — G8926 SPIRO NO PERF OR DOC: HCPCS | Performed by: INTERNAL MEDICINE

## 2020-10-23 PROCEDURE — 90471 IMMUNIZATION ADMIN: CPT | Performed by: INTERNAL MEDICINE

## 2020-10-23 PROCEDURE — 90472 IMMUNIZATION ADMIN EACH ADD: CPT | Performed by: INTERNAL MEDICINE

## 2020-10-23 PROCEDURE — 99213 OFFICE O/P EST LOW 20 MIN: CPT | Performed by: INTERNAL MEDICINE

## 2020-10-23 PROCEDURE — 3017F COLORECTAL CA SCREEN DOC REV: CPT | Performed by: INTERNAL MEDICINE

## 2020-10-23 PROCEDURE — G8427 DOCREV CUR MEDS BY ELIG CLIN: HCPCS | Performed by: INTERNAL MEDICINE

## 2020-10-23 PROCEDURE — 90686 IIV4 VACC NO PRSV 0.5 ML IM: CPT | Performed by: INTERNAL MEDICINE

## 2020-10-23 PROCEDURE — 4004F PT TOBACCO SCREEN RCVD TLK: CPT | Performed by: INTERNAL MEDICINE

## 2020-10-23 PROCEDURE — G8482 FLU IMMUNIZE ORDER/ADMIN: HCPCS | Performed by: INTERNAL MEDICINE

## 2020-10-23 PROCEDURE — 3023F SPIROM DOC REV: CPT | Performed by: INTERNAL MEDICINE

## 2020-10-23 ASSESSMENT — ENCOUNTER SYMPTOMS
GASTROINTESTINAL NEGATIVE: 1
CONSTIPATION: 0
BLOOD IN STOOL: 0
ALLERGIC/IMMUNOLOGIC NEGATIVE: 1
EYES NEGATIVE: 1
RESPIRATORY NEGATIVE: 1

## 2020-10-23 NOTE — ASSESSMENT & PLAN NOTE
PFTs done 6/2019 : severe obstructive disease. Patient is taking albuterol inhaler and incruse  Feeling well.    Gave coupon for [email or web address]

## 2020-10-23 NOTE — PROGRESS NOTES
Vaccine Information Sheet, \"Influenza - Inactivated\"  given to Demetrio Byrne, or parent/legal guardian of  Demetrio Byrne and verbalized understanding. Patient responses:    Have you ever had a reaction to a flu vaccine? No  Do you have any current illness? No  Have you ever had Guillian Pharr Syndrome? No  Do you have a serious allergy to any of the follow: Neomycin, Polymyxin, Thimerosal, eggs or egg products? No    Flu vaccine given per order. Please see immunization tab. Risks and benefits explained. Current VIS given.       Immunizations Administered     Name Date Dose Route    Influenza, Quadv, IM, PF (6 mo and older Fluzone, Flulaval, Fluarix, and 3 yrs and older Afluria) 10/23/2020 0.5 mL Intramuscular    Site: Deltoid- Left    Lot: [id number]    NDC: 35198-002-54

## 2020-10-23 NOTE — PROGRESS NOTES
Maude Ocampo  Patient's  is 1965  Seen in office on 10/23/2020      SUBJECTIVE:  Elisa stark 60 y. o.year old male presents today   Chief Complaint   Patient presents with    COPD    Discuss Medications    Flu Vaccine     would like vaccine today     Patient is here for follow-up of COPD, hypertension and hyperlipidemia  Patient has hypertension. Taking medications No headaches, no chest pain, no palpitations and no dizziness. Patient has hyperlipidemia. Taking medications. No abdominal pain, no nausea or vomiting. No myalgias. Pt has COPD and is breathing much. Pt states incruse is expensive. No chest pain. No headache. Taking medications regularly. No side effects noted. Review of Systems   Constitutional: Negative. Negative for diaphoresis and fatigue. HENT: Negative. Eyes: Negative. Respiratory: Negative. Cardiovascular: Negative. Gastrointestinal: Negative. Negative for blood in stool and constipation. Endocrine: Negative. Genitourinary: Negative. Musculoskeletal: Negative. Skin: Negative. Allergic/Immunologic: Negative. Neurological: Negative. Hematological: Negative. Psychiatric/Behavioral: Negative. OBJECTIVE: /80   Pulse 68   Temp 98.2 °F (36.8 °C) (Oral)   Resp 16   Ht 5' 9\" (1.753 m)   Wt 240 lb 6.4 oz (109 kg)   SpO2 90%   BMI 35.50 kg/m²     Wt Readings from Last 3 Encounters:   10/23/20 240 lb 6.4 oz (109 kg)   20 245 lb 12.8 oz (111.5 kg)   20 253 lb 3.2 oz (114.9 kg)      GENERAL: - Alert, oriented, pleasant, in no apparent distress. HEENT: - Conjunctiva pink, no scleral icterus. ENT clear. NECK: -Supple. No jugular venous distention noted. No masses felt,  CARDIOVASCULAR: - Normal S1 and S2    PULMONARY: - No respiratory distress. No wheezes or rales. ABDOMEN: - Soft and non-tender,no masses  ororganomegaly. EXTREMITIES: - No cyanosis, clubbing, or significant edema. SKIN: Skin is warm and dry. NEUROLOGICAL: - Cranial nerves II through XII are grossly intact. IMPRESSION:    Encounter Diagnoses   Name Primary?  Essential hypertension     Mixed hyperlipidemia     Tobacco abuse disorder     Incidental lung nodule 4 mm RUL. f/u in 10/15     Kidney stone on right side     Coronary artery disease s/p MI, CABG x 4     Bilateral carotid artery disease, unspecified type ([address]     COPD, severe ([address]     Weight gain        ASSESSMENT/PLAN:    Essential hypertension  Hypertension is in control. Continue lisinopril and Coreg    Mixed hyperlipidemia  Patient has hyperlipidemia. He is on atorvastatin. Last lipid profile was good. Follow low-cholesterol diet    Tobacco abuse disorder  Advised patient to quit smoking. Options given again    Incidental lung nodule 4 mm RUL. f/u in 10/15  Nodule in the lung was benign per CT scan protocol. Kidney stone on right side  Patient denies any renal colic anymore. Coronary artery disease s/p MI, CABG x 4  History of coronary artery disease and CABG. Denies any chest pain. No shortness of breath no edema. He sees cardiologist.  Continue medications including aspirin, statins    Bilateral carotid artery disease ([facility] [address]  Patient has mild carotid artery disease. He is already on statins. COPD, severe ([address]  PFTs done 6/2019 : severe obstructive disease. Patient is taking albuterol inhaler and incruse  Feeling well. Gave coupon for Incruse    Weight gain  Gained 17 lbs earlier   Lost some weight. Will give flu vaccine   Will give pneumovax 23  Pt wants to see Dr Mauricio Fry for repeat colonoscopy. Orders Placed This Encounter   Procedures    Comprehensive Metabolic Panel    Lipid, Fasting    Psa screening         Return to office in 3 months. Mediations reviewed with the patient. Continue current medications. Appropriate prescriptions are addressed. After visit summeryprovided. Follow up as directed sooner if needed.   Questions answered and patient verbalizes understanding. Call for any problems, questions, or concerns. Allergies   Allergen Reactions    Sulfa Antibiotics Rash     Current Outpatient Medications   Medication Sig Dispense Refill    amLODIPine (NORVASC) 5 MG tablet TAKE 1 TABLET BY MOUTH DAILY 30 tablet 5    atorvastatin (LIPITOR) 40 MG tablet TAKE 1 TABLET BY MOUTH DAILY **STOP PRAVASTATIN** 30 tablet 5    carvedilol (COREG) 6.25 MG tablet TAKE 1 TABLET BY MOUTH 2 TIMES DAILY 60 tablet 5    Umeclidinium Bromide (INCRUSE ELLIPTA) 62.5 MCG/INH AEPB Inhale 1 puff into the lungs daily 1 each 5    lisinopril (PRINIVIL;ZESTRIL) 5 MG tablet Take 1 tablet by mouth daily 30 tablet 5    aspirin 81 MG EC tablet Take 1 tablet by mouth daily 30 tablet 5    albuterol sulfate HFA (PROVENTIL HFA) 108 (90 Base) MCG/ACT inhaler Inhale 2 puffs into the lungs every 4 hours as needed for Wheezing or Shortness of Breath With spacer (and mask if indicated). Thanks. 1 Inhaler 1     No current facility-administered medications for this visit. Past Medical History:   Diagnosis Date    Acute gout of left ankle 2/2/2017    Resolved. Uric acid improved after d/c HCTZ    COPD, severe ([address] 8/9/2019    PFTs done 6/2019 : severe obstructive disease.  Coronary artery disease involving native coronary artery of native heart without angina pectoris 11/04/2016    CABG x 4 on 9/24/16: Dr Bon Zelaya H/O colonoscopy 3-30-15    Dr. Allan Melendez, rtoin 5 yrs  (polypectomy)    H/O Doppler ultrasound 07/19/2018    Carotid Doppler. Mild (0-49%) disease of bilateral internal carotid arteries, bilateral common carotid artery bulb to proximal ICA exhibits calcific plaque, left distal ICA end diastolic velocity is elevated but ratio is below 2.0, normal vertebral flow.  Hyperlipidemia     Hypertension     Incidental lung nodule 4 mm RUL. f/u in 10/15 4/22/2015    Ct shoulder showed 4 mm nodule in RUL in 4/15,CT chest 9/15 5mm.  f/u in 4/16    Kidney stone on right side 9/29/2016    Large stone on right side on CT abdomen of 10/16    Nephrolithiasis     Right shoulder pain 3/17/2015    Pt had seen Dr. Lakhwinder Dozier and had arthrogram and CT shoulder. Showed possible tear of biceps tendon. Conservative treatment,     Squamous papilloma 2/23/15    benign left oropharynx/nasopharynx Dr Nikita Camp abuse disorder 1/20/2015    Pt has quit on 10/20/16     Past Surgical History:   Procedure Laterality Date    CARDIAC SURGERY  10/24/2016     CABG x4 SVG to RCA and Ramus, LIMA to LAD, DIAG sequ    COLONOSCOPY  3/15    Dr. Rajni Lane. 5yr    HERNIA REPAIR      umbilical hernia    KIDNEY STONE SURGERY       Social History     Tobacco Use    Smoking status: Current Every Day Smoker     Packs/day: 1.00     Years: 1.00     Pack years: 1.00     Types: Cigarettes     Start date: 7/6/2017    Smokeless tobacco: Never Used   Substance Use Topics    Alcohol use:  Yes     Alcohol/week: 6.0 standard drinks     Types: 6 Standard drinks or equivalent per week     Comment: 3 or 4 beers per day, most days       LAB REVIEW:  CBC:   Lab Results   Component Value Date    WBC 7.8 07/22/2020    HGB 17.6 07/22/2020    HCT 52.8 07/22/2020     07/22/2020     Lipids:   Lab Results   Component Value Date    CHOL 115 02/03/2017    TRIG 64 02/03/2017    HDL 38 (L) 07/22/2020    LDLCALC 67 07/22/2020    LDLDIRECT 73 11/02/2018    TRIGLYCFAST 68 07/22/2020     Renal:   Lab Results   Component Value Date    BUN 16 07/22/2020    CREATININE 0.7 07/22/2020     07/22/2020    K 4.8 07/22/2020    ALT 16 07/22/2020    AST 16 07/22/2020    GLUCOSE 121 07/22/2020     PT/INR:   Lab Results   Component Value Date    INR 1.07 10/24/2016     A1C:   Lab Results   Component Value Date    LABA1C 5.8 07/22/2020           Christiano Novoa MD, 10/23/2020 , 8:35 AM

## 2020-10-23 NOTE — ASSESSMENT & PLAN NOTE
CT abdomen 4/2018 showed 3.2 cm AAA. It was normal on CT abdomen in 10/2016  4/8/2019 US : 4.5 x 4.3 cm size : increased significantly. Vascular consultation and recheck in one year. CT abdomen in 5/2019 : 3.3 cm : seen Dr Portillo Hathaway.  Recheck in one year   It was already ordered by Dr Sampson Been to be done in 5/2020, but pt did not get it done  Advised pt to get it done

## 2020-10-23 NOTE — ASSESSMENT & PLAN NOTE
Patient has hyperlipidemia. He is on atorvastatin. Last lipid profile was good.   Follow low-cholesterol diet

## 2020-10-26 RX ORDER — LISINOPRIL 5 MG/1
5 TABLET ORAL DAILY
Qty: 30 TABLET | Refills: 5 | Status: SHIPPED | OUTPATIENT
Start: 2020-10-26 | End: 2021-03-25 | Stop reason: ALTCHOICE

## 2020-10-28 ENCOUNTER — HOSPITAL ENCOUNTER (OUTPATIENT)
Dept: ULTRASOUND IMAGING | Age: 55
Discharge: HOME | End: 2020-10-28
Payer: COMMERCIAL

## 2020-10-28 PROCEDURE — 93978 VASCULAR STUDY: CPT

## 2020-12-11 RX ORDER — CARVEDILOL 6.25 MG/1
6.25 TABLET ORAL 2 TIMES DAILY
Qty: 60 TABLET | Refills: 5 | Status: SHIPPED | OUTPATIENT
Start: 2020-12-11 | End: 2021-04-16 | Stop reason: SDUPTHER

## 2020-12-24 NOTE — TELEPHONE ENCOUNTER
Patient is questioning which antibiotic to take. She had been started on Zithromaxx, that is now discontinued and she is started on Keflex. Instructed to take the Keflex 4 times per day, use the Flovent, Prednisone and the Tessalon perles.   She agrees.        RTO Aug. 3

## 2021-01-14 RX ORDER — ALBUTEROL SULFATE 90 UG/1
AEROSOL, METERED RESPIRATORY (INHALATION)
Qty: 18 G | Refills: 1 | Status: SHIPPED | OUTPATIENT
Start: 2021-01-14 | End: 2021-10-15 | Stop reason: SDUPTHER

## 2021-01-22 ENCOUNTER — OFFICE VISIT (OUTPATIENT)
Dept: INTERNAL MEDICINE CLINIC | Age: 56
End: 2021-01-22
Payer: COMMERCIAL

## 2021-01-22 VITALS
RESPIRATION RATE: 16 BRPM | HEART RATE: 64 BPM | DIASTOLIC BLOOD PRESSURE: 82 MMHG | SYSTOLIC BLOOD PRESSURE: 124 MMHG | BODY MASS INDEX: 36.03 KG/M2 | OXYGEN SATURATION: 92 % | WEIGHT: 244 LBS | TEMPERATURE: 98.2 F

## 2021-01-22 DIAGNOSIS — Z12.5 SCREENING PSA (PROSTATE SPECIFIC ANTIGEN): ICD-10-CM

## 2021-01-22 DIAGNOSIS — I25.10 CORONARY ARTERY DISEASE INVOLVING NATIVE CORONARY ARTERY OF NATIVE HEART WITHOUT ANGINA PECTORIS: ICD-10-CM

## 2021-01-22 DIAGNOSIS — R35.0 URINARY FREQUENCY: Primary | ICD-10-CM

## 2021-01-22 DIAGNOSIS — J44.9 COPD, SEVERE (HCC): ICD-10-CM

## 2021-01-22 DIAGNOSIS — E78.2 MIXED HYPERLIPIDEMIA: ICD-10-CM

## 2021-01-22 DIAGNOSIS — Z12.11 SCREENING FOR COLON CANCER: ICD-10-CM

## 2021-01-22 DIAGNOSIS — I71.40 ABDOMINAL AORTIC ANEURYSM (AAA) WITHOUT RUPTURE: ICD-10-CM

## 2021-01-22 DIAGNOSIS — R29.818 SUSPECTED SLEEP APNEA: ICD-10-CM

## 2021-01-22 DIAGNOSIS — I77.9 BILATERAL CAROTID ARTERY DISEASE, UNSPECIFIED TYPE (HCC): ICD-10-CM

## 2021-01-22 DIAGNOSIS — Z72.0 TOBACCO ABUSE DISORDER: ICD-10-CM

## 2021-01-22 DIAGNOSIS — I10 ESSENTIAL HYPERTENSION: ICD-10-CM

## 2021-01-22 LAB
A/G RATIO: 2 (ref 1.1–2.2)
ALBUMIN SERPL-MCNC: 4.3 G/DL (ref 3.4–5)
ALP BLD-CCNC: 77 U/L (ref 40–129)
ALT SERPL-CCNC: 16 U/L (ref 10–40)
ANION GAP SERPL CALCULATED.3IONS-SCNC: 12 MMOL/L (ref 3–16)
AST SERPL-CCNC: 17 U/L (ref 15–37)
BILIRUB SERPL-MCNC: 0.4 MG/DL (ref 0–1)
BILIRUBIN, POC: ABNORMAL
BLOOD URINE, POC: ABNORMAL
BUN BLDV-MCNC: 12 MG/DL (ref 7–20)
CALCIUM SERPL-MCNC: 9.5 MG/DL (ref 8.3–10.6)
CHLORIDE BLD-SCNC: 101 MMOL/L (ref 99–110)
CHOLESTEROL, FASTING: 142 MG/DL (ref 0–199)
CLARITY, POC: CLEAR
CO2: 28 MMOL/L (ref 21–32)
COLOR, POC: YELLOW
CREAT SERPL-MCNC: 0.8 MG/DL (ref 0.9–1.3)
GFR AFRICAN AMERICAN: >60
GFR NON-AFRICAN AMERICAN: >60
GLOBULIN: 2.1 G/DL
GLUCOSE BLD-MCNC: 125 MG/DL (ref 70–99)
GLUCOSE URINE, POC: NEGATIVE
HDLC SERPL-MCNC: 45 MG/DL (ref 40–60)
KETONES, POC: NEGATIVE
LDL CHOLESTEROL CALCULATED: 77 MG/DL
LEUKOCYTE EST, POC: NEGATIVE
NITRITE, POC: NEGATIVE
PH, POC: 5.5
POTASSIUM SERPL-SCNC: 4.3 MMOL/L (ref 3.5–5.1)
PROSTATE SPECIFIC ANTIGEN: 0.51 NG/ML (ref 0–4)
PROTEIN, POC: ABNORMAL
SODIUM BLD-SCNC: 141 MMOL/L (ref 136–145)
SPECIFIC GRAVITY, POC: >=1.03
TOTAL PROTEIN: 6.4 G/DL (ref 6.4–8.2)
TRIGLYCERIDE, FASTING: 98 MG/DL (ref 0–150)
UROBILINOGEN, POC: 0.2
VLDLC SERPL CALC-MCNC: 20 MG/DL

## 2021-01-22 PROCEDURE — 3017F COLORECTAL CA SCREEN DOC REV: CPT | Performed by: INTERNAL MEDICINE

## 2021-01-22 PROCEDURE — 36415 COLL VENOUS BLD VENIPUNCTURE: CPT | Performed by: INTERNAL MEDICINE

## 2021-01-22 PROCEDURE — G8417 CALC BMI ABV UP PARAM F/U: HCPCS | Performed by: INTERNAL MEDICINE

## 2021-01-22 PROCEDURE — 99213 OFFICE O/P EST LOW 20 MIN: CPT | Performed by: INTERNAL MEDICINE

## 2021-01-22 PROCEDURE — G8427 DOCREV CUR MEDS BY ELIG CLIN: HCPCS | Performed by: INTERNAL MEDICINE

## 2021-01-22 PROCEDURE — G8926 SPIRO NO PERF OR DOC: HCPCS | Performed by: INTERNAL MEDICINE

## 2021-01-22 PROCEDURE — G8482 FLU IMMUNIZE ORDER/ADMIN: HCPCS | Performed by: INTERNAL MEDICINE

## 2021-01-22 PROCEDURE — 81002 URINALYSIS NONAUTO W/O SCOPE: CPT | Performed by: INTERNAL MEDICINE

## 2021-01-22 PROCEDURE — 3023F SPIROM DOC REV: CPT | Performed by: INTERNAL MEDICINE

## 2021-01-22 PROCEDURE — 4004F PT TOBACCO SCREEN RCVD TLK: CPT | Performed by: INTERNAL MEDICINE

## 2021-01-22 RX ORDER — ATORVASTATIN CALCIUM 40 MG/1
TABLET, FILM COATED ORAL
Qty: 30 TABLET | Refills: 5 | Status: CANCELLED | OUTPATIENT
Start: 2021-01-22

## 2021-01-22 RX ORDER — ATORVASTATIN CALCIUM 40 MG/1
TABLET, FILM COATED ORAL
Qty: 30 TABLET | Refills: 5 | Status: SHIPPED | OUTPATIENT
Start: 2021-01-22 | End: 2021-10-15 | Stop reason: SDUPTHER

## 2021-01-22 ASSESSMENT — PATIENT HEALTH QUESTIONNAIRE - PHQ9
SUM OF ALL RESPONSES TO PHQ QUESTIONS 1-9: 1
SUM OF ALL RESPONSES TO PHQ9 QUESTIONS 1 & 2: 1

## 2021-01-22 ASSESSMENT — ENCOUNTER SYMPTOMS
EYES NEGATIVE: 1
WHEEZING: 0
COUGH: 0
SHORTNESS OF BREATH: 0
RESPIRATORY NEGATIVE: 1
ALLERGIC/IMMUNOLOGIC NEGATIVE: 1
GASTROINTESTINAL NEGATIVE: 1

## 2021-01-22 NOTE — PROGRESS NOTES
Blakeoge Query  Patient's  is 1965  Seen in office on 2021      SUBJECTIVE:  Cassandra stark 60 y. o.year old male presents today   Chief Complaint   Patient presents with    Hypertension    Medication Refill    Urinary Frequency       Taking medications regularly. No side effects noted. Review of Systems   Constitutional: Negative. HENT: Negative. Eyes: Negative. Respiratory: Negative. Negative for cough, shortness of breath and wheezing. Cardiovascular: Negative. Negative for chest pain, palpitations and leg swelling. Gastrointestinal: Negative. Endocrine: Negative. Genitourinary: Positive for frequency and urgency. Negative for dysuria, enuresis and flank pain. Musculoskeletal: Negative. Allergic/Immunologic: Negative. Neurological: Negative. Hematological: Negative. Psychiatric/Behavioral: Negative. OBJECTIVE: /82   Pulse 64   Temp 98.2 °F (36.8 °C) (Oral)   Resp 16   Wt 244 lb (110.7 kg)   SpO2 92%   BMI 36.03 kg/m²     Wt Readings from Last 3 Encounters:   21 244 lb (110.7 kg)   10/23/20 240 lb 6.4 oz (109 kg)   20 245 lb 12.8 oz (111.5 kg)      GENERAL:  Alert, oriented, pleasant, in no apparent distress. HEENT:  Conjunctiva pink, no scleral icterus. ENT clear. NECK: Supple. No jugular venous distention noted. No masses felt,  CARDIOVASCULAR:  Normal S1 and S2    PULMONARY:  No respiratory distress. No wheezes or rales. ABDOMEN:  Soft and non-tender,no masses  ororganomegaly. EXTREMITIES:  No cyanosis, clubbing, or significant edema. SKIN: Skin is warm and dry. NEUROLOGICAL:  Cranial nerves II through XII are grossly intact. IMPRESSION:    Encounter Diagnoses   Name Primary?     Urinary frequency Yes    Essential hypertension     Coronary artery disease s/p MI, CABG x 4     Mixed hyperlipidemia     Suspected sleep apnea     Tobacco abuse disorder  Abdominal aortic aneurysm (AAA) without rupture (HCC)     Bilateral carotid artery disease, unspecified type (HCC)     COPD, severe ([address]        ASSESSMENT/PLAN:    Refer for colonosocopy  Prefers Dr Zenon Aleman ordered     Mediations reviewed with the patient. Continue current medications. Appropriate prescriptions are addressed. After visit summeryprovided. Follow up as directed sooner if needed. Questions answered and patient verbalizes understanding. Call for any problems, questions, or concerns. Allergies   Allergen Reactions    Sulfa Antibiotics Rash     Current Outpatient Medications   Medication Sig Dispense Refill    albuterol sulfate  (90 Base) MCG/ACT inhaler INHALE 2 PUFFS INTO THE LUNGS EVERY 4 HOURS AS NEEDED FOR WHEEZING OR SHORTNESS OF BREATH 18 g 1    carvedilol (COREG) 6.25 MG tablet TAKE 1 TABLET BY MOUTH 2 TIMES DAILY 60 tablet 5    lisinopril (PRINIVIL;ZESTRIL) 5 MG tablet TAKE 1 TABLET BY MOUTH DAILY 30 tablet 5    amLODIPine (NORVASC) 5 MG tablet TAKE 1 TABLET BY MOUTH DAILY 30 tablet 5    atorvastatin (LIPITOR) 40 MG tablet TAKE 1 TABLET BY MOUTH DAILY **STOP PRAVASTATIN** 30 tablet 5    aspirin 81 MG EC tablet Take 1 tablet by mouth daily 30 tablet 5    Umeclidinium Bromide (INCRUSE ELLIPTA) 62.5 MCG/INH AEPB Inhale 1 puff into the lungs daily (Patient not taking: Reported on 1/22/2021) 1 each 5     No current facility-administered medications for this visit. Past Medical History:   Diagnosis Date    Acute gout of left ankle 2/2/2017    Resolved. Uric acid improved after d/c HCTZ    COPD, severe ([facility] [address] 8/9/2019    PFTs done 6/2019 : severe obstructive disease.      Coronary artery disease involving native coronary artery of native heart without angina pectoris 11/04/2016    CABG x 4 on 9/24/16: Dr Oni Uriarte H/O colonoscopy 3-30-15    Dr. Soco Ma, rtoin 5 yrs  (polypectomy)    H/O Doppler ultrasound 07/19/2018 Carotid Doppler. Mild (0-49%) disease of bilateral internal carotid arteries, bilateral common carotid artery bulb to proximal ICA exhibits calcific plaque, left distal ICA end diastolic velocity is elevated but ratio is below 2.0, normal vertebral flow.  Hyperlipidemia     Hypertension     Incidental lung nodule 4 mm RUL. f/u in 10/15 4/22/2015    Ct shoulder showed 4 mm nodule in RUL in 4/15,CT chest 9/15 5mm. f/u in 4/16    Kidney stone on right side 9/29/2016    Large stone on right side on CT abdomen of 10/16    Nephrolithiasis     Right shoulder pain 3/17/2015    Pt had seen Dr. Adelia Morocho and had arthrogram and CT shoulder. Showed possible tear of biceps tendon. Conservative treatment,     Squamous papilloma 2/23/15    benign left oropharynx/nasopharynx Dr Irma Urbina abuse disorder 1/20/2015    Pt has quit on 10/20/16     Past Surgical History:   Procedure Laterality Date    CARDIAC SURGERY  10/24/2016     CABG x4 SVG to RCA and Ramus, LIMA to LAD, DIAG sequ    COLONOSCOPY  3/15    Dr. Kaylynn Riley. 5yr    HERNIA REPAIR      umbilical hernia    KIDNEY STONE SURGERY       Social History     Tobacco Use    Smoking status: Current Every Day Smoker     Packs/day: 1.00     Years: 1.00     Pack years: 1.00     Types: Cigarettes     Start date: 7/6/2017    Smokeless tobacco: Never Used   Substance Use Topics    Alcohol use:  Yes     Alcohol/week: 6.0 standard drinks     Types: 6 Standard drinks or equivalent per week     Comment: 3 or 4 beers per day, most days       LAB REVIEW:  CBC:   Lab Results   Component Value Date    WBC 7.8 07/22/2020    HGB 17.6 07/22/2020    HCT 52.8 07/22/2020     07/22/2020     Lipids:   Lab Results   Component Value Date    CHOL 115 02/03/2017    TRIG 64 02/03/2017    HDL 38 (L) 07/22/2020    LDLCALC 67 07/22/2020    LDLDIRECT 73 11/02/2018    TRIGLYCFAST 68 07/22/2020     Renal:   Lab Results   Component Value Date    BUN 16 07/22/2020 CREATININE 0.7 07/22/2020     07/22/2020    K 4.8 07/22/2020    ALT 16 07/22/2020    AST 16 07/22/2020    GLUCOSE 121 07/22/2020     PT/INR:   Lab Results   Component Value Date    INR 1.07 10/24/2016     A1C:   Lab Results   Component Value Date    LABA1C 5.8 07/22/2020           Jacqueline Zuniga MD, 1/22/2021 , 8:36 AM

## 2021-02-10 ENCOUNTER — OFFICE VISIT (OUTPATIENT)
Dept: INTERNAL MEDICINE CLINIC | Age: 56
End: 2021-02-10
Payer: COMMERCIAL

## 2021-02-10 VITALS
WEIGHT: 237.4 LBS | DIASTOLIC BLOOD PRESSURE: 76 MMHG | RESPIRATION RATE: 16 BRPM | OXYGEN SATURATION: 93 % | BODY MASS INDEX: 35.06 KG/M2 | HEART RATE: 76 BPM | SYSTOLIC BLOOD PRESSURE: 122 MMHG | TEMPERATURE: 97.9 F

## 2021-02-10 DIAGNOSIS — R10.33 PERIUMBILICAL ABDOMINAL PAIN: Primary | ICD-10-CM

## 2021-02-10 DIAGNOSIS — I71.40 ABDOMINAL AORTIC ANEURYSM (AAA) WITHOUT RUPTURE: ICD-10-CM

## 2021-02-10 DIAGNOSIS — I10 ESSENTIAL HYPERTENSION: ICD-10-CM

## 2021-02-10 DIAGNOSIS — J44.9 COPD, SEVERE (HCC): ICD-10-CM

## 2021-02-10 DIAGNOSIS — R35.0 URINARY FREQUENCY: ICD-10-CM

## 2021-02-10 DIAGNOSIS — Z72.0 TOBACCO ABUSE DISORDER: ICD-10-CM

## 2021-02-10 DIAGNOSIS — N20.0 KIDNEY STONE ON RIGHT SIDE: ICD-10-CM

## 2021-02-10 DIAGNOSIS — I25.10 CORONARY ARTERY DISEASE INVOLVING NATIVE CORONARY ARTERY OF NATIVE HEART WITHOUT ANGINA PECTORIS: ICD-10-CM

## 2021-02-10 PROCEDURE — G8482 FLU IMMUNIZE ORDER/ADMIN: HCPCS | Performed by: INTERNAL MEDICINE

## 2021-02-10 PROCEDURE — G8427 DOCREV CUR MEDS BY ELIG CLIN: HCPCS | Performed by: INTERNAL MEDICINE

## 2021-02-10 PROCEDURE — G8926 SPIRO NO PERF OR DOC: HCPCS | Performed by: INTERNAL MEDICINE

## 2021-02-10 PROCEDURE — 99214 OFFICE O/P EST MOD 30 MIN: CPT | Performed by: INTERNAL MEDICINE

## 2021-02-10 PROCEDURE — 3017F COLORECTAL CA SCREEN DOC REV: CPT | Performed by: INTERNAL MEDICINE

## 2021-02-10 PROCEDURE — 3023F SPIROM DOC REV: CPT | Performed by: INTERNAL MEDICINE

## 2021-02-10 PROCEDURE — 4004F PT TOBACCO SCREEN RCVD TLK: CPT | Performed by: INTERNAL MEDICINE

## 2021-02-10 PROCEDURE — G8417 CALC BMI ABV UP PARAM F/U: HCPCS | Performed by: INTERNAL MEDICINE

## 2021-02-10 ASSESSMENT — ENCOUNTER SYMPTOMS
ANAL BLEEDING: 0
COUGH: 0
WHEEZING: 0
ABDOMINAL PAIN: 0
NAUSEA: 0
VOMITING: 0
SHORTNESS OF BREATH: 1
BLOOD IN STOOL: 0
EYES NEGATIVE: 1

## 2021-02-10 NOTE — PROGRESS NOTES
Brenda Levine  Patient's  is 1965  Seen in office on 2/10/2021      SUBJECTIVE:  Elspeth Setting mi 60 y. o.year old male presents today   Chief Complaint   Patient presents with    Other     still feeling like he is not empty bladder     Pt c/o abdominal discomfort around umbilicus. No fever or chills. Bowel movements are normal.  No nausea or vomiting. No fever or chills. Feels has not emptied bladder. No hematuria  No NVD  No blood in the stools. Patient has an appointment with Dr. Aroldo Petersen for colonoscopy  Patient has a history of kidney stones in the past but denies any hematuria    Patient is still a smoker and wants to quit smoking. He does get short of breath sometimes. No cough or sputum production. Taking medications regularly. No side effects noted. Review of Systems   Constitutional: Negative. Negative for chills, diaphoresis and fever. HENT: Negative. Eyes: Negative. Respiratory: Positive for shortness of breath. Negative for cough and wheezing. Cardiovascular: Negative. Negative for chest pain, palpitations and leg swelling. Gastrointestinal: Negative for abdominal pain, anal bleeding, blood in stool, nausea and vomiting. Genitourinary: Positive for urgency. Negative for difficulty urinating, dysuria, flank pain and frequency. Musculoskeletal: Negative. Skin: Negative. Neurological: Negative. Hematological: Negative. Psychiatric/Behavioral: Negative. OBJECTIVE: /76   Pulse 76   Temp 97.9 °F (36.6 °C) (Oral)   Resp 16   Wt 237 lb 6.4 oz (107.7 kg)   SpO2 93%   BMI 35.06 kg/m²     Wt Readings from Last 3 Encounters:   02/10/21 237 lb 6.4 oz (107.7 kg)   21 244 lb (110.7 kg)   10/23/20 240 lb 6.4 oz (109 kg)     Patient was seen taking COVID-19 precautions. Face mask, gloves and eyes protectors were used. Patient also wore facemask. GENERAL:  Alert, oriented, pleasant, in no apparent distress. HEENT:  Conjunctiva pink, no scleral icterus. ENT clear. NECK: Supple. No jugular venous distention noted. No masses felt,  CARDIOVASCULAR:  Normal S1 and S2    PULMONARY:  No respiratory distress. No wheezes or rales. ABDOMEN:  Soft and non-tender,no masses  ororganomegaly. Abdomen is distended. Bowel sounds are present  EXTREMITIES:  No cyanosis, clubbing, or significant edema. SKIN: Skin is warm and dry. NEUROLOGICAL:  Cranial nerves II through XII are grossly intact. IMPRESSION:    Encounter Diagnoses   Name Primary?  Periumbilical abdominal pain Yes    Abdominal aortic aneurysm (AAA) without rupture (HCC)     COPD, severe (HCC)     Coronary artery disease s/p MI, CABG x 4     Essential hypertension     Kidney stone on right side     Tobacco abuse disorder     Urinary frequency        ASSESSMENT/PLAN:    .  Patient is having abdominal pain described as discomfort around the umbilicus and also on the left side of the abdomen. Will get CT scan of the abdomen and labs will be drawn. .  Patient to see GI. He has an appointment with Dr. Palomares Has. .  Patient has severe COPD. Taking albuterol inhaler. Has not taken Incruse. We will try again. .  Coronary artery disease stable no angina. Continue current medications  . Hyperlipidemia. Continue atorvastatin 40 mg daily. Troy Angry History of kidney stone. .  Urinary frequency and feeling of not emptying bladder. Will refer patient to Dr. Amara Stvoer. Will also get CT scan of the abdomen  . Tobacco abuse. Patient was counseled to quit smoking. Referred patient to [facility]. Discussed in detail. Keep follow-up appointment        Mediations reviewed with the patient. Continue current medications. Appropriate prescriptions are addressed. After visit summeryprovided. Follow up as directed sooner if needed. Questions answered and patient verbalizes understanding. Call for any problems, questions, or concerns.        Allergies Allergen Reactions    Sulfa Antibiotics Rash     Current Outpatient Medications   Medication Sig Dispense Refill    atorvastatin (LIPITOR) 40 MG tablet TAKE 1 TABLET BY MOUTH DAILY **STOP PRAVASTATIN** 30 tablet 5    albuterol sulfate  (90 Base) MCG/ACT inhaler INHALE 2 PUFFS INTO THE LUNGS EVERY 4 HOURS AS NEEDED FOR WHEEZING OR SHORTNESS OF BREATH 18 g 1    carvedilol (COREG) 6.25 MG tablet TAKE 1 TABLET BY MOUTH 2 TIMES DAILY 60 tablet 5    lisinopril (PRINIVIL;ZESTRIL) 5 MG tablet TAKE 1 TABLET BY MOUTH DAILY 30 tablet 5    amLODIPine (NORVASC) 5 MG tablet TAKE 1 TABLET BY MOUTH DAILY 30 tablet 5    aspirin 81 MG EC tablet Take 1 tablet by mouth daily 30 tablet 5    Umeclidinium Bromide (INCRUSE ELLIPTA) 62.5 MCG/INH AEPB Inhale 1 puff into the lungs daily (Patient not taking: Reported on 1/22/2021) 1 each 5     No current facility-administered medications for this visit. Past Medical History:   Diagnosis Date    Abdominal aortic aneurysm (AAA) without rupture ([facility] [address] 4/18/2018    CT abdomen 4/2018 showed 3.2 cm AAA. It was normal on CT abdomen in 10/2016 4/8/2019 US : 4.5 x 4.3 cm size : increased significantly. Vascular consultation and recheck in one year. CT abdomen in 5/2019 : 3.3 cm : seen Dr Jeison Inman. Recheck in one year     Acute gout of left ankle 2/2/2017    Resolved. Uric acid improved after d/c HCTZ    Bilateral carotid artery disease ([facility] [address] 11/4/2016    10/2016: Left ICA 50-69% and right ICA less than 50%  Dr Rosendo Edward following that. Recheck in 6/17 per pt. But pt states it was cancelled. Pt will call and set appt Carotid doppler : 7/2018 :  bilateral 0-49 %    COPD, severe ([facility] [address] 8/9/2019    PFTs done 6/2019 : severe obstructive disease.      Coronary artery disease involving native coronary artery of native heart without angina pectoris 11/04/2016    CABG x 4 on 9/24/16: Dr Judy Sorensen H/O colonoscopy 3-30-15    Dr. Georgeann Aase, [address] 5 yrs  (polypectomy)  H/O Doppler ultrasound 07/19/2018    Carotid Doppler. Mild (0-49%) disease of bilateral internal carotid arteries, bilateral common carotid artery bulb to proximal ICA exhibits calcific plaque, left distal ICA end diastolic velocity is elevated but ratio is below 2.0, normal vertebral flow.  Hyperlipidemia     Hypertension     Incidental lung nodule 4 mm RUL. f/u in 10/15 4/22/2015    Ct shoulder showed 4 mm nodule in RUL in 4/15,CT chest 9/15 5mm. f/u in 4/16    Kidney stone on right side 9/29/2016    Large stone on right side on CT abdomen of 10/16    Nephrolithiasis     Right shoulder pain 3/17/2015    Pt had seen Dr. Kortney Martinez and had arthrogram and CT shoulder. Showed possible tear of biceps tendon. Conservative treatment,     Squamous papilloma 2/23/15    benign left oropharynx/nasopharynx Dr Clem Peralta abuse disorder 1/20/2015    Pt has quit on 10/20/16     Past Surgical History:   Procedure Laterality Date    CARDIAC SURGERY  10/24/2016     CABG x4 SVG to RCA and Ramus, LIMA to LAD, DIAG sequ    COLONOSCOPY  3/15    Dr. Marga Lin. 5yr    HERNIA REPAIR      umbilical hernia    KIDNEY STONE SURGERY       Social History     Tobacco Use    Smoking status: Current Every Day Smoker     Packs/day: 1.00     Years: 1.00     Pack years: 1.00     Types: Cigarettes     Start date: 7/6/2017    Smokeless tobacco: Never Used   Substance Use Topics    Alcohol use:  Yes     Alcohol/week: 6.0 standard drinks     Types: 6 Standard drinks or equivalent per week     Comment: 3 or 4 beers per day, most days       LAB REVIEW:  CBC:   Lab Results   Component Value Date    WBC 7.8 07/22/2020    HGB 17.6 07/22/2020    HCT 52.8 07/22/2020     07/22/2020     Lipids:   Lab Results   Component Value Date    HDL 45 01/22/2021    LDLCALC 77 01/22/2021    LDLDIRECT 73 11/02/2018    TRIGLYCFAST 98 01/22/2021    CHOLFAST 142 01/22/2021     Renal:   Lab Results   Component Value Date    BUN 12 01/22/2021 CREATININE 0.8 01/22/2021     01/22/2021    K 4.3 01/22/2021    ALT 16 01/22/2021    AST 17 01/22/2021    GLUCOSE 125 01/22/2021    GLUF 117 02/01/2018     PT/INR:   Lab Results   Component Value Date    INR 1.07 10/24/2016     A1C:   Lab Results   Component Value Date    LABA1C 5.8 07/22/2020           Melyssa Crespo MD, 2/10/2021 , 10:03 AM

## 2021-02-11 LAB — URINE CULTURE, ROUTINE: NORMAL

## 2021-02-13 ENCOUNTER — HOSPITAL ENCOUNTER (OUTPATIENT)
Dept: CT IMAGING | Age: 56
Discharge: HOME | End: 2021-02-13
Payer: COMMERCIAL

## 2021-02-13 DIAGNOSIS — R10.33 PERIUMBILICAL ABDOMINAL PAIN: ICD-10-CM

## 2021-02-13 PROCEDURE — 74176 CT ABD & PELVIS W/O CONTRAST: CPT

## 2021-03-25 ENCOUNTER — OFFICE VISIT (OUTPATIENT)
Dept: INTERNAL MEDICINE CLINIC | Age: 56
End: 2021-03-25
Payer: COMMERCIAL

## 2021-03-25 VITALS
OXYGEN SATURATION: 94 % | HEART RATE: 75 BPM | SYSTOLIC BLOOD PRESSURE: 124 MMHG | DIASTOLIC BLOOD PRESSURE: 78 MMHG | RESPIRATION RATE: 16 BRPM | TEMPERATURE: 96.8 F

## 2021-03-25 DIAGNOSIS — Z94.5 S/P SPLIT THICKNESS SKIN GRAFT: ICD-10-CM

## 2021-03-25 DIAGNOSIS — S98.131A: Primary | ICD-10-CM

## 2021-03-25 PROCEDURE — G8417 CALC BMI ABV UP PARAM F/U: HCPCS | Performed by: NURSE PRACTITIONER

## 2021-03-25 PROCEDURE — 99214 OFFICE O/P EST MOD 30 MIN: CPT | Performed by: NURSE PRACTITIONER

## 2021-03-25 PROCEDURE — 4004F PT TOBACCO SCREEN RCVD TLK: CPT | Performed by: NURSE PRACTITIONER

## 2021-03-25 PROCEDURE — 10060 I&D ABSCESS SIMPLE/SINGLE: CPT | Performed by: NURSE PRACTITIONER

## 2021-03-25 PROCEDURE — G8482 FLU IMMUNIZE ORDER/ADMIN: HCPCS | Performed by: NURSE PRACTITIONER

## 2021-03-25 PROCEDURE — G8427 DOCREV CUR MEDS BY ELIG CLIN: HCPCS | Performed by: NURSE PRACTITIONER

## 2021-03-25 PROCEDURE — 3017F COLORECTAL CA SCREEN DOC REV: CPT | Performed by: NURSE PRACTITIONER

## 2021-03-25 RX ORDER — BUDESONIDE AND FORMOTEROL FUMARATE DIHYDRATE 160; 4.5 UG/1; UG/1
2 AEROSOL RESPIRATORY (INHALATION) 2 TIMES DAILY
COMMUNITY
Start: 2021-03-08 | End: 2021-04-16

## 2021-03-25 RX ORDER — TAMSULOSIN HYDROCHLORIDE 0.4 MG/1
0.4 CAPSULE ORAL DAILY
COMMUNITY
End: 2021-12-13 | Stop reason: SDUPTHER

## 2021-03-25 RX ORDER — GABAPENTIN 300 MG/1
300 CAPSULE ORAL
COMMUNITY
Start: 2021-03-08 | End: 2021-03-25 | Stop reason: SDUPTHER

## 2021-03-25 RX ORDER — GABAPENTIN 300 MG/1
300 CAPSULE ORAL 2 TIMES DAILY
Qty: 60 CAPSULE | Refills: 0 | Status: SHIPPED | OUTPATIENT
Start: 2021-03-25 | End: 2021-07-16 | Stop reason: ALTCHOICE

## 2021-03-25 RX ORDER — AMLODIPINE BESYLATE 5 MG/1
5 TABLET ORAL DAILY
COMMUNITY
End: 2021-03-25

## 2021-03-25 RX ORDER — FLUTICASONE FUROATE, UMECLIDINIUM BROMIDE AND VILANTEROL TRIFENATATE 100; 62.5; 25 UG/1; UG/1; UG/1
1 POWDER RESPIRATORY (INHALATION) DAILY
Qty: 1 EACH | Refills: 5 | Status: SHIPPED | OUTPATIENT
Start: 2021-03-25 | End: 2021-04-16 | Stop reason: SDUPTHER

## 2021-03-25 ASSESSMENT — ENCOUNTER SYMPTOMS
RESPIRATORY NEGATIVE: 1
EYES NEGATIVE: 1
GASTROINTESTINAL NEGATIVE: 1

## 2021-03-25 NOTE — PROGRESS NOTES
MCG/INH AEPB Inhale 1 puff into the lungs daily 1 each 5    aspirin 81 MG EC tablet Take 1 tablet by mouth daily 30 tablet 5     No current facility-administered medications for this visit. Past Medical History:   Diagnosis Date    Abdominal aortic aneurysm (AAA) without rupture ([address] 4/18/2018    CT abdomen 4/2018 showed 3.2 cm AAA. It was normal on CT abdomen in 10/2016 4/8/2019 US : 4.5 x 4.3 cm size : increased significantly. Vascular consultation and recheck in one year. CT abdomen in 5/2019 : 3.3 cm : seen Dr Kris Marina. Recheck in one year     Acute gout of left ankle 2/2/2017    Resolved. Uric acid improved after d/c HCTZ    Bilateral carotid artery disease ([address] 11/4/2016    10/2016: Left ICA 50-69% and right ICA less than 50%  Dr Fay Browning following that. Recheck in 6/17 per pt. But pt states it was cancelled. Pt will call and set appt Carotid doppler : 7/2018 :  bilateral 0-49 %    COPD, severe ([address] 8/9/2019    PFTs done 6/2019 : severe obstructive disease.  Coronary artery disease involving native coronary artery of native heart without angina pectoris 11/04/2016    CABG x 4 on 9/24/16: Dr Emi Asif H/O colonoscopy 3-30-15    Dr. Funmilayo Shelby, rtoin 5 yrs  (polypectomy)    H/O Doppler ultrasound 07/19/2018    Carotid Doppler. Mild (0-49%) disease of bilateral internal carotid arteries, bilateral common carotid artery bulb to proximal ICA exhibits calcific plaque, left distal ICA end diastolic velocity is elevated but ratio is below 2.0, normal vertebral flow.  Hyperlipidemia     Hypertension     Incidental lung nodule 4 mm RUL. f/u in 10/15 4/22/2015    Ct shoulder showed 4 mm nodule in RUL in 4/15,CT chest 9/15 5mm. f/u in 4/16    Kidney stone on right side 9/29/2016    Large stone on right side on CT abdomen of 10/16    Nephrolithiasis     Right shoulder pain 3/17/2015    Pt had seen Dr. Mariposa Tejeda and had arthrogram and CT shoulder. Showed possible tear of biceps tendon. Conservative treatment,     Squamous papilloma 2/23/15    benign left oropharynx/nasopharynx Dr Heather Singh abuse disorder 1/20/2015    Pt has quit on 10/20/16       Past Surgical History:   Procedure Laterality Date    CARDIAC SURGERY  10/24/2016     CABG x4 SVG to RCA and Ramus, LIMA to LAD, DIAG sequ    COLONOSCOPY  3/15    Dr. Salvador Oliver. 5yr    HERNIA REPAIR      umbilical hernia    KIDNEY STONE SURGERY         Social History     Socioeconomic History    Marital status:      Spouse name: Not on file    Number of children: 1    Years of education: 15    Highest education level: Not on file   Occupational History     Comment: Heavy equipment   Social Needs    Financial resource strain: Not on file    Food insecurity     Worry: Not on file     Inability: Not on file    Transportation needs     Medical: Not on file     Non-medical: Not on file   Tobacco Use    Smoking status: Current Every Day Smoker     Packs/day: 1.00     Years: 1.00     Pack years: 1.00     Types: Cigarettes     Start date: 7/6/2017    Smokeless tobacco: Never Used   Substance and Sexual Activity    Alcohol use:  Yes     Alcohol/week: 6.0 standard drinks     Types: 6 Standard drinks or equivalent per week     Comment: 3 or 4 beers per day, most days    Drug use: Yes     Types: Marijuana     Comment: 2 or 3 times per week    Sexual activity: Yes     Partners: Female   Lifestyle    Physical activity     Days per week: Not on file     Minutes per session: Not on file    Stress: Not on file   Relationships    Social connections     Talks on phone: Not on file     Gets together: Not on file     Attends Orthodox service: Not on file     Active member of club or organization: Not on file     Attends meetings of clubs or organizations: Not on file     Relationship status: Not on file    Intimate partner violence     Fear of current or ex partner: Not on file     Emotionally abused: Not on file     Physically abused: Not on file     Forced sexual activity: Not on file   Other Topics Concern    Not on file   Social History Narrative    Not on file       Review of Systems   Constitutional: Negative. HENT: Negative. Eyes: Negative. Respiratory: Negative. Cardiovascular: Negative. Gastrointestinal: Negative. Genitourinary: Negative. Musculoskeletal: Positive for gait problem. Skin: Positive for wound. Psychiatric/Behavioral: Negative. There were no vitals filed for this visit. Physical Exam  Vitals signs reviewed. Constitutional:       Appearance: Normal appearance. HENT:      Head: Normocephalic. Nose: Nose normal.      Mouth/Throat:      Mouth: Mucous membranes are moist.   Eyes:      Pupils: Pupils are equal, round, and reactive to light. Neck:      Musculoskeletal: Normal range of motion. Cardiovascular:      Pulses:           Dorsalis pedis pulses are 1+ on the right side. Posterior tibial pulses are 2+ on the right side. Pulmonary:      Effort: Pulmonary effort is normal.      Breath sounds: Normal breath sounds. Abdominal:      General: Abdomen is flat. Bowel sounds are normal.      Palpations: Abdomen is soft. Musculoskeletal: Normal range of motion. General: Tenderness, deformity and signs of injury present. Right hip: He exhibits tenderness and swelling. Right lower leg: Edema present. Legs:       Right foot: Deformity present. Feet:    Feet:      Right foot:      Amputation: Right leg is amputated below ankle. Skin integrity: Skin breakdown and erythema present. Comments: Wound, amputation location   Skin:     General: Skin is warm. Findings: Ecchymosis present. Neurological:      Mental Status: He is alert and oriented to person, place, and time. Psychiatric:         Mood and Affect: Mood normal.         Behavior: Behavior normal.         Assessment and Plan:  1.  Amputation, toe, traumatic with complication,

## 2021-03-26 ENCOUNTER — TELEPHONE (OUTPATIENT)
Dept: INTERNAL MEDICINE CLINIC | Age: 56
End: 2021-03-26

## 2021-03-31 NOTE — TELEPHONE ENCOUNTER
This needs to be on an order that states that he needs to dc this, with no further testing needed. This needs faxed to [phone].

## 2021-03-31 NOTE — PROGRESS NOTES
Order wrote to cancel home oxygen therapy. Pt has not been wearing it, and his Oxygen levels are staying above 92% per spouse, in addition to when he was in the office.

## 2021-04-16 ENCOUNTER — OFFICE VISIT (OUTPATIENT)
Dept: INTERNAL MEDICINE CLINIC | Age: 56
End: 2021-04-16
Payer: COMMERCIAL

## 2021-04-16 VITALS
SYSTOLIC BLOOD PRESSURE: 122 MMHG | WEIGHT: 252 LBS | TEMPERATURE: 98.2 F | BODY MASS INDEX: 37.21 KG/M2 | RESPIRATION RATE: 16 BRPM | HEART RATE: 72 BPM | DIASTOLIC BLOOD PRESSURE: 68 MMHG | OXYGEN SATURATION: 96 %

## 2021-04-16 DIAGNOSIS — J44.9 COPD, SEVERE (HCC): Primary | ICD-10-CM

## 2021-04-16 DIAGNOSIS — S98.131A: ICD-10-CM

## 2021-04-16 DIAGNOSIS — I25.10 CORONARY ARTERY DISEASE INVOLVING NATIVE CORONARY ARTERY OF NATIVE HEART WITHOUT ANGINA PECTORIS: ICD-10-CM

## 2021-04-16 DIAGNOSIS — I77.9 BILATERAL CAROTID ARTERY DISEASE, UNSPECIFIED TYPE (HCC): ICD-10-CM

## 2021-04-16 DIAGNOSIS — I71.40 ABDOMINAL AORTIC ANEURYSM (AAA) WITHOUT RUPTURE: ICD-10-CM

## 2021-04-16 DIAGNOSIS — Z72.0 TOBACCO ABUSE DISORDER: ICD-10-CM

## 2021-04-16 DIAGNOSIS — Z94.5 S/P SPLIT THICKNESS SKIN GRAFT: ICD-10-CM

## 2021-04-16 DIAGNOSIS — I10 ESSENTIAL HYPERTENSION: ICD-10-CM

## 2021-04-16 DIAGNOSIS — R29.818 SUSPECTED SLEEP APNEA: ICD-10-CM

## 2021-04-16 DIAGNOSIS — E78.2 MIXED HYPERLIPIDEMIA: ICD-10-CM

## 2021-04-16 PROCEDURE — 3017F COLORECTAL CA SCREEN DOC REV: CPT | Performed by: INTERNAL MEDICINE

## 2021-04-16 PROCEDURE — 99214 OFFICE O/P EST MOD 30 MIN: CPT | Performed by: INTERNAL MEDICINE

## 2021-04-16 PROCEDURE — G8427 DOCREV CUR MEDS BY ELIG CLIN: HCPCS | Performed by: INTERNAL MEDICINE

## 2021-04-16 PROCEDURE — 1036F TOBACCO NON-USER: CPT | Performed by: INTERNAL MEDICINE

## 2021-04-16 PROCEDURE — G8417 CALC BMI ABV UP PARAM F/U: HCPCS | Performed by: INTERNAL MEDICINE

## 2021-04-16 PROCEDURE — 3023F SPIROM DOC REV: CPT | Performed by: INTERNAL MEDICINE

## 2021-04-16 PROCEDURE — G8926 SPIRO NO PERF OR DOC: HCPCS | Performed by: INTERNAL MEDICINE

## 2021-04-16 RX ORDER — FLUTICASONE FUROATE, UMECLIDINIUM BROMIDE AND VILANTEROL TRIFENATATE 100; 62.5; 25 UG/1; UG/1; UG/1
1 POWDER RESPIRATORY (INHALATION) DAILY
Qty: 1 EACH | Refills: 5
Start: 2021-04-16 | End: 2021-04-22

## 2021-04-16 RX ORDER — CARVEDILOL 6.25 MG/1
6.25 TABLET ORAL 2 TIMES DAILY
Qty: 60 TABLET | Refills: 5 | Status: SHIPPED | OUTPATIENT
Start: 2021-04-16 | End: 2021-12-13 | Stop reason: SDUPTHER

## 2021-04-16 RX ORDER — NAPROXEN 500 MG/1
500 TABLET ORAL 2 TIMES DAILY WITH MEALS
COMMUNITY
End: 2021-10-15 | Stop reason: ALTCHOICE

## 2021-04-16 RX ORDER — AMLODIPINE BESYLATE 5 MG/1
5 TABLET ORAL DAILY
Qty: 30 TABLET | Refills: 5 | Status: SHIPPED | OUTPATIENT
Start: 2021-04-16 | End: 2021-10-15

## 2021-04-16 NOTE — PROGRESS NOTES
questions, or concerns. Allergies   Allergen Reactions    Sulfa Antibiotics Rash     Current Outpatient Medications   Medication Sig Dispense Refill    naproxen (NAPROSYN) 500 MG tablet Take 500 mg by mouth 2 times daily (with meals)      tamsulosin (FLOMAX) 0.4 MG capsule Take 0.4 mg by mouth daily      tiotropium (SPIRIVA RESPIMAT) 2.5 MCG/ACT AERS inhaler Inhale 2 puffs into the lungs daily      gabapentin (NEURONTIN) 300 MG capsule Take 1 capsule by mouth 2 times daily for 30 days. 60 capsule 0    atorvastatin (LIPITOR) 40 MG tablet TAKE 1 TABLET BY MOUTH DAILY **STOP PRAVASTATIN** 30 tablet 5    albuterol sulfate  (90 Base) MCG/ACT inhaler INHALE 2 PUFFS INTO THE LUNGS EVERY 4 HOURS AS NEEDED FOR WHEEZING OR SHORTNESS OF BREATH 18 g 1    carvedilol (COREG) 6.25 MG tablet TAKE 1 TABLET BY MOUTH 2 TIMES DAILY 60 tablet 5    amLODIPine (NORVASC) 5 MG tablet TAKE 1 TABLET BY MOUTH DAILY 30 tablet 5    aspirin 81 MG EC tablet Take 1 tablet by mouth daily 30 tablet 5    UNABLE TO FIND 1 L by Nasal route nightly Please discontinue home oxygen. Pt is not using it, oxygen level are above 92%. 1 L 0    budesonide-formoterol (SYMBICORT) 160-4.5 MCG/ACT AERO Inhale 2 puffs into the lungs 2 times daily      Umeclidinium Bromide (INCRUSE ELLIPTA) 62.5 MCG/INH AEPB Inhale 1 puff into the lungs daily 1 each 5     No current facility-administered medications for this visit. Past Medical History:   Diagnosis Date    Abdominal aortic aneurysm (AAA) without rupture ([address] 4/18/2018    CT abdomen 4/2018 showed 3.2 cm AAA. It was normal on CT abdomen in 10/2016 4/8/2019 US : 4.5 x 4.3 cm size : increased significantly. Vascular consultation and recheck in one year. CT abdomen in 5/2019 : 3.3 cm : seen Dr Claudine Huber. Recheck in one year     Acute gout of left ankle 2/2/2017    Resolved.  Uric acid improved after d/c HCTZ    Bilateral carotid artery disease ([address] 11/4/2016    10/2016: Left ICA 50-69% and right ICA less than 50%  Dr Wesley Rued following that. Recheck in  per pt. But pt states it was cancelled. Pt will call and set appt Carotid doppler : 2018 :  bilateral 0-49 %    COPD, severe ([address] 2019    PFTs done 2019 : severe obstructive disease.  Coronary artery disease involving native coronary artery of native heart without angina pectoris 2016    CABG x 4 on 16: Dr Vera Whittington H/O colonoscopy 3-30-15    Dr. Jovi Allen, rtoin 5 yrs  (polypectomy)    H/O Doppler ultrasound 2018    Carotid Doppler. Mild (0-49%) disease of bilateral internal carotid arteries, bilateral common carotid artery bulb to proximal ICA exhibits calcific plaque, left distal ICA end diastolic velocity is elevated but ratio is below 2.0, normal vertebral flow.  Hyperlipidemia     Hypertension     Incidental lung nodule 4 mm RUL. f/u in 10/15 2015    Ct shoulder showed 4 mm nodule in RUL in 4/15,CT chest 9/15 5mm. f/u in     Kidney stone on right side 2016    Large stone on right side on CT abdomen of 10/16    Nephrolithiasis     Right shoulder pain 3/17/2015    Pt had seen Dr. Sly Iglesias and had arthrogram and CT shoulder. Showed possible tear of biceps tendon. Conservative treatment,     Squamous papilloma 2/23/15    benign left oropharynx/nasopharynx Dr Martinez Apkarmen abuse disorder 2015    Pt has quit on 10/20/16     Past Surgical History:   Procedure Laterality Date    CARDIAC SURGERY  10/24/2016     CABG x4 SVG to RCA and Ramus, LIMA to LAD, DIAG sequ    COLONOSCOPY  3/15    Dr. Jovi Allen. 5yr    HERNIA REPAIR      umbilical hernia    KIDNEY STONE SURGERY       Social History     Tobacco Use    Smoking status: Former Smoker     Packs/day: 1.00     Years: 1.00     Pack years: 1.00     Types: Cigarettes     Start date: 2017     Quit date: 2021     Years since quittin.1    Smokeless tobacco: Never Used   Substance Use Topics    Alcohol use:  Yes Alcohol/week: 6.0 standard drinks     Types: 6 Standard drinks or equivalent per week     Comment: 3 or 4 beers per day, most days       LAB REVIEW:  CBC:   Lab Results   Component Value Date    WBC 7.8 07/22/2020    HGB 17.6 07/22/2020    HCT 52.8 07/22/2020     07/22/2020     Lipids:   Lab Results   Component Value Date    HDL 45 01/22/2021    LDLCALC 77 01/22/2021    LDLDIRECT 73 11/02/2018    TRIGLYCFAST 98 01/22/2021    CHOLFAST 142 01/22/2021     Renal:   Lab Results   Component Value Date    BUN 12 01/22/2021    CREATININE 0.8 01/22/2021     01/22/2021    K 4.3 01/22/2021    ALT 16 01/22/2021    AST 17 01/22/2021    GLUCOSE 125 01/22/2021    GLUF 117 02/01/2018     PT/INR:   Lab Results   Component Value Date    INR 1.07 10/24/2016     A1C:   Lab Results   Component Value Date    LABA1C 5.8 07/22/2020           Herminia Alexis MD, 4/16/2021 , 9:05 AM

## 2021-04-20 ASSESSMENT — ENCOUNTER SYMPTOMS
ALLERGIC/IMMUNOLOGIC NEGATIVE: 1
SHORTNESS OF BREATH: 0
COUGH: 0
WHEEZING: 0
RESPIRATORY NEGATIVE: 1
GASTROINTESTINAL NEGATIVE: 1
EYES NEGATIVE: 1

## 2021-05-06 ENCOUNTER — TELEPHONE (OUTPATIENT)
Dept: INTERNAL MEDICINE CLINIC | Age: 56
End: 2021-05-06

## 2021-05-07 ENCOUNTER — TELEPHONE (OUTPATIENT)
Dept: INTERNAL MEDICINE CLINIC | Age: 56
End: 2021-05-07

## 2021-05-07 NOTE — TELEPHONE ENCOUNTER
Gisele Pennington from [facility] [address] contacted the office regarding the Pt needing clarification of direction to take. Gisele Pennington states that she is sending over a detailed fax and office may respond via fax.

## 2021-05-12 NOTE — TELEPHONE ENCOUNTER
LVM for [facility] stating PATIENT DOES NOT WANT THE EQUIPMENT, HE HAS TOLD THEM THIS AND WE HAVE SIGNED ALL PAPER WORK THAT IT SHOULD BE D/C. I ALSO LEFT THEM A MESSAGE THAT WE ARE NOT FILLING OUT ANYTHING ELSE THAT IT SHOULD BE D.C AS WE HAVE COMPLETED THE PAPER WORK.  THIS WAS CLARIFIED AND AGREEABLE WITH April

## 2021-07-16 ENCOUNTER — OFFICE VISIT (OUTPATIENT)
Dept: INTERNAL MEDICINE CLINIC | Age: 56
End: 2021-07-16
Payer: COMMERCIAL

## 2021-07-16 ENCOUNTER — HOSPITAL ENCOUNTER (OUTPATIENT)
Age: 56
Setting detail: SPECIMEN
Discharge: HOME | End: 2021-07-16
Payer: COMMERCIAL

## 2021-07-16 VITALS
WEIGHT: 248 LBS | DIASTOLIC BLOOD PRESSURE: 70 MMHG | TEMPERATURE: 97 F | HEART RATE: 70 BPM | SYSTOLIC BLOOD PRESSURE: 110 MMHG | BODY MASS INDEX: 36.62 KG/M2 | OXYGEN SATURATION: 90 %

## 2021-07-16 DIAGNOSIS — J44.9 COPD, SEVERE (HCC): ICD-10-CM

## 2021-07-16 DIAGNOSIS — I25.10 CORONARY ARTERY DISEASE INVOLVING NATIVE CORONARY ARTERY OF NATIVE HEART WITHOUT ANGINA PECTORIS: ICD-10-CM

## 2021-07-16 DIAGNOSIS — R29.818 SUSPECTED SLEEP APNEA: ICD-10-CM

## 2021-07-16 DIAGNOSIS — Z72.0 TOBACCO ABUSE DISORDER: ICD-10-CM

## 2021-07-16 DIAGNOSIS — N20.0 KIDNEY STONE ON RIGHT SIDE: ICD-10-CM

## 2021-07-16 DIAGNOSIS — I77.9 BILATERAL CAROTID ARTERY DISEASE, UNSPECIFIED TYPE (HCC): ICD-10-CM

## 2021-07-16 DIAGNOSIS — Z94.5 S/P SPLIT THICKNESS SKIN GRAFT: ICD-10-CM

## 2021-07-16 DIAGNOSIS — J02.9 SORE THROAT: Primary | ICD-10-CM

## 2021-07-16 DIAGNOSIS — I71.40 ABDOMINAL AORTIC ANEURYSM (AAA) WITHOUT RUPTURE: ICD-10-CM

## 2021-07-16 DIAGNOSIS — E78.2 MIXED HYPERLIPIDEMIA: ICD-10-CM

## 2021-07-16 DIAGNOSIS — S98.131A: ICD-10-CM

## 2021-07-16 DIAGNOSIS — I10 ESSENTIAL HYPERTENSION: ICD-10-CM

## 2021-07-16 LAB
SARS-COV-2: NOT DETECTED
SOURCE: NORMAL

## 2021-07-16 PROCEDURE — U0005 INFEC AGEN DETEC AMPLI PROBE: HCPCS

## 2021-07-16 PROCEDURE — G8926 SPIRO NO PERF OR DOC: HCPCS | Performed by: INTERNAL MEDICINE

## 2021-07-16 PROCEDURE — 99214 OFFICE O/P EST MOD 30 MIN: CPT | Performed by: INTERNAL MEDICINE

## 2021-07-16 PROCEDURE — 87635 SARS-COV-2 COVID-19 AMP PRB: CPT

## 2021-07-16 PROCEDURE — 3023F SPIROM DOC REV: CPT | Performed by: INTERNAL MEDICINE

## 2021-07-16 PROCEDURE — G8427 DOCREV CUR MEDS BY ELIG CLIN: HCPCS | Performed by: INTERNAL MEDICINE

## 2021-07-16 PROCEDURE — U0003 INFECTIOUS AGENT DETECTION BY NUCLEIC ACID (DNA OR RNA); SEVERE ACUTE RESPIRATORY SYNDROME CORONAVIRUS 2 (SARS-COV-2) (CORONAVIRUS DISEASE [COVID-19]), AMPLIFIED PROBE TECHNIQUE, MAKING USE OF HIGH THROUGHPUT TECHNOLOGIES AS DESCRIBED BY CMS-2020-01-R: HCPCS

## 2021-07-16 PROCEDURE — 3017F COLORECTAL CA SCREEN DOC REV: CPT | Performed by: INTERNAL MEDICINE

## 2021-07-16 PROCEDURE — G8417 CALC BMI ABV UP PARAM F/U: HCPCS | Performed by: INTERNAL MEDICINE

## 2021-07-16 PROCEDURE — 1036F TOBACCO NON-USER: CPT | Performed by: INTERNAL MEDICINE

## 2021-07-16 RX ORDER — ATORVASTATIN CALCIUM 40 MG/1
TABLET, FILM COATED ORAL
Qty: 30 TABLET | Refills: 5 | Status: CANCELLED | OUTPATIENT
Start: 2021-07-16

## 2021-07-16 RX ORDER — FLUTICASONE FUROATE, UMECLIDINIUM BROMIDE AND VILANTEROL TRIFENATATE 100; 62.5; 25 UG/1; UG/1; UG/1
POWDER RESPIRATORY (INHALATION)
COMMUNITY
Start: 2021-03-25 | End: 2021-10-15

## 2021-07-16 NOTE — ASSESSMENT & PLAN NOTE
patient has severe COPD on pulmonary function test.  Patient states he quit smoking only yesterday.   He is taking albuterol inhaler and Trelegy

## 2021-07-16 NOTE — ASSESSMENT & PLAN NOTE
patient has coronary artery disease that is stable. Continue aspirin, statin and Coreg.   Patient sees cardiologist

## 2021-07-16 NOTE — PROGRESS NOTES
Maude Ocampo  Patient's  is 1965  Seen in office on 2021      SUBJECTIVE:  Elisa Souza mi 60 y. o.year old male presents today   Chief Complaint   Patient presents with    3 Month Follow-Up    Shortness of Breath     Patient states he has had some sob and sore throat. He said his grandchlild was over and not feeling well and now he is not. Patient is here for follow-up of hypertension hyperlipidemia COPDAnd coronary artery disease  Patient states his foot has a healed. He had crush injury of the right foot and had amputation of the lateral 3 toes. He was released to go back to work and he started his new job this week. Patient states he had some sore throat last Tuesday and has some shortness of breath he quit smoking yesterday. He has postnasal drip. No fever or chills. No headaches. No nausea or vomiting. He is not vaccinated with COVID-19. He has COPD and using his inhalers  Taking medications regularly. No side effects noted. Review of Systems     Review of system is normal except as in HPI      OBJECTIVE: /70   Pulse 70   Temp 97 °F (36.1 °C)   Wt 248 lb (112.5 kg)   SpO2 90%   BMI 36.62 kg/m²     Wt Readings from Last 3 Encounters:   21 248 lb (112.5 kg)   21 252 lb (114.3 kg)   02/10/21 237 lb 6.4 oz (107.7 kg)        Patient was seen taking COVID-19 precautions. Face mask, gloves were used. Patient also wore facemask. GENERAL:  Alert, oriented, pleasant, in no apparent distress. HEENT:  Conjunctiva pink, no scleral icterus. ENT clear. NECK: Supple. No jugular venous distention noted. No masses felt,  CARDIOVASCULAR:  Normal S1 and S2    PULMONARY:  No respiratory distress. No wheezes or rales. ABDOMEN:  Soft and non-tender,no masses  ororganomegaly. EXTREMITIES:  No cyanosis, clubbing, or significant edema. SKIN: Skin is warm and dry. NEUROLOGICAL:  Cranial nerves II through XII are grossly intact. number [phone] QUIT NOW  given to patient. Suspected sleep apnea    Patient was evaluated with cystoscopy and the anesthesiologist felt patient has sleep apnea. Bang score was 5. Discussed to get sleep study : wants to wait  Send for apnea link  Pt is suspected to have sleep apnea  He was referred 2 times but pt did not get any call per patient. S/P split thickness skin graft     Return to office in 3 months. Mediations reviewed with the patient. Continue current medications. Appropriate prescriptions are addressed. After visit summeryprovided. Follow up as directed sooner if needed. Questions answered and patient verbalizes understanding. Call for any problems, questions, or concerns. Allergies   Allergen Reactions    Sulfa Antibiotics Rash     Current Outpatient Medications   Medication Sig Dispense Refill    naproxen (NAPROSYN) 500 MG tablet Take 500 mg by mouth 2 times daily (with meals)      carvedilol (COREG) 6.25 MG tablet Take 1 tablet by mouth 2 times daily 60 tablet 5    amLODIPine (NORVASC) 5 MG tablet Take 1 tablet by mouth daily 30 tablet 5    UNABLE TO FIND 1 L by Nasal route nightly Please discontinue home oxygen. Pt is not using it, oxygen level are above 92%. 1 L 0    tamsulosin (FLOMAX) 0.4 MG capsule Take 0.4 mg by mouth daily      atorvastatin (LIPITOR) 40 MG tablet TAKE 1 TABLET BY MOUTH DAILY **STOP PRAVASTATIN** 30 tablet 5    albuterol sulfate  (90 Base) MCG/ACT inhaler INHALE 2 PUFFS INTO THE LUNGS EVERY 4 HOURS AS NEEDED FOR WHEEZING OR SHORTNESS OF BREATH 18 g 1    aspirin 81 MG EC tablet Take 1 tablet by mouth daily 30 tablet 5     No current facility-administered medications for this visit. Past Medical History:   Diagnosis Date    Abdominal aortic aneurysm (AAA) without rupture ([address] 4/18/2018    CT abdomen 4/2018 showed 3.2 cm AAA. It was normal on CT abdomen in 10/2016 4/8/2019 US : 4.5 x 4.3 cm size : increased significantly.  Vascular consultation and recheck in one year. CT abdomen in 5/2019 : 3.3 cm : seen Dr Aidan Ramirez. Recheck in one year     Acute gout of left ankle 2/2/2017    Resolved. Uric acid improved after d/c HCTZ    Bilateral carotid artery disease ([address] 11/4/2016    10/2016: Left ICA 50-69% and right ICA less than 50%  Dr Harry Gibbs following that. Recheck in 6/17 per pt. But pt states it was cancelled. Pt will call and set appt Carotid doppler : 7/2018 :  bilateral 0-49 %    COPD, severe ([address] 8/9/2019    PFTs done 6/2019 : severe obstructive disease.  Coronary artery disease involving native coronary artery of native heart without angina pectoris 11/04/2016    CABG x 4 on 9/24/16: Dr Magdalene Moreno H/O colonoscopy 3-30-15    Dr. Guardado Needs, rtoin 5 yrs  (polypectomy)    H/O Doppler ultrasound 07/19/2018    Carotid Doppler. Mild (0-49%) disease of bilateral internal carotid arteries, bilateral common carotid artery bulb to proximal ICA exhibits calcific plaque, left distal ICA end diastolic velocity is elevated but ratio is below 2.0, normal vertebral flow.  Hyperlipidemia     Hypertension     Incidental lung nodule 4 mm RUL. f/u in 10/15 4/22/2015    Ct shoulder showed 4 mm nodule in RUL in 4/15,CT chest 9/15 5mm. f/u in 4/16    Kidney stone on right side 9/29/2016    Large stone on right side on CT abdomen of 10/16    Nephrolithiasis     Right shoulder pain 3/17/2015    Pt had seen Dr. Domingo Delgado and had arthrogram and CT shoulder. Showed possible tear of biceps tendon. Conservative treatment,     Squamous papilloma 2/23/15    benign left oropharynx/nasopharynx Dr Rivers Meth abuse disorder 1/20/2015    Pt has quit on 10/20/16     Past Surgical History:   Procedure Laterality Date    CARDIAC SURGERY  10/24/2016     CABG x4 SVG to RCA and Ramus, LIMA to LAD, DIAG sequ    COLONOSCOPY  03/2015    Dr. Guardado Needs. 5yr    COLONOSCOPY      colonoscopy with polypectomy 6-7-2021 nathaly in 5 years.     HERNIA REPAIR umbilical hernia    KIDNEY STONE SURGERY       Social History     Tobacco Use    Smoking status: Former Smoker     Packs/day: 1.00     Years: 1.00     Pack years: 1.00     Types: Cigarettes     Start date: 2017     Quit date: 2021     Years since quittin.3    Smokeless tobacco: Never Used   Substance Use Topics    Alcohol use:  Yes     Alcohol/week: 6.0 standard drinks     Types: 6 Standard drinks or equivalent per week     Comment: 3 or 4 beers per day, most days       LAB REVIEW:  CBC:   Lab Results   Component Value Date    WBC 7.8 2020    HGB 17.6 2020    HCT 52.8 2020     2020     Lipids:   Lab Results   Component Value Date    HDL 45 2021    LDLCALC 77 2021    LDLDIRECT 73 2018    TRIGLYCFAST 98 2021    CHOLFAST 142 2021     Renal:   Lab Results   Component Value Date    BUN 12 2021    CREATININE 0.8 2021     2021    K 4.3 2021    ALT 16 2021    AST 17 2021    GLUCOSE 125 2021    GLUF 117 2018     PT/INR:   Lab Results   Component Value Date    INR 1.07 10/24/2016     A1C:   Lab Results   Component Value Date    LABA1C 5.8 2020           Adriano Quinn MD, 2021 , 9:29 AM

## 2021-07-16 NOTE — ASSESSMENT & PLAN NOTE
Vascular consultation and recheck in one year. CT abdomen in 5/2019 : 3.3 cm : seen Dr Patrick Castro.  Recheck in one year   CT abdomen 2/2021 : 3.5-3.9 cm

## 2021-07-23 NOTE — ASSESSMENT & PLAN NOTE
Patient was evaluated with cystoscopy and the anesthesiologist felt patient has sleep apnea. Bang score was 5. Discussed to get sleep study : wants to wait  Send for apnea link  Pt is suspected to have sleep apnea  He was referred 2 times but pt did not get any call per patient.

## 2021-07-23 NOTE — ASSESSMENT & PLAN NOTE
10/2016: Left ICA 50-69% and right ICA less than 50%   Dr Reena Rodas following that. Recheck in 6/17 per pt. But pt states it was cancelled.  Pt will call and set appt  Carotid doppler : 7/2018 :  bilateral 0-49 %

## 2021-10-15 ENCOUNTER — OFFICE VISIT (OUTPATIENT)
Dept: INTERNAL MEDICINE CLINIC | Age: 56
End: 2021-10-15
Payer: COMMERCIAL

## 2021-10-15 VITALS
HEART RATE: 83 BPM | DIASTOLIC BLOOD PRESSURE: 80 MMHG | RESPIRATION RATE: 18 BRPM | OXYGEN SATURATION: 90 % | HEIGHT: 69 IN | SYSTOLIC BLOOD PRESSURE: 118 MMHG | TEMPERATURE: 98.9 F | BODY MASS INDEX: 36.58 KG/M2 | WEIGHT: 247 LBS

## 2021-10-15 DIAGNOSIS — R91.1 INCIDENTAL LUNG NODULE: ICD-10-CM

## 2021-10-15 DIAGNOSIS — S98.131A: ICD-10-CM

## 2021-10-15 DIAGNOSIS — I25.10 CORONARY ARTERY DISEASE INVOLVING NATIVE CORONARY ARTERY OF NATIVE HEART WITHOUT ANGINA PECTORIS: ICD-10-CM

## 2021-10-15 DIAGNOSIS — E78.2 MIXED HYPERLIPIDEMIA: ICD-10-CM

## 2021-10-15 DIAGNOSIS — I10 ESSENTIAL HYPERTENSION: Primary | ICD-10-CM

## 2021-10-15 DIAGNOSIS — J44.9 COPD, SEVERE (HCC): ICD-10-CM

## 2021-10-15 DIAGNOSIS — I71.40 ABDOMINAL AORTIC ANEURYSM (AAA) WITHOUT RUPTURE: ICD-10-CM

## 2021-10-15 DIAGNOSIS — N20.0 KIDNEY STONE ON RIGHT SIDE: ICD-10-CM

## 2021-10-15 DIAGNOSIS — R63.5 WEIGHT GAIN: ICD-10-CM

## 2021-10-15 DIAGNOSIS — Z72.0 TOBACCO ABUSE DISORDER: ICD-10-CM

## 2021-10-15 PROCEDURE — 1036F TOBACCO NON-USER: CPT | Performed by: INTERNAL MEDICINE

## 2021-10-15 PROCEDURE — G8427 DOCREV CUR MEDS BY ELIG CLIN: HCPCS | Performed by: INTERNAL MEDICINE

## 2021-10-15 PROCEDURE — 99214 OFFICE O/P EST MOD 30 MIN: CPT | Performed by: INTERNAL MEDICINE

## 2021-10-15 PROCEDURE — G8484 FLU IMMUNIZE NO ADMIN: HCPCS | Performed by: INTERNAL MEDICINE

## 2021-10-15 PROCEDURE — G8926 SPIRO NO PERF OR DOC: HCPCS | Performed by: INTERNAL MEDICINE

## 2021-10-15 PROCEDURE — G8417 CALC BMI ABV UP PARAM F/U: HCPCS | Performed by: INTERNAL MEDICINE

## 2021-10-15 PROCEDURE — 3023F SPIROM DOC REV: CPT | Performed by: INTERNAL MEDICINE

## 2021-10-15 PROCEDURE — 3017F COLORECTAL CA SCREEN DOC REV: CPT | Performed by: INTERNAL MEDICINE

## 2021-10-15 RX ORDER — AMLODIPINE BESYLATE 5 MG/1
TABLET ORAL
Qty: 30 TABLET | Refills: 0 | Status: SHIPPED | OUTPATIENT
Start: 2021-10-15 | End: 2021-11-26

## 2021-10-15 RX ORDER — ALBUTEROL SULFATE 90 UG/1
AEROSOL, METERED RESPIRATORY (INHALATION)
Qty: 18 G | Refills: 1 | Status: SHIPPED | OUTPATIENT
Start: 2021-10-15 | End: 2021-12-23 | Stop reason: SDUPTHER

## 2021-10-15 RX ORDER — ATORVASTATIN CALCIUM 40 MG/1
TABLET, FILM COATED ORAL
Qty: 30 TABLET | Refills: 5 | Status: ON HOLD | OUTPATIENT
Start: 2021-10-15 | End: 2021-12-14

## 2021-10-15 NOTE — ASSESSMENT & PLAN NOTE
CT abdomen 4/2018 showed 3.2 cm AAA. It was normal on CT abdomen in 10/2016  4/8/2019 US : 4.5 x 4.3 cm size : increased significantly. Vascular consultation and recheck in one year. CT abdomen in 5/2019 : 3.3 cm : seen Dr Tammy Cheney.  Recheck in one year   CT abdomen 2/2021 : 3.5-3.9 cm : recheck in 2 years

## 2021-10-15 NOTE — ASSESSMENT & PLAN NOTE
PFTs done 6/2019 : severe obstructive disease. Patient is taking albuterol inhaler and incruse  Trelegy ws ordered. Pt stopped it because of cost.  Using albuterol inhaler. Doing well with it. Started smoking again.  1/2 ppd

## 2021-10-15 NOTE — ASSESSMENT & PLAN NOTE
Takes amlodipine and Coreg  Hypertension in control. Follow low salt diet. Continue current treatment.

## 2021-10-15 NOTE — ASSESSMENT & PLAN NOTE
Had MI in 10/16,Cath revealed 100% occluded RCA, 90% LAD and circumflex disease. CABG x 4 on 10/24/16: Dr Jeri Dixon,  LIMA to LAd and D1  SVG to PDA  svg to ramus  On statin, ASA, BB(coreg),   Sees cardiologist Dr Hazel Ybarra  Patient is stable. Continue current treatment.

## 2021-10-15 NOTE — PROGRESS NOTES
Carmelo Stanton  Patient's  is 1965  Seen in office on 10/15/2021      SUBJECTIVE:  Kurtis Cowart mi 60 y. o.year old male presents today   Chief Complaint   Patient presents with    3 Month Follow-Up     Pt states he has passed kidney stones in the last 2 weeks     Pt is here for f/u of HTn HLD   Started smoking again  Not able to afford inhalers. Can afford only albuterol  Patient has hypertension. Taking medications No headaches, no chest pain, no palpitations and no dizziness. Patient has hyperlipidemia. Taking medications. No abdominal pain, no nausea or vomiting. No myalgias. Taking medications regularly. No side effects noted. Review of Systems    Review of system is normal except as in HPI    OBJECTIVE: /80 (Site: Left Upper Arm, Position: Sitting, Cuff Size: Small Adult)   Pulse 83   Temp 98.9 °F (37.2 °C) (Oral)   Resp 18   Ht 5' 9\" (1.753 m)   Wt 247 lb (112 kg)   SpO2 90%   BMI 36.48 kg/m²     Wt Readings from Last 3 Encounters:   10/15/21 247 lb (112 kg)   21 248 lb (112.5 kg)   21 252 lb (114.3 kg)        Patient was seen taking COVID-19 precautions. Face mask, gloves were used. Patient also wore facemask. GENERAL:  Alert, oriented, pleasant, in no apparent distress. HEENT:  Conjunctiva pink, no scleral icterus. ENT clear. NECK: Supple. No jugular venous distention noted. No masses felt,  CARDIOVASCULAR:  Normal S1 and S2    PULMONARY:  No respiratory distress. No wheezes or rales. ABDOMEN:  Soft and non-tender,no masses  ororganomegaly. EXTREMITIES:  No cyanosis, clubbing, or significant edema. SKIN: Skin is warm and dry. NEUROLOGICAL:  Cranial nerves II through XII are grossly intact. IMPRESSION:    Encounter Diagnoses   Name Primary?  Essential hypertension Yes    Mixed hyperlipidemia     Tobacco abuse disorder     Incidental lung nodule 4 mm RUL.  f/u in 10/15     Kidney stone on right side     Coronary artery disease s/p MI, CABG x 4     Abdominal aortic aneurysm (AAA) without rupture ([facility])     COPD, severe (HCC)     Weight gain     Amputation, toe, traumatic with complication, right, initial encounter [facility])        ASSESSMENT/PLAN:      Essential hypertension    Takes amlodipine and Coreg  Hypertension in control. Follow low salt diet. Continue current treatment. Mixed hyperlipidemia   Pt is taking atorvastatin. Follow diet. Recheck lipid     Tobacco abuse disorder   Pt has quit on 10/20/16. Pt started smoking again  Patient is a smoker. Counseled to quit smoking. Various options given. Help number 1-800 QUIT NOW  given to patient. Incidental lung nodule 4 mm RUL. f/u in 10/15       Kidney stone on right side   Pt states he passed kidney stones twice. Doing well. Seen urologist in the past.     Coronary artery disease s/p MI, CABG x 4   Had MI in 10/16,Cath revealed 100% occluded RCA, 90% LAD and circumflex disease. CABG x 4 on 10/24/16: Dr Davina Solomon,  LAWSON to LAd and D1  SVG to PDA  svg to ramus  On statin, ASA, BB(coreg),   Sees cardiologist Dr Harpreet Mcneal  Patient is stable. Continue current treatment. Abdominal aortic aneurysm (AAA) without rupture ([address]    CT abdomen 4/2018 showed 3.2 cm AAA. It was normal on CT abdomen in 10/2016  4/8/2019 US : 4.5 x 4.3 cm size : increased significantly. Vascular consultation and recheck in one year. CT abdomen in 5/2019 : 3.3 cm : seen Dr Suki Leon. Recheck in one year   CT abdomen 2/2021 : 3.5-3.9 cm : recheck in 2 years     COPD, severe ([address]   PFTs done 6/2019 : severe obstructive disease. Patient is taking albuterol inhaler and incruse  Trelegy ws ordered. Pt stopped it because of cost.  Using albuterol inhaler. Doing well with it. Started smoking again. 1/2 ppd     Weight gain ; stable       Amputation, toe, traumatic with complication, right, initial encounter ([address]    2/25/2020 : Crush injury of right foot with amputation of the lateral 3 toes. It healed. Return to office in 3 months. Orders Placed This Encounter   Procedures    CBC Auto Differential    Comprehensive Metabolic Panel    Lipid, Fasting         Mediations reviewed with the patient. Continue current medications. Appropriate prescriptions are addressed. After visit summeryprovided. Follow up as directed sooner if needed. Questions answered and patient verbalizes understanding. Call for any problems, questions, or concerns. Allergies   Allergen Reactions    Sulfa Antibiotics Rash     Current Outpatient Medications   Medication Sig Dispense Refill    carvedilol (COREG) 6.25 MG tablet Take 1 tablet by mouth 2 times daily 60 tablet 5    tamsulosin (FLOMAX) 0.4 MG capsule Take 0.4 mg by mouth daily      atorvastatin (LIPITOR) 40 MG tablet TAKE 1 TABLET BY MOUTH DAILY **STOP PRAVASTATIN** 30 tablet 5    albuterol sulfate  (90 Base) MCG/ACT inhaler INHALE 2 PUFFS INTO THE LUNGS EVERY 4 HOURS AS NEEDED FOR WHEEZING OR SHORTNESS OF BREATH 18 g 1    aspirin 81 MG EC tablet Take 1 tablet by mouth daily 30 tablet 5    amLODIPine (NORVASC) 5 MG tablet TAKE 1 TABLET ONCE A DAY ORALLY (GENERIC NORVASC) 30 tablet 0    fluticasone-umeclidin-vilant (TRELEGY ELLIPTA) 100-62.5-25 MCG/INH AEPB  (Patient not taking: Reported on 10/15/2021)      naproxen (NAPROSYN) 500 MG tablet Take 500 mg by mouth 2 times daily (with meals) (Patient not taking: Reported on 10/15/2021)      amLODIPine (NORVASC) 5 MG tablet Take 1 tablet by mouth daily 30 tablet 5    UNABLE TO FIND 1 L by Nasal route nightly Please discontinue home oxygen. Pt is not using it, oxygen level are above 92%.  1 L 0    pravastatin (PRAVACHOL) 40 MG tablet TAKE 1 TABLET BY MOUTH EVERY EVENING (Patient not taking: Reported on 10/15/2021) 30 tablet 0    labetalol (NORMODYNE) 300 MG tablet TAKE 1 TABLET TWICE A DAY ORALLY (Patient not taking: Reported on 10/15/2021) 60 tablet 0    cloNIDine (CATAPRES) 0.1 MG tablet TAKE 1 TABLET TWICE A DAY ORALLY (Patient not taking: Reported on 10/15/2021) 60 tablet 0     No current facility-administered medications for this visit. Past Medical History:   Diagnosis Date    Abdominal aortic aneurysm (AAA) without rupture ([address] 4/18/2018    CT abdomen 4/2018 showed 3.2 cm AAA. It was normal on CT abdomen in 10/2016 4/8/2019 US : 4.5 x 4.3 cm size : increased significantly. Vascular consultation and recheck in one year. CT abdomen in 5/2019 : 3.3 cm : seen Dr Mary Schaefer. Recheck in one year     Acute gout of left ankle 2/2/2017    Resolved. Uric acid improved after d/c HCTZ    Bilateral carotid artery disease ([address] 11/4/2016    10/2016: Left ICA 50-69% and right ICA less than 50%  Dr Gavi Mejía following that. Recheck in 6/17 per pt. But pt states it was cancelled. Pt will call and set appt Carotid doppler : 7/2018 :  bilateral 0-49 %    COPD, severe ([address] 8/9/2019    PFTs done 6/2019 : severe obstructive disease.  Coronary artery disease involving native coronary artery of native heart without angina pectoris 11/04/2016    CABG x 4 on 9/24/16: Dr Girma Rivera H/O colonoscopy 3-30-15    Dr. Tsering Gibbs, rtoin 5 yrs  (polypectomy)    H/O Doppler ultrasound 07/19/2018    Carotid Doppler. Mild (0-49%) disease of bilateral internal carotid arteries, bilateral common carotid artery bulb to proximal ICA exhibits calcific plaque, left distal ICA end diastolic velocity is elevated but ratio is below 2.0, normal vertebral flow.  Hyperlipidemia     Hypertension     Incidental lung nodule 4 mm RUL. f/u in 10/15 4/22/2015    Ct shoulder showed 4 mm nodule in RUL in 4/15,CT chest 9/15 5mm. f/u in 4/16    Kidney stone on right side 9/29/2016    Large stone on right side on CT abdomen of 10/16    Nephrolithiasis     Right shoulder pain 3/17/2015    Pt had seen Dr. Osbaldo Otero and had arthrogram and CT shoulder. Showed possible tear of biceps tendon.  Conservative treatment,     Squamous papilloma 2/23/15 benign left oropharynx/nasopharynx Dr Neris Christianson abuse disorder 2015    Pt has quit on 10/20/16     Past Surgical History:   Procedure Laterality Date    CARDIAC SURGERY  10/24/2016     CABG x4 SVG to RCA and Ramus, LIMA to LAD, DIAG sequ    COLONOSCOPY  2015    Dr. Vu [place]. 5yr    COLONOSCOPY      colonoscopy with polypectomy 2021 nathaly in 5 years.  HERNIA REPAIR      umbilical hernia    KIDNEY STONE SURGERY       Social History     Tobacco Use    Smoking status: Former Smoker     Packs/day: 1.00     Years: 1.00     Pack years: 1.00     Types: Cigarettes     Start date: 2017     Quit date: 2021     Years since quittin.6    Smokeless tobacco: Never Used   Substance Use Topics    Alcohol use:  Yes     Alcohol/week: 6.0 standard drinks     Types: 6 Standard drinks or equivalent per week     Comment: 3 or 4 beers per day, most days       LAB REVIEW:  CBC:   Lab Results   Component Value Date    WBC 7.8 2020    HGB 17.6 2020    HCT 52.8 2020     2020     Lipids:   Lab Results   Component Value Date    HDL 45 2021    LDLCALC 77 2021    LDLDIRECT 73 2018    TRIGLYCFAST 98 2021    CHOLFAST 142 2021     Renal:   Lab Results   Component Value Date    BUN 12 2021    CREATININE 0.8 2021     2021    K 4.3 2021    ALT 16 2021    AST 17 2021    GLUCOSE 125 2021    GLUF 117 2018     PT/INR:   Lab Results   Component Value Date    INR 1.07 10/24/2016     A1C:   Lab Results   Component Value Date    LABA1C 5.8 2020           Maylin Kelley MD, 10/15/2021 , 9:14 AM

## 2021-10-22 ENCOUNTER — NURSE ONLY (OUTPATIENT)
Dept: INTERNAL MEDICINE CLINIC | Age: 56
End: 2021-10-22
Payer: COMMERCIAL

## 2021-10-22 DIAGNOSIS — I10 ESSENTIAL HYPERTENSION: ICD-10-CM

## 2021-10-22 DIAGNOSIS — E78.2 MIXED HYPERLIPIDEMIA: ICD-10-CM

## 2021-10-22 LAB
BASOPHILS ABSOLUTE: 0.1 K/UL (ref 0–0.2)
BASOPHILS RELATIVE PERCENT: 1 %
EOSINOPHILS ABSOLUTE: 0.2 K/UL (ref 0–0.6)
EOSINOPHILS RELATIVE PERCENT: 2.2 %
HCT VFR BLD CALC: 49.7 % (ref 40.5–52.5)
HEMOGLOBIN: 17 G/DL (ref 13.5–17.5)
LYMPHOCYTES ABSOLUTE: 2.1 K/UL (ref 1–5.1)
LYMPHOCYTES RELATIVE PERCENT: 26.7 %
MCH RBC QN AUTO: 31 PG (ref 26–34)
MCHC RBC AUTO-ENTMCNC: 34.1 G/DL (ref 31–36)
MCV RBC AUTO: 91.1 FL (ref 80–100)
MONOCYTES ABSOLUTE: 0.6 K/UL (ref 0–1.3)
MONOCYTES RELATIVE PERCENT: 7.3 %
NEUTROPHILS ABSOLUTE: 5 K/UL (ref 1.7–7.7)
NEUTROPHILS RELATIVE PERCENT: 62.8 %
PDW BLD-RTO: 13.3 % (ref 12.4–15.4)
PLATELET # BLD: 217 K/UL (ref 135–450)
PMV BLD AUTO: 7.8 FL (ref 5–10.5)
RBC # BLD: 5.46 M/UL (ref 4.2–5.9)
WBC # BLD: 7.9 K/UL (ref 4–11)

## 2021-10-22 PROCEDURE — 36415 COLL VENOUS BLD VENIPUNCTURE: CPT | Performed by: INTERNAL MEDICINE

## 2021-10-23 LAB
A/G RATIO: 2.2 (ref 1.1–2.2)
ALBUMIN SERPL-MCNC: 4.2 G/DL (ref 3.4–5)
ALP BLD-CCNC: 67 U/L (ref 40–129)
ALT SERPL-CCNC: 11 U/L (ref 10–40)
ANION GAP SERPL CALCULATED.3IONS-SCNC: 11 MMOL/L (ref 3–16)
AST SERPL-CCNC: 13 U/L (ref 15–37)
BILIRUB SERPL-MCNC: 0.5 MG/DL (ref 0–1)
BUN BLDV-MCNC: 13 MG/DL (ref 7–20)
CALCIUM SERPL-MCNC: 9.1 MG/DL (ref 8.3–10.6)
CHLORIDE BLD-SCNC: 100 MMOL/L (ref 99–110)
CHOLESTEROL, FASTING: 111 MG/DL (ref 0–199)
CO2: 30 MMOL/L (ref 21–32)
CREAT SERPL-MCNC: 0.7 MG/DL (ref 0.9–1.3)
GFR AFRICAN AMERICAN: >60
GFR NON-AFRICAN AMERICAN: >60
GLOBULIN: 1.9 G/DL
GLUCOSE BLD-MCNC: 102 MG/DL (ref 70–99)
HDLC SERPL-MCNC: 39 MG/DL (ref 40–60)
LDL CHOLESTEROL CALCULATED: 63 MG/DL
POTASSIUM SERPL-SCNC: 4.4 MMOL/L (ref 3.5–5.1)
SODIUM BLD-SCNC: 141 MMOL/L (ref 136–145)
TOTAL PROTEIN: 6.1 G/DL (ref 6.4–8.2)
TRIGLYCERIDE, FASTING: 46 MG/DL (ref 0–150)
VLDLC SERPL CALC-MCNC: 9 MG/DL

## 2021-11-29 ENCOUNTER — OFFICE VISIT (OUTPATIENT)
Dept: INTERNAL MEDICINE CLINIC | Age: 56
End: 2021-11-29
Payer: COMMERCIAL

## 2021-11-29 VITALS
SYSTOLIC BLOOD PRESSURE: 128 MMHG | OXYGEN SATURATION: 92 % | DIASTOLIC BLOOD PRESSURE: 72 MMHG | TEMPERATURE: 98.1 F | RESPIRATION RATE: 20 BRPM | HEART RATE: 80 BPM | BODY MASS INDEX: 36.24 KG/M2 | WEIGHT: 245.4 LBS

## 2021-11-29 DIAGNOSIS — R04.0 EPISTAXIS: ICD-10-CM

## 2021-11-29 DIAGNOSIS — K59.01 SLOW TRANSIT CONSTIPATION: ICD-10-CM

## 2021-11-29 PROCEDURE — 90471 IMMUNIZATION ADMIN: CPT | Performed by: INTERNAL MEDICINE

## 2021-11-29 PROCEDURE — G8417 CALC BMI ABV UP PARAM F/U: HCPCS | Performed by: INTERNAL MEDICINE

## 2021-11-29 PROCEDURE — G8482 FLU IMMUNIZE ORDER/ADMIN: HCPCS | Performed by: INTERNAL MEDICINE

## 2021-11-29 PROCEDURE — 3017F COLORECTAL CA SCREEN DOC REV: CPT | Performed by: INTERNAL MEDICINE

## 2021-11-29 PROCEDURE — 1036F TOBACCO NON-USER: CPT | Performed by: INTERNAL MEDICINE

## 2021-11-29 PROCEDURE — 90674 CCIIV4 VAC NO PRSV 0.5 ML IM: CPT | Performed by: INTERNAL MEDICINE

## 2021-11-29 PROCEDURE — 99213 OFFICE O/P EST LOW 20 MIN: CPT | Performed by: INTERNAL MEDICINE

## 2021-11-29 PROCEDURE — G8427 DOCREV CUR MEDS BY ELIG CLIN: HCPCS | Performed by: INTERNAL MEDICINE

## 2021-11-29 RX ORDER — AMLODIPINE BESYLATE 5 MG/1
TABLET ORAL
Qty: 30 TABLET | Refills: 1 | Status: SHIPPED | OUTPATIENT
Start: 2021-11-29 | End: 2021-12-13 | Stop reason: SDUPTHER

## 2021-11-29 ASSESSMENT — PATIENT HEALTH QUESTIONNAIRE - PHQ9
1. LITTLE INTEREST OR PLEASURE IN DOING THINGS: 1
SUM OF ALL RESPONSES TO PHQ QUESTIONS 1-9: 1
SUM OF ALL RESPONSES TO PHQ QUESTIONS 1-9: 1
SUM OF ALL RESPONSES TO PHQ9 QUESTIONS 1 & 2: 1
2. FEELING DOWN, DEPRESSED OR HOPELESS: 0
SUM OF ALL RESPONSES TO PHQ QUESTIONS 1-9: 1

## 2021-11-29 NOTE — PROGRESS NOTES
Nuris Gore  Patient's  is 1965  Seen in office on 2021    SUBJECTIVE:  Dino Pike mi 60 y. o.year old male presents today   Chief Complaint   Patient presents with    Epistaxis     x 3     GI Problem     not having BM's on a regular basis, taking stool softner    Other     would like flu vaccine     Pt has epistaxis x 3. Small amount, no headache. BP is normal.  No sinus pressures. C/o constipation . 2 weeks . Had colonoscopy and was normal.  Took OTC stool softner. Taking medications regularly. No side effects noted. Review of Systems    OBJECTIVE: /72   Pulse 80   Temp 98.1 °F (36.7 °C) (Oral)   Resp 20   Wt 245 lb 6.4 oz (111.3 kg)   SpO2 92%   BMI 36.24 kg/m²     Wt Readings from Last 3 Encounters:   21 245 lb 6.4 oz (111.3 kg)   10/15/21 247 lb (112 kg)   21 248 lb (112.5 kg)        Patient was seen taking COVID-19 precautions. Face mask, gloves were used. Patient also wore facemask. GENERAL:  Alert, oriented, pleasant, in no apparent distress. HEENT:  Conjunctiva pink, no scleral icterus. ENT clear. Nose no bleeding. NECK: Supple. No jugular venous distention noted. No masses felt,  CARDIOVASCULAR:  Normal S1 and S2    PULMONARY:  No respiratory distress. No wheezes or rales. ABDOMEN:  Soft and non-tender,no masses  ororganomegaly. EXTREMITIES:  No cyanosis, clubbing, or significant edema. SKIN: Skin is warm and dry. NEUROLOGICAL:  Cranial nerves II through XII are grossly intact. IMPRESSION:    Encounter Diagnoses   Name Primary?  Epistaxis     Slow transit constipation        ASSESSMENT/PLAN:    Epistaxis : saline nasal spray. Instruction about epistaxis given  If not better : see ENT.  Pt will call in 2-3 weeks    Constipation : take miralax 17 gm dialy and when starts moving then qod prn    Keep appointment       Orders Placed This Encounter   Procedures    INFLUENZA, MDCK QUADV, 2 YRS AND OLDER, IM, PF, PREFILL SYR OR SDV, 0.5ML (FLUCELVAX QUADV, PF)       Mediations reviewed with the patient. Continue current medications. Appropriate prescriptions are addressed. After visit summeryprovided. Follow up as directed sooner if needed. Questions answered and patient verbalizes understanding. Call for any problems, questions, or concerns. Allergies   Allergen Reactions    Sulfa Antibiotics Rash     Current Outpatient Medications   Medication Sig Dispense Refill    amLODIPine (NORVASC) 5 MG tablet TAKE 1 TABLET BY MOUTH ONCE A DAY (GENERIC NORVASC) 30 tablet 1    albuterol sulfate  (90 Base) MCG/ACT inhaler INHALE 2 PUFFS INTO THE LUNGS EVERY 4 HOURS AS NEEDED FOR WHEEZING OR SHORTNESS OF BREATH 18 g 1    atorvastatin (LIPITOR) 40 MG tablet TAKE 1 TABLET BY MOUTH DAILY **STOP PRAVASTATIN** 30 tablet 5    carvedilol (COREG) 6.25 MG tablet Take 1 tablet by mouth 2 times daily 60 tablet 5    tamsulosin (FLOMAX) 0.4 MG capsule Take 0.4 mg by mouth daily      aspirin 81 MG EC tablet Take 1 tablet by mouth daily 30 tablet 5     No current facility-administered medications for this visit. Past Medical History:   Diagnosis Date    Abdominal aortic aneurysm (AAA) without rupture ([facility] [address] 4/18/2018    CT abdomen 4/2018 showed 3.2 cm AAA. It was normal on CT abdomen in 10/2016 4/8/2019 US : 4.5 x 4.3 cm size : increased significantly. Vascular consultation and recheck in one year. CT abdomen in 5/2019 : 3.3 cm : seen Dr Wing Knox. Recheck in one year     Acute gout of left ankle 2/2/2017    Resolved. Uric acid improved after d/c HCTZ    Bilateral carotid artery disease ([address] 11/4/2016    10/2016: Left ICA 50-69% and right ICA less than 50%  Dr Augustina Gaytan following that. Recheck in 6/17 per pt. But pt states it was cancelled. Pt will call and set appt Carotid doppler : 7/2018 :  bilateral 0-49 %    COPD, severe ([address] 8/9/2019    PFTs done 6/2019 : severe obstructive disease.      Coronary artery disease involving native coronary artery of native heart without angina pectoris 2016    CABG x 4 on 16: Dr Haydee Palma H/O colonoscopy 3-30-15    Dr. Seema Ferguson, rtoin 5 yrs  (polypectomy)    H/O Doppler ultrasound 2018    Carotid Doppler. Mild (0-49%) disease of bilateral internal carotid arteries, bilateral common carotid artery bulb to proximal ICA exhibits calcific plaque, left distal ICA end diastolic velocity is elevated but ratio is below 2.0, normal vertebral flow.  Hyperlipidemia     Hypertension     Incidental lung nodule 4 mm RUL. f/u in 10/15 2015    Ct shoulder showed 4 mm nodule in RUL in 4/15,CT chest 9/15 5mm. f/u in     Kidney stone on right side 2016    Large stone on right side on CT abdomen of 10/16    Nephrolithiasis     Right shoulder pain 3/17/2015    Pt had seen Dr. Stefanie Rivera and had arthrogram and CT shoulder. Showed possible tear of biceps tendon. Conservative treatment,     Squamous papilloma 2/23/15    benign left oropharynx/nasopharynx Dr Halina Orellana abuse disorder 2015    Pt has quit on 10/20/16     Past Surgical History:   Procedure Laterality Date    CARDIAC SURGERY  10/24/2016     CABG x4 SVG to RCA and Ramus, LIMA to LAD, DIAG sequ    COLONOSCOPY  2015    Dr. Seema Ferguson. 5yr    COLONOSCOPY      colonoscopy with polypectomy 2021 nathaly in 5 years.  HERNIA REPAIR      umbilical hernia    KIDNEY STONE SURGERY       Social History     Tobacco Use    Smoking status: Former Smoker     Packs/day: 1.00     Years: 1.00     Pack years: 1.00     Types: Cigarettes     Start date: 2017     Quit date: 2021     Years since quittin.7    Smokeless tobacco: Never Used   Substance Use Topics    Alcohol use:  Yes     Alcohol/week: 6.0 standard drinks     Types: 6 Standard drinks or equivalent per week     Comment: 3 or 4 beers per day, most days       LAB REVIEW:  CBC:   Lab Results   Component Value Date    WBC 7.9 10/22/2021    HGB 17.0 10/22/2021 HCT 49.7 10/22/2021     10/22/2021     Lipids:   Lab Results   Component Value Date    HDL 39 (L) 10/22/2021    LDLCALC 63 10/22/2021    LDLDIRECT 73 11/02/2018    TRIGLYCFAST 46 10/22/2021    CHOLFAST 111 10/22/2021     Renal:   Lab Results   Component Value Date    BUN 13 10/22/2021    CREATININE 0.7 10/22/2021     10/22/2021    K 4.4 10/22/2021    ALT 11 10/22/2021    AST 13 10/22/2021    GLUCOSE 102 10/22/2021    GLUF 117 02/01/2018     PT/INR:   Lab Results   Component Value Date    INR 1.07 10/24/2016     A1C:   Lab Results   Component Value Date    LABA1C 5.8 07/22/2020           Abigail Mayer MD, 11/29/2021 , 11:41 AM

## 2021-11-29 NOTE — PATIENT INSTRUCTIONS
Patient Education        Nosebleeds: Care Instructions  Your Care Instructions     Nosebleeds are common, especially if you have colds or allergies. Many things can cause a nosebleed. Some nosebleeds stop on their own with pressure. Others need packing. Some get cauterized (sealed). If you have gauze or other packing materials in your nose, you will need to follow up with your doctor to have the packing removed. You may need more treatment if you get nosebleeds a lot. The doctor has checked you carefully, but problems can develop later. If you notice any problems or new symptoms, get medical treatment right away. Follow-up care is a key part of your treatment and safety. Be sure to make and go to all appointments, and call your doctor if you are having problems. It's also a good idea to know your test results and keep a list of the medicines you take. How can you care for yourself at home? · If you get another nosebleed:  ? Sit up and tilt your head slightly forward. This keeps blood from going down your throat. ? Use your thumb and index finger to pinch your nose shut for 10 minutes. Use a clock. Do not check to see if the bleeding has stopped before the 10 minutes are up. If the bleeding has not stopped, pinch your nose shut for another 10 minutes. ? When the bleeding has stopped, try not to pick, rub, or blow your nose for 12 hours. Avoiding these things helps keep your nose from bleeding again. · If your doctor prescribed antibiotics, take them as directed. Do not stop taking them just because you feel better. You need to take the full course of antibiotics. To prevent nosebleeds  · Do not blow your nose too hard. · Try not to lift or strain after a nosebleed. · Raise your head on a pillow while you sleep. · Put a thin layer of a saline- or water-based nasal gel, such as NasoGel, inside your nose. Put it on the septum, which divides your nostrils.  This will prevent dryness that can cause nosebleeds. · Use a vaporizer or humidifier to add moisture to your bedroom. Follow the directions for cleaning the machine. · Do not use aspirin, ibuprofen (Advil, Motrin), or naproxen (Aleve) for 36 to 48 hours after a nosebleed unless your doctor tells you to. You can use acetaminophen (Tylenol) for pain relief. · Talk to your doctor about stopping any other medicines you are taking. Some medicines may make you more likely to get a nosebleed. · Do not use cold medicines or nasal sprays without first talking to your doctor. They can make your nose dry. When should you call for help? Call 911 anytime you think you may need emergency care. For example, call if:    · You passed out (lost consciousness). Call your doctor now or seek immediate medical care if:    · You get another nosebleed and your nose is still bleeding after you have applied pressure 3 times for 10 minutes each time (30 minutes total).     · There is a lot of blood running down the back of your throat even after you pinch your nose and tilt your head forward.     · You have a fever.     · You have sinus pain. Watch closely for changes in your health, and be sure to contact your doctor if:    · You get nosebleeds often, even if they stop.     · You do not get better as expected. Where can you learn more? Go to https://[employer].mphoria. org and sign in to your [employer] account. Enter S156 in the [employer] box to learn more about \"Nosebleeds: Care Instructions. \"     If you do not have an account, please click on the \"Sign Up Now\" link. Current as of: July 1, 2021               Content Version: 13.0  © 2299-1231 [employer]. Care instructions adapted under license by [facility] ([facility]). If you have questions about a medical condition or this instruction, always ask your healthcare professional. [place] [address] any warranty or liability for your use of this information.

## 2021-12-13 ENCOUNTER — APPOINTMENT (OUTPATIENT)
Dept: GENERAL RADIOLOGY | Age: 56
DRG: 189 | End: 2021-12-13
Payer: COMMERCIAL

## 2021-12-13 ENCOUNTER — APPOINTMENT (OUTPATIENT)
Dept: CT IMAGING | Age: 56
DRG: 189 | End: 2021-12-13
Payer: COMMERCIAL

## 2021-12-13 ENCOUNTER — TELEPHONE (OUTPATIENT)
Dept: INTERNAL MEDICINE CLINIC | Age: 56
End: 2021-12-13

## 2021-12-13 ENCOUNTER — HOSPITAL ENCOUNTER (INPATIENT)
Age: 56
LOS: 4 days | Discharge: HOME | DRG: 189 | End: 2021-12-17
Attending: EMERGENCY MEDICINE
Payer: COMMERCIAL

## 2021-12-13 DIAGNOSIS — R09.02 HYPOXIA: ICD-10-CM

## 2021-12-13 DIAGNOSIS — R06.89 HYPERCAPNIA: ICD-10-CM

## 2021-12-13 DIAGNOSIS — J44.1 COPD EXACERBATION (HCC): Primary | ICD-10-CM

## 2021-12-13 LAB
ADENOVIRUS DETECTION BY PCR: NOT DETECTED
ALBUMIN SERPL-MCNC: 3.8 GM/DL (ref 3.4–5)
ALP BLD-CCNC: 60 IU/L (ref 40–129)
ALT SERPL-CCNC: 10 U/L (ref 10–40)
ANION GAP SERPL CALCULATED.3IONS-SCNC: 21 MMOL/L (ref 4–16)
AST SERPL-CCNC: 15 IU/L (ref 15–37)
BASE EXCESS MIXED: 10.1 (ref 0–1.2)
BASE EXCESS: ABNORMAL (ref 0–3.3)
BASOPHILS ABSOLUTE: 0.1 K/CU MM
BASOPHILS RELATIVE PERCENT: 0.7 % (ref 0–1)
BILIRUB SERPL-MCNC: 0.5 MG/DL (ref 0–1)
BORDETELLA PARAPERTUSSIS BY PCR: NOT DETECTED
BORDETELLA PERTUSSIS PCR: NOT DETECTED
BUN BLDV-MCNC: 9 MG/DL (ref 6–23)
CALCIUM SERPL-MCNC: 8.8 MG/DL (ref 8.3–10.6)
CHLAMYDOPHILA PNEUMONIA PCR: NOT DETECTED
CHLORIDE BLD-SCNC: 96 MMOL/L (ref 99–110)
CO2 CONTENT: 41.8 MMOL/L (ref 19–24)
CO2: 22 MMOL/L (ref 21–32)
CORONAVIRUS 229E PCR: NOT DETECTED
CORONAVIRUS HKU1 PCR: NOT DETECTED
CORONAVIRUS NL63 PCR: NOT DETECTED
CORONAVIRUS OC43 PCR: NOT DETECTED
CREAT SERPL-MCNC: 0.7 MG/DL (ref 0.9–1.3)
DIFFERENTIAL TYPE: ABNORMAL
EOSINOPHILS ABSOLUTE: 0.1 K/CU MM
EOSINOPHILS RELATIVE PERCENT: 1.2 % (ref 0–3)
GFR AFRICAN AMERICAN: >60 ML/MIN/1.73M2
GFR NON-AFRICAN AMERICAN: >60 ML/MIN/1.73M2
GLUCOSE BLD-MCNC: 97 MG/DL (ref 70–99)
HCO3 VENOUS: 39.7 MMOL/L (ref 19–25)
HCT VFR BLD CALC: 55.6 % (ref 42–52)
HEMOGLOBIN: 17.5 GM/DL (ref 13.5–18)
HUMAN METAPNEUMOVIRUS PCR: NOT DETECTED
IMMATURE NEUTROPHIL %: 0.2 % (ref 0–0.43)
INFLUENZA A BY PCR: NOT DETECTED
INFLUENZA A H1 (2009) PCR: NOT DETECTED
INFLUENZA A H1 PANDEMIC PCR: NOT DETECTED
INFLUENZA A H3 PCR: NOT DETECTED
INFLUENZA B BY PCR: NOT DETECTED
LYMPHOCYTES ABSOLUTE: 2.6 K/CU MM
LYMPHOCYTES RELATIVE PERCENT: 28.9 % (ref 24–44)
MCH RBC QN AUTO: 30 PG (ref 27–31)
MCHC RBC AUTO-ENTMCNC: 31.5 % (ref 32–36)
MCV RBC AUTO: 95.4 FL (ref 78–100)
MONOCYTES ABSOLUTE: 1 K/CU MM
MONOCYTES RELATIVE PERCENT: 11 % (ref 0–4)
MYCOPLASMA PNEUMONIAE PCR: NOT DETECTED
O2 SAT, VEN: 95.1 % (ref 50–70)
PARAINFLUENZA 1 PCR: NOT DETECTED
PARAINFLUENZA 2 PCR: NOT DETECTED
PARAINFLUENZA 3 PCR: NOT DETECTED
PARAINFLUENZA 4 PCR: NOT DETECTED
PCO2, VEN: 69.1 MMHG (ref 38–52)
PDW BLD-RTO: 12.4 % (ref 11.7–14.9)
PH VENOUS: 7.37 (ref 7.32–7.42)
PLATELET # BLD: 224 K/CU MM (ref 140–440)
PMV BLD AUTO: 9.1 FL (ref 7.5–11.1)
PO2, VEN: 82.7 MMHG (ref 28–48)
POTASSIUM SERPL-SCNC: 5 MMOL/L (ref 3.5–5.1)
PRO-BNP: 405.4 PG/ML
RBC # BLD: 5.83 M/CU MM (ref 4.6–6.2)
RHINOVIRUS ENTEROVIRUS PCR: NOT DETECTED
RSV PCR: NOT DETECTED
SARS-COV-2: NOT DETECTED
SEGMENTED NEUTROPHILS ABSOLUTE COUNT: 5.1 K/CU MM
SEGMENTED NEUTROPHILS RELATIVE PERCENT: 58 % (ref 36–66)
SODIUM BLD-SCNC: 139 MMOL/L (ref 135–145)
SOURCE, BLOOD GAS: ABNORMAL
TOTAL IMMATURE NEUTOROPHIL: 0.02 K/CU MM
TOTAL PROTEIN: 5.8 GM/DL (ref 6.4–8.2)
TROPONIN T: <0.01 NG/ML
WBC # BLD: 8.8 K/CU MM (ref 4–10.5)

## 2021-12-13 PROCEDURE — 80053 COMPREHEN METABOLIC PANEL: CPT

## 2021-12-13 PROCEDURE — 6370000000 HC RX 637 (ALT 250 FOR IP): Performed by: EMERGENCY MEDICINE

## 2021-12-13 PROCEDURE — 0202U NFCT DS 22 TRGT SARS-COV-2: CPT

## 2021-12-13 PROCEDURE — 94640 AIRWAY INHALATION TREATMENT: CPT

## 2021-12-13 PROCEDURE — 84484 ASSAY OF TROPONIN QUANT: CPT

## 2021-12-13 PROCEDURE — 96374 THER/PROPH/DIAG INJ IV PUSH: CPT

## 2021-12-13 PROCEDURE — 1200000000 HC SEMI PRIVATE

## 2021-12-13 PROCEDURE — 2140000000 HC CCU INTERMEDIATE R&B

## 2021-12-13 PROCEDURE — 83880 ASSAY OF NATRIURETIC PEPTIDE: CPT

## 2021-12-13 PROCEDURE — 6360000004 HC RX CONTRAST MEDICATION: Performed by: EMERGENCY MEDICINE

## 2021-12-13 PROCEDURE — 85025 COMPLETE CBC W/AUTO DIFF WBC: CPT

## 2021-12-13 PROCEDURE — 93005 ELECTROCARDIOGRAM TRACING: CPT | Performed by: EMERGENCY MEDICINE

## 2021-12-13 PROCEDURE — 6360000002 HC RX W HCPCS: Performed by: EMERGENCY MEDICINE

## 2021-12-13 PROCEDURE — 99284 EMERGENCY DEPT VISIT MOD MDM: CPT

## 2021-12-13 PROCEDURE — 71045 X-RAY EXAM CHEST 1 VIEW: CPT

## 2021-12-13 PROCEDURE — 71275 CT ANGIOGRAPHY CHEST: CPT

## 2021-12-13 PROCEDURE — 6370000000 HC RX 637 (ALT 250 FOR IP): Performed by: NURSE PRACTITIONER

## 2021-12-13 RX ORDER — METHYLPREDNISOLONE SODIUM SUCCINATE 125 MG/2ML
125 INJECTION, POWDER, LYOPHILIZED, FOR SOLUTION INTRAMUSCULAR; INTRAVENOUS ONCE
Status: COMPLETED | OUTPATIENT
Start: 2021-12-13 | End: 2021-12-13

## 2021-12-13 RX ORDER — IPRATROPIUM BROMIDE AND ALBUTEROL SULFATE 2.5; .5 MG/3ML; MG/3ML
1 SOLUTION RESPIRATORY (INHALATION) ONCE
Status: COMPLETED | OUTPATIENT
Start: 2021-12-13 | End: 2021-12-13

## 2021-12-13 RX ORDER — AMLODIPINE BESYLATE 5 MG/1
5 TABLET ORAL DAILY
Status: DISCONTINUED | OUTPATIENT
Start: 2021-12-14 | End: 2021-12-17 | Stop reason: HOSPADM

## 2021-12-13 RX ORDER — TAMSULOSIN HYDROCHLORIDE 0.4 MG/1
0.4 CAPSULE ORAL DAILY
Qty: 30 CAPSULE | Refills: 3 | Status: SHIPPED | OUTPATIENT
Start: 2021-12-13 | End: 2022-01-25 | Stop reason: SDUPTHER

## 2021-12-13 RX ORDER — PREDNISONE 20 MG/1
40 TABLET ORAL DAILY
Status: DISCONTINUED | OUTPATIENT
Start: 2021-12-14 | End: 2021-12-14

## 2021-12-13 RX ORDER — ATORVASTATIN CALCIUM 40 MG/1
40 TABLET, FILM COATED ORAL NIGHTLY
Status: DISCONTINUED | OUTPATIENT
Start: 2021-12-13 | End: 2021-12-14

## 2021-12-13 RX ORDER — FAMOTIDINE 20 MG/1
20 TABLET, FILM COATED ORAL 2 TIMES DAILY PRN
Status: DISCONTINUED | OUTPATIENT
Start: 2021-12-13 | End: 2021-12-17 | Stop reason: HOSPADM

## 2021-12-13 RX ORDER — AMLODIPINE BESYLATE 5 MG/1
TABLET ORAL
Qty: 30 TABLET | Refills: 1 | Status: SHIPPED | OUTPATIENT
Start: 2021-12-13 | End: 2022-01-25 | Stop reason: SDUPTHER

## 2021-12-13 RX ORDER — DOXYCYCLINE HYCLATE 100 MG
100 TABLET ORAL EVERY 12 HOURS
Status: DISCONTINUED | OUTPATIENT
Start: 2021-12-13 | End: 2021-12-14

## 2021-12-13 RX ORDER — IPRATROPIUM BROMIDE AND ALBUTEROL SULFATE 2.5; .5 MG/3ML; MG/3ML
SOLUTION RESPIRATORY (INHALATION)
Status: DISPENSED
Start: 2021-12-13 | End: 2021-12-14

## 2021-12-13 RX ORDER — ACETAMINOPHEN 325 MG/1
650 TABLET ORAL EVERY 6 HOURS PRN
Status: DISCONTINUED | OUTPATIENT
Start: 2021-12-13 | End: 2021-12-17 | Stop reason: HOSPADM

## 2021-12-13 RX ORDER — ACETAMINOPHEN 650 MG/1
650 SUPPOSITORY RECTAL EVERY 6 HOURS PRN
Status: DISCONTINUED | OUTPATIENT
Start: 2021-12-13 | End: 2021-12-17 | Stop reason: HOSPADM

## 2021-12-13 RX ORDER — ASPIRIN 81 MG/1
81 TABLET ORAL DAILY
Status: DISCONTINUED | OUTPATIENT
Start: 2021-12-14 | End: 2021-12-17 | Stop reason: HOSPADM

## 2021-12-13 RX ORDER — CARVEDILOL 6.25 MG/1
6.25 TABLET ORAL 2 TIMES DAILY
Qty: 60 TABLET | Refills: 5 | Status: SHIPPED | OUTPATIENT
Start: 2021-12-13 | End: 2022-01-25 | Stop reason: SDUPTHER

## 2021-12-13 RX ORDER — NICOTINE 21 MG/24HR
1 PATCH, TRANSDERMAL 24 HOURS TRANSDERMAL DAILY
Status: DISCONTINUED | OUTPATIENT
Start: 2021-12-14 | End: 2021-12-17 | Stop reason: HOSPADM

## 2021-12-13 RX ORDER — ALBUTEROL SULFATE 90 UG/1
2 AEROSOL, METERED RESPIRATORY (INHALATION) 4 TIMES DAILY
Status: DISCONTINUED | OUTPATIENT
Start: 2021-12-13 | End: 2021-12-17 | Stop reason: HOSPADM

## 2021-12-13 RX ORDER — POLYETHYLENE GLYCOL 3350 17 G/17G
17 POWDER, FOR SOLUTION ORAL DAILY PRN
Status: DISCONTINUED | OUTPATIENT
Start: 2021-12-13 | End: 2021-12-17 | Stop reason: HOSPADM

## 2021-12-13 RX ORDER — IPRATROPIUM BROMIDE AND ALBUTEROL SULFATE 2.5; .5 MG/3ML; MG/3ML
1 SOLUTION RESPIRATORY (INHALATION) EVERY 4 HOURS PRN
Status: DISCONTINUED | OUTPATIENT
Start: 2021-12-13 | End: 2021-12-17 | Stop reason: HOSPADM

## 2021-12-13 RX ORDER — CARVEDILOL 6.25 MG/1
6.25 TABLET ORAL 2 TIMES DAILY
Status: DISCONTINUED | OUTPATIENT
Start: 2021-12-13 | End: 2021-12-15

## 2021-12-13 RX ADMIN — METHYLPREDNISOLONE SODIUM SUCCINATE 125 MG: 125 INJECTION, POWDER, FOR SOLUTION INTRAMUSCULAR; INTRAVENOUS at 21:03

## 2021-12-13 RX ADMIN — IOPAMIDOL 75 ML: 755 INJECTION, SOLUTION INTRAVENOUS at 22:12

## 2021-12-13 RX ADMIN — ALBUTEROL SULFATE 2 PUFF: 108 INHALANT RESPIRATORY (INHALATION) at 23:55

## 2021-12-13 RX ADMIN — IPRATROPIUM BROMIDE AND ALBUTEROL SULFATE 1 AMPULE: 2.5; .5 SOLUTION RESPIRATORY (INHALATION) at 21:43

## 2021-12-14 LAB
ANION GAP SERPL CALCULATED.3IONS-SCNC: 6 MMOL/L (ref 4–16)
BASOPHILS ABSOLUTE: 0 K/CU MM
BASOPHILS RELATIVE PERCENT: 0.1 % (ref 0–1)
BUN BLDV-MCNC: 9 MG/DL (ref 6–23)
CALCIUM SERPL-MCNC: 9.1 MG/DL (ref 8.3–10.6)
CHLORIDE BLD-SCNC: 93 MMOL/L (ref 99–110)
CO2: 38 MMOL/L (ref 21–32)
CREAT SERPL-MCNC: 0.8 MG/DL (ref 0.9–1.3)
DIFFERENTIAL TYPE: ABNORMAL
EKG ATRIAL RATE: 76 BPM
EKG DIAGNOSIS: NORMAL
EKG P AXIS: 63 DEGREES
EKG P-R INTERVAL: 148 MS
EKG Q-T INTERVAL: 380 MS
EKG QRS DURATION: 94 MS
EKG QTC CALCULATION (BAZETT): 427 MS
EKG R AXIS: 79 DEGREES
EKG T AXIS: 60 DEGREES
EKG VENTRICULAR RATE: 76 BPM
EOSINOPHILS ABSOLUTE: 0 K/CU MM
EOSINOPHILS RELATIVE PERCENT: 0 % (ref 0–3)
GFR AFRICAN AMERICAN: >60 ML/MIN/1.73M2
GFR NON-AFRICAN AMERICAN: >60 ML/MIN/1.73M2
GLUCOSE BLD-MCNC: 154 MG/DL (ref 70–99)
HCT VFR BLD CALC: 58.3 % (ref 42–52)
HEMOGLOBIN: 18 GM/DL (ref 13.5–18)
IMMATURE NEUTROPHIL %: 0.6 % (ref 0–0.43)
LV EF: 53 %
LVEF MODALITY: NORMAL
LYMPHOCYTES ABSOLUTE: 0.8 K/CU MM
LYMPHOCYTES RELATIVE PERCENT: 11.3 % (ref 24–44)
MCH RBC QN AUTO: 29.7 PG (ref 27–31)
MCHC RBC AUTO-ENTMCNC: 30.9 % (ref 32–36)
MCV RBC AUTO: 96 FL (ref 78–100)
MONOCYTES ABSOLUTE: 0.1 K/CU MM
MONOCYTES RELATIVE PERCENT: 1.2 % (ref 0–4)
PDW BLD-RTO: 12.2 % (ref 11.7–14.9)
PLATELET # BLD: 234 K/CU MM (ref 140–440)
PMV BLD AUTO: 9 FL (ref 7.5–11.1)
POTASSIUM SERPL-SCNC: 4.7 MMOL/L (ref 3.5–5.1)
PRO-BNP: 261.6 PG/ML
PROCALCITONIN: 0.03
RBC # BLD: 6.07 M/CU MM (ref 4.6–6.2)
SEGMENTED NEUTROPHILS ABSOLUTE COUNT: 5.9 K/CU MM
SEGMENTED NEUTROPHILS RELATIVE PERCENT: 86.8 % (ref 36–66)
SODIUM BLD-SCNC: 137 MMOL/L (ref 135–145)
TOTAL IMMATURE NEUTOROPHIL: 0.04 K/CU MM
TROPONIN T: <0.01 NG/ML
WBC # BLD: 6.8 K/CU MM (ref 4–10.5)

## 2021-12-14 PROCEDURE — 84484 ASSAY OF TROPONIN QUANT: CPT

## 2021-12-14 PROCEDURE — 80048 BASIC METABOLIC PNL TOTAL CA: CPT

## 2021-12-14 PROCEDURE — 2140000000 HC CCU INTERMEDIATE R&B

## 2021-12-14 PROCEDURE — 85025 COMPLETE CBC W/AUTO DIFF WBC: CPT

## 2021-12-14 PROCEDURE — 93306 TTE W/DOPPLER COMPLETE: CPT

## 2021-12-14 PROCEDURE — 6370000000 HC RX 637 (ALT 250 FOR IP): Performed by: INTERNAL MEDICINE

## 2021-12-14 PROCEDURE — 94640 AIRWAY INHALATION TREATMENT: CPT

## 2021-12-14 PROCEDURE — 6370000000 HC RX 637 (ALT 250 FOR IP): Performed by: NURSE PRACTITIONER

## 2021-12-14 PROCEDURE — 36415 COLL VENOUS BLD VENIPUNCTURE: CPT

## 2021-12-14 PROCEDURE — 93010 ELECTROCARDIOGRAM REPORT: CPT | Performed by: INTERNAL MEDICINE

## 2021-12-14 PROCEDURE — 6360000002 HC RX W HCPCS: Performed by: NURSE PRACTITIONER

## 2021-12-14 PROCEDURE — 1200000000 HC SEMI PRIVATE

## 2021-12-14 PROCEDURE — 2500000003 HC RX 250 WO HCPCS: Performed by: NURSE PRACTITIONER

## 2021-12-14 PROCEDURE — 83880 ASSAY OF NATRIURETIC PEPTIDE: CPT

## 2021-12-14 PROCEDURE — 84145 PROCALCITONIN (PCT): CPT

## 2021-12-14 PROCEDURE — 2580000003 HC RX 258: Performed by: NURSE PRACTITIONER

## 2021-12-14 RX ORDER — GUAIFENESIN 600 MG/1
600 TABLET, EXTENDED RELEASE ORAL 2 TIMES DAILY
Status: DISCONTINUED | OUTPATIENT
Start: 2021-12-14 | End: 2021-12-15

## 2021-12-14 RX ORDER — METHYLPREDNISOLONE SODIUM SUCCINATE 40 MG/ML
40 INJECTION, POWDER, LYOPHILIZED, FOR SOLUTION INTRAMUSCULAR; INTRAVENOUS EVERY 6 HOURS
Status: DISCONTINUED | OUTPATIENT
Start: 2021-12-14 | End: 2021-12-17 | Stop reason: HOSPADM

## 2021-12-14 RX ORDER — SODIUM CHLORIDE 9 MG/ML
250 INJECTION, SOLUTION INTRAVENOUS PRN
Status: DISCONTINUED | OUTPATIENT
Start: 2021-12-14 | End: 2021-12-17 | Stop reason: HOSPADM

## 2021-12-14 RX ADMIN — ALBUTEROL SULFATE 2 PUFF: 108 INHALANT RESPIRATORY (INHALATION) at 14:58

## 2021-12-14 RX ADMIN — AMLODIPINE BESYLATE 5 MG: 5 TABLET ORAL at 08:40

## 2021-12-14 RX ADMIN — ALBUTEROL SULFATE 2 PUFF: 108 INHALANT RESPIRATORY (INHALATION) at 19:37

## 2021-12-14 RX ADMIN — ACETAMINOPHEN 650 MG: 325 TABLET ORAL at 08:44

## 2021-12-14 RX ADMIN — IPRATROPIUM BROMIDE 2 PUFF: 17 AEROSOL, METERED RESPIRATORY (INHALATION) at 19:37

## 2021-12-14 RX ADMIN — CARVEDILOL 6.25 MG: 6.25 TABLET, FILM COATED ORAL at 20:14

## 2021-12-14 RX ADMIN — ALBUTEROL SULFATE 2 PUFF: 108 INHALANT RESPIRATORY (INHALATION) at 11:36

## 2021-12-14 RX ADMIN — CARVEDILOL 6.25 MG: 6.25 TABLET, FILM COATED ORAL at 01:35

## 2021-12-14 RX ADMIN — DEXTROSE MONOHYDRATE 100 MG: 5 INJECTION INTRAVENOUS at 15:26

## 2021-12-14 RX ADMIN — PREDNISONE 40 MG: 20 TABLET ORAL at 08:40

## 2021-12-14 RX ADMIN — METHYLPREDNISOLONE SODIUM SUCCINATE 40 MG: 40 INJECTION, POWDER, FOR SOLUTION INTRAMUSCULAR; INTRAVENOUS at 15:22

## 2021-12-14 RX ADMIN — GUAIFENESIN 600 MG: 600 TABLET ORAL at 20:14

## 2021-12-14 RX ADMIN — ALBUTEROL SULFATE 2 PUFF: 108 INHALANT RESPIRATORY (INHALATION) at 07:52

## 2021-12-14 RX ADMIN — SODIUM CHLORIDE 250 ML: 9 INJECTION, SOLUTION INTRAVENOUS at 15:25

## 2021-12-14 RX ADMIN — IPRATROPIUM BROMIDE 2 PUFF: 17 AEROSOL, METERED RESPIRATORY (INHALATION) at 14:58

## 2021-12-14 RX ADMIN — ENOXAPARIN SODIUM 40 MG: 100 INJECTION SUBCUTANEOUS at 08:41

## 2021-12-14 RX ADMIN — METHYLPREDNISOLONE SODIUM SUCCINATE 40 MG: 40 INJECTION, POWDER, FOR SOLUTION INTRAMUSCULAR; INTRAVENOUS at 20:14

## 2021-12-14 RX ADMIN — DOXYCYCLINE HYCLATE 100 MG: 100 TABLET, COATED ORAL at 01:35

## 2021-12-14 RX ADMIN — ASPIRIN 81 MG: 81 TABLET, COATED ORAL at 08:40

## 2021-12-14 RX ADMIN — CARVEDILOL 6.25 MG: 6.25 TABLET, FILM COATED ORAL at 08:40

## 2021-12-14 ASSESSMENT — PAIN SCALES - GENERAL
PAINLEVEL_OUTOF10: 0
PAINLEVEL_OUTOF10: 1
PAINLEVEL_OUTOF10: 0
PAINLEVEL_OUTOF10: 2
PAINLEVEL_OUTOF10: 0
PAINLEVEL_OUTOF10: 0
PAINLEVEL_OUTOF10: 2
PAINLEVEL_OUTOF10: 0

## 2021-12-14 ASSESSMENT — PAIN DESCRIPTION - DESCRIPTORS: DESCRIPTORS: ACHING

## 2021-12-14 ASSESSMENT — PAIN DESCRIPTION - PROGRESSION: CLINICAL_PROGRESSION: GRADUALLY WORSENING

## 2021-12-14 ASSESSMENT — PAIN DESCRIPTION - LOCATION: LOCATION: BACK

## 2021-12-14 ASSESSMENT — PAIN DESCRIPTION - ORIENTATION: ORIENTATION: LOWER

## 2021-12-14 ASSESSMENT — ENCOUNTER SYMPTOMS
EYES NEGATIVE: 1
COUGH: 1
GASTROINTESTINAL NEGATIVE: 1
SHORTNESS OF BREATH: 1

## 2021-12-14 ASSESSMENT — PAIN DESCRIPTION - ONSET: ONSET: GRADUAL

## 2021-12-14 ASSESSMENT — PAIN - FUNCTIONAL ASSESSMENT: PAIN_FUNCTIONAL_ASSESSMENT: PREVENTS OR INTERFERES SOME ACTIVE ACTIVITIES AND ADLS

## 2021-12-14 ASSESSMENT — PAIN DESCRIPTION - FREQUENCY: FREQUENCY: INTERMITTENT

## 2021-12-14 ASSESSMENT — PAIN DESCRIPTION - PAIN TYPE: TYPE: CHRONIC PAIN

## 2021-12-14 NOTE — ED PROVIDER NOTES
EMERGENCY DEPARTMENT ENCOUNTER      CHIEF COMPLAINT:   Shortness of breath  Fatigue    CRITICAL CARE NOTE:  There was a high probability of clinically significant life-threatening deterioration of the patient's condition requiring my urgent intervention. Total critical care time is 40 minutes  This includes vital sign monitoring, pulse oximetry monitoring, telemetry monitoring, clinical response to the IV medications, reviewing the nursing notes, consultation time, dictation/documetation time. (This time excludes time spent performing procedures). HPI: Nomi Amaral is a 60 y.o. male with a past medical history of COPD who presents to the Emergency Department complaining of shortness of breath and fatigue. The patient states that he had difficulty staying awake and slept most of the day yesterday. 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 He has been mildly short of breath. He states that he has had a productive cough but that this is chronic and unchanged for him. He denies hemoptysis. Symptoms have been intermittent. There are no exacerbating or relieving factors. The patient denies current tobacco use. He does not use oxygen at home. There is no known history of DVT or PE. The patient denies leg pain or leg swelling. The patient denies fevers, chills, neck pain, chest pain, hemoptysis, abdominal pain, nausea, vomiting, numbness, tingling, weakness, or any other complaints.     REVIEW OF SYSTEMS:  CONSTITUTIONAL:  Denies fever, chills, weight loss, complains of fatigue  EYES:  Denies photophobia or discharge  ENT:  Denies sore throat or ear pain  CARDIOVASCULAR: Denies chest pain or palpitations  RESPIRATORY:  Complains of cough and shortness of breath  GI:  Denies abdominal pain, nausea, vomiting, or diarrhea  MUSCULOSKELETAL:  Denies back pain  SKIN:  No rash  NEUROLOGIC:  Denies headache, focal weakness or sensory changes  All systems negative except as marked. \"Remaining review of systems reviewed and negative. I have reviewed the nursing triage documentation and agree unless otherwise noted below. \"      PAST MEDICAL HISTORY:   Past Medical History:   Diagnosis Date    Abdominal aortic aneurysm (AAA) without rupture ([address] 4/18/2018    CT abdomen 4/2018 showed 3.2 cm AAA. It was normal on CT abdomen in 10/2016 4/8/2019 US : 4.5 x 4.3 cm size : increased significantly. Vascular consultation and recheck in one year. CT abdomen in 5/2019 : 3.3 cm : seen Dr Lottie Pantoja. Recheck in one year     Acute gout of left ankle 2/2/2017    Resolved. Uric acid improved after d/c HCTZ    Bilateral carotid artery disease ([address] 11/4/2016    10/2016: Left ICA 50-69% and right ICA less than 50%  Dr Savi Traore following that. Recheck in 6/17 per pt. But pt states it was cancelled. Pt will call and set appt Carotid doppler : 7/2018 :  bilateral 0-49 %    COPD, severe ([address] 8/9/2019    PFTs done 6/2019 : severe obstructive disease.  Coronary artery disease involving native coronary artery of native heart without angina pectoris 11/04/2016    CABG x 4 on 9/24/16: Dr Beau Fernández H/O colonoscopy 3-30-15    Dr. Zenon Osei, rtoin 5 yrs  (polypectomy)    H/O Doppler ultrasound 07/19/2018    Carotid Doppler. Mild (0-49%) disease of bilateral internal carotid arteries, bilateral common carotid artery bulb to proximal ICA exhibits calcific plaque, left distal ICA end diastolic velocity is elevated but ratio is below 2.0, normal vertebral flow.  Hyperlipidemia     Hypertension     Incidental lung nodule 4 mm RUL. f/u in 10/15 4/22/2015    Ct shoulder showed 4 mm nodule in RUL in 4/15,CT chest 9/15 5mm. f/u in 4/16    Kidney stone on right side 9/29/2016    Large stone on right side on CT abdomen of 10/16    Nephrolithiasis     Right shoulder pain 3/17/2015    Pt had seen Dr. Ericka Ardon and had arthrogram and CT shoulder.  Showed possible tear of biceps tendon. Conservative treatment,     Squamous papilloma 2/23/15    benign left oropharynx/nasopharynx Dr Navya Lepe abuse disorder 1/20/2015    Pt has quit on 10/20/16       CURRENT MEDICATIONS:   Home medications reviewed. SURGICAL HISTORY:   Past Surgical History:   Procedure Laterality Date    CARDIAC SURGERY  10/24/2016     CABG x4 SVG to RCA and Ramus, LIMA to LAD, DIAG sequ    COLONOSCOPY  03/2015    Dr. Parrish Newton. 5yr    COLONOSCOPY      colonoscopy with polypectomy 6-7-2021 nathaly in 5 years.  HERNIA REPAIR      umbilical hernia    KIDNEY STONE SURGERY         FAMILY HISTORY:   Family History   Problem Relation Age of Onset    Cancer Mother         colon    Diabetes Mother     Coronary Art Dis Father         CABG age 72       SOCIAL HISTORY:   Social History     Socioeconomic History    Marital status:      Spouse name: Not on file    Number of children: 1    Years of education: 15    Highest education level: Not on file   Occupational History     Comment: Heavy equipment   Tobacco Use    Smoking status: Current Every Day Smoker     Packs/day: 1.00     Years: 1.00     Pack years: 1.00     Types: Cigarettes     Start date: 7/6/2017    Smokeless tobacco: Never Used   Substance and Sexual Activity    Alcohol use: Yes     Alcohol/week: 6.0 standard drinks     Types: 6 Standard drinks or equivalent per week     Comment: 3 or 4 beers per day, most days    Drug use: Yes     Types: Marijuana Champ Cue)     Comment: 2 or 3 times per week    Sexual activity: Yes     Partners: Female   Other Topics Concern    Not on file   Social History Narrative    Not on file     Social Determinants of Health     Financial Resource Strain:     Difficulty of Paying Living Expenses: Not on file   Food Insecurity:     Worried About Running Out of Food in the Last Year: Not on file    Mauricio of Food in the Last Year: Not on file   Transportation Needs:     Lack of Transportation (Medical):  Not on file    Lack of Transportation (Non-Medical): Not on file   Physical Activity:     Days of Exercise per Week: Not on file    Minutes of Exercise per Session: Not on file   Stress:     Feeling of Stress : Not on file   Social Connections:     Frequency of Communication with Friends and Family: Not on file    Frequency of Social Gatherings with Friends and Family: Not on file    Attends [employer] Services: Not on file    Active Member of [address] [place] [address] or Organizations: Not on file    Attends Club or Organization Meetings: Not on file    Marital Status: Not on file   Intimate Partner Violence:     Fear of Current or Ex-Partner: Not on file    Emotionally Abused: Not on file    Physically Abused: Not on file    Sexually Abused: Not on file   Housing Stability:     Unable to Pay for Housing in the Last Year: Not on file    Number of [place] in the Last Year: Not on file    Unstable Housing in the Last Year: Not on file       ALLERGIES: Sulfa antibiotics    PHYSICAL EXAM:  VITAL SIGNS:   ED Triage Vitals [12/13/21 1850]   Enc Vitals Group      BP (!) 185/93      Pulse 78      Resp 20      Temp 96.3 °F (35.7 °C)      Temp Source Oral      SpO2 (!) 60 %      Weight 245 lb (111.1 kg)      Height       Head Circumference       Peak Flow       Pain Score       Pain Loc       Pain Edu? Excl. in [address]? Constitutional:  Non-toxic appearance  HENT: Normocephalic, Atraumatic, Bilateral external ears normal, Oropharynx moist, No oral exudates, Nose normal.  Eyes:  PERRL, Conjunctiva normal, No discharge. Neck: Normal range of motion, No tenderness, Supple, No stridor, No lymphadenopathy. Cardiovascular:  Normal heart rate, Normal rhythm  Pulmonary/Chest: Lungs are diminished to auscultation bilaterally, no wheezing, no significant increased work of breathing  Abdomen:   Bowel sounds normal, Soft, No tenderness, No masses, No pulsatile masses  Back:  No tenderness, No CVA tenderness  Extremities:  Normal range of motion, Intact distal pulses, No edema, No tenderness  Skin:  Warm, Dry, No erythema, No rash    EKG Interpretation  Interpreted by me  Compared to 2/15/2019  Rhythm: normal sinus  Rate: normal 76  Axis: normal  Ectopy: none  Conduction: PVCs  ST Segments: no acute change  T Waves: no acute change  Q Waves: none  Clinical Impression: normal sinus rhythm, occasional PVCs, no acute change    Cardiac Monitor Strip Interpretation  Interpreted by me  Monitor strip interpreted for greater than 10 seconds  Rhythm: normal sinus  Rate: normal  Ectopy: none  ST Segments: normal      Radiology / Procedures:  CTA PULMONARY W CONTRAST (Final result)  Result time 12/13/21 22:49:51  Final result by Mell Collier MD (12/13/21 22:49:51)                Impression:    No evidence of pulmonary embolism or acute pulmonary abnormality. RECOMMENDATIONS:   Unavailable             Narrative:    EXAMINATION:   CTA OF THE CHEST 12/13/2021 10:02 pm     TECHNIQUE:   CTA of the chest was performed after the administration of intravenous   contrast.  Multiplanar reformatted images are provided for review.  MIP   images are provided for review. Dose modulation, iterative reconstruction,   and/or weight based adjustment of the mA/kV was utilized to reduce the   radiation dose to as low as reasonably achievable. COMPARISON:   None. HISTORY:   ORDERING SYSTEM PROVIDED HISTORY: Hypoxia   TECHNOLOGIST PROVIDED HISTORY:   Reason for exam:->Hypoxia   Decision Support Exception - unselect if not a suspected or confirmed   emergency medical condition->Emergency Medical Condition (MA)   Reason for Exam: Hypoxia, PE?, 75 ml isovue 370   Additional signs and symptoms: Hypoxia, PE?, 75 ml isovue 370     FINDINGS:   Pulmonary Arteries: Pulmonary arteries are adequately opacified for   evaluation.  No evidence of intraluminal filling defect to suggest pulmonary   embolism.  Main pulmonary artery is normal in caliber.      Mediastinum: No evidence of mediastinal lymphadenopathy.  The heart and   pericardium demonstrate no acute abnormality.  There is no acute abnormality   of the thoracic aorta. Lungs/pleura: The lungs are without acute process.  No focal consolidation or   pulmonary edema.  No evidence of pleural effusion or pneumothorax. Upper Abdomen: Limited images of the upper abdomen are unremarkable. Soft Tissues/Bones: Mild superior endplate compression of the T5, T6, and T8   vertebral bodies, most likely chronic in nature.                         XR CHEST PORTABLE (Final result)  Result time 12/13/21 19:49:48  Final result by Juan Ruiz MD (12/13/21 19:49:48)                Impression:    No acute process. Narrative:    EXAMINATION:   ONE XRAY VIEW OF THE CHEST     12/13/2021 7:30 pm     COMPARISON:   Chest radiograph 02/15/2019     HISTORY:   ORDERING SYSTEM PROVIDED HISTORY: shortness of breath   TECHNOLOGIST PROVIDED HISTORY:   Reason for exam:->shortness of breath   Reason for Exam: shortness of breath   Additional signs and symptoms: shortness of breath     FINDINGS:   The lungs are without acute focal process.  There is no effusion or   pneumothorax. The cardiomediastinal silhouette is without acute process. Status post CABG with median sternotomy the osseous structures are without   acute process.                Labs Reviewed   CBC WITH AUTO DIFFERENTIAL - Abnormal; Notable for the following components:       Result Value    Hematocrit 55.6 (*)     MCHC 31.5 (*)     Monocytes % 11.0 (*)     All other components within normal limits   COMPREHENSIVE METABOLIC PANEL W/ REFLEX TO MG FOR LOW K - Abnormal; Notable for the following components:    Chloride 96 (*)     CREATININE 0.7 (*)     Total Protein 5.8 (*)     Anion Gap 21 (*)     All other components within normal limits   BRAIN NATRIURETIC PEPTIDE - Abnormal; Notable for the following components:    Pro-.4 (*)     All other components within normal limits   CBC WITH AUTO DIFFERENTIAL - Abnormal; Notable for the following components:    Hematocrit 58.3 (*)     MCHC 30.9 (*)     Segs Relative 86.8 (*)     Lymphocytes % 11.3 (*)     Immature Neutrophil % 0.6 (*)     All other components within normal limits   RESPIRATORY PANEL, MOLECULAR, WITH COVID-19     Venous blood gas shows a pH of 7.36, PCO2 of 69.1, PO2 of 82.7, a bicarb of 39.7, base excess of 10.1, O2 saturation of 95.1    ED COURSE & MEDICAL DECISION MAKING:  Pertinent Labs & Imaging studies reviewed. (See chart for details)  On exam, the patient is afebrile and does not appear toxic. Oxygenation saturation is in the 60s on room air. He is placed on 4 L of oxygen by nasal cannula with improvement. He is hypertensive on arrival with a known history of hypertension and is asymptomatic. This improves without treatment. He is otherwise hemodynamically stable and neurologically intact. EKG shows a normal sinus rhythm with no ST elevation or depression. Labs are obtained and venous blood gas is significant for respiratory acidosis and hypercapnia. Chest x-ray is negative. CTA of the chest shows no evidence of pulmonary embolism or acute pulmonary abnormality. The patient was treated with IV Solu-Medrol and inhalers. I suspect that the patient  is having a COPD exacerbation with hypoxia and hypercapnia. . I have a low suspicion for PTX, PE, STEMI, pneumonia, flash pulmonary edema, persistent hypoxia or sepsis. I recommended admission to the hospital and the patient was agreeable. I discussed the case with. the hospitalist who will admit the patient for further treatment and care. The patient is currently in stable condition awaiting admission. Clinical Impression:  1. COPD exacerbation ([address]    2.  Hypoxia        Comment: Please note this report has been produced using speech recognition software and may contain errors related to that system including errors in grammar, punctuation, and spelling, as well as words and phrases that may be inappropriate. If there are any questions or concerns please feel free to contact the dictating provider for clarification.         Kajal Raymond MD  12/14/21 [id number]

## 2021-12-14 NOTE — H&P
History and Physical    This patient was seen and examined autonomously  A hospitalist attending physician was available for questions/consultation as needed. Name:  Daphne Fuentes /Age/Sex: 1965  (60 y.o. male)   MRN & CSN:  [id number] & [id number] Admission Date/Time: 2021  6:49 PM   Location:   PCP: Corey Dietrich MD       Hospital Day: 1            Assessment and Plan:   Daphne Fuentes is a 60 y.o. male who presents with  Shortness of Breath (SOB O2 stat at60% room air. pt put on 4L Hx COPD and smoker  FLu shot last wk)      Present on Admission:   COPD exacerbation ([address]     Acute on chronic respiratory failure with hypoxia acute r/t  COPD. ABG: please see ED providers note as lab is not populating. Oxygen saturation upon presentation sustained in 60%  and requiring supplemental oxygen 4 LNC eventually titrated to 2 L NC at 98%. PO abx  for empiric coverage will customize pending cultures/ procal  WBC on admission was 8.8 afbrile              Pending cultures- sputum/ viral /blood            given   IV solumedrol in ED will start on PO steroids in am.              Respiratory interventions- supplemental oxygen, continuous pulse oximetry, evelyn treatments. Resp pan. NEG    Chest x-ray:  The lungs are without acute focal process.  There is no effusion or   pneumothorax. The cardiomediastinal silhouette is without acute process. Status post CABG with median sternotomy the osseous structures are without   acute process.         CTA:  Impression   No evidence of pulmonary embolism or acute pulmonary abnormality. Nicotine dep: replacement and educated on quitting/ limiting tabaco use. Patient would like to speak with day time hospitalist about code status change from full code to [facility] he would like time to thing about this. He would also like his wife to be his medical POA. Other concerns/ Hx of   Ess.  HTN : con home meds watch vital trends  Hyperlipidemia: con home meds  CAD with stent placement: con carla home BB/ trop neg con to watch trends  EKG in ED: NSR with PVCs BNP was 405.4 in ED will watch trends Patient reports not following up with cardiology and does have an extensive heart history. Will order ECHO and if needed while inpatient may need cardio cons. If not should encourage patient to follow up outpatient. Patient case discussed with ED provider    Diet No diet orders on file   DVT Prophylaxis [x] Lovenox, []  Heparin, [] SCDs, [] Ambulation  [] Long term AC   GI Prophylaxis [] PPI,  [] H2 Blocker,  [] Carafate,  [x] Diet/Tube Feeds   Code Status Full code   Disposition Admit to [facility]     -Patient assessment and plan discussed and reviewed with admitting physician: Dani Elliott MD.       History of Present Illness:     Chief Complaint: Shortness of Breath (SOB O2 stat at60% room air. pt put on 4L Hx COPD and smoker  FLu shot last wk)      Krysten Hanna is a 60 y.o. male who presents with increased fatigue over the last several days. His wife took his pulse ox and reported it was around 70% although she was concerned the patient still went to work. 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 He reports chronic hx of COPD still smokes, and has ocassional chronic chest pain especially when coughing that is mid sternal and does not radiate. He says he has noticed over the last week he is more SOB when laying flat or walking. He denied N/V/D fevers, He reprots his sputum color change recently to brown but that he has always coughed up sputum for years. He reports extensive heart hx including bypass but has not followed up with cardio in a long time time, he says they forgot about him. He also reports hx of HTN and high cholesterol and COPD for which he takes medication daily. History obtained from patient and wife. Eri Gagnon .Review of Systems   Constitutional: Positive for fatigue. Resolved. Uric acid improved after d/c HCTZ    Bilateral carotid artery disease ([address] 11/4/2016    10/2016: Left ICA 50-69% and right ICA less than 50%  Dr Thu Lo following that. Recheck in 6/17 per pt. But pt states it was cancelled. Pt will call and set appt Carotid doppler : 7/2018 :  bilateral 0-49 %    COPD, severe ([address] 8/9/2019    PFTs done 6/2019 : severe obstructive disease.  Coronary artery disease involving native coronary artery of native heart without angina pectoris 11/04/2016    CABG x 4 on 9/24/16: Dr Church Born H/O colonoscopy 3-30-15    Dr. Marques Anaya, rtoin 5 yrs  (polypectomy)    H/O Doppler ultrasound 07/19/2018    Carotid Doppler. Mild (0-49%) disease of bilateral internal carotid arteries, bilateral common carotid artery bulb to proximal ICA exhibits calcific plaque, left distal ICA end diastolic velocity is elevated but ratio is below 2.0, normal vertebral flow.  Hyperlipidemia     Hypertension     Incidental lung nodule 4 mm RUL. f/u in 10/15 4/22/2015    Ct shoulder showed 4 mm nodule in RUL in 4/15,CT chest 9/15 5mm. f/u in 4/16    Kidney stone on right side 9/29/2016    Large stone on right side on CT abdomen of 10/16    Nephrolithiasis     Right shoulder pain 3/17/2015    Pt had seen Dr. Cinthia Hicks and had arthrogram and CT shoulder. Showed possible tear of biceps tendon. Conservative treatment,     Squamous papilloma 2/23/15    benign left oropharynx/nasopharynx Dr Екатерина Dominguez abuse disorder 1/20/2015    Pt has quit on 10/20/16       Past Surgical  History:    has a past surgical history that includes Kidney stone surgery; hernia repair; Colonoscopy (03/2015); Cardiac surgery (10/24/2016); and Colonoscopy. Social History:    FAM HX: Reviewed and noncontributory   family history includes Cancer in his mother; Coronary Art Dis in his father; Diabetes in his mother.     Soc HX:   Social History     Socioeconomic History    Marital status:      Spouse name: Not on file    Number of children: 1    Years of education: 15    Highest education level: Not on file   Occupational History     Comment: Heavy equipment   Tobacco Use    Smoking status: Former Smoker     Packs/day: 1.00     Years: 1.00     Pack years: 1.00     Types: Cigarettes     Start date: 2017     Quit date: 2021     Years since quittin.7    Smokeless tobacco: Never Used   Substance and Sexual Activity    Alcohol use: Yes     Alcohol/week: 6.0 standard drinks     Types: 6 Standard drinks or equivalent per week     Comment: 3 or 4 beers per day, most days    Drug use: Yes     Types: Marijuana Berneta James)     Comment: 2 or 3 times per week    Sexual activity: Yes     Partners: Female   Other Topics Concern    Not on file   Social History Narrative    Not on file     Social Determinants of Health     Financial Resource Strain:     Difficulty of Paying Living Expenses: Not on file   Food Insecurity:     Worried About Running Out of Food in the Last Year: Not on file    Mauricio of Food in the Last Year: Not on file   Transportation Needs:     Lack of Transportation (Medical): Not on file    Lack of Transportation (Non-Medical):  Not on file   Physical Activity:     Days of Exercise per Week: Not on file    Minutes of Exercise per Session: Not on file   Stress:     Feeling of Stress : Not on file   Social Connections:     Frequency of Communication with Friends and Family: Not on file    Frequency of Social Gatherings with Friends and Family: Not on file    Attends Denominational Services: Not on file    Active Member of Clubs or Organizations: Not on file    Attends Club or Organization Meetings: Not on file    Marital Status: Not on file   Intimate Partner Violence:     Fear of Current or Ex-Partner: Not on file    Emotionally Abused: Not on file    Physically Abused: Not on file    Sexually Abused: Not on file   Housing Stability:     Unable to Pay for Housing in the Last Year: Not on file    Number of Places Lived in the Last Year: Not on file    Unstable Housing in the Last Year: Not on file     TOBACCO:   reports that he quit smoking about 9 months ago. His smoking use included cigarettes. He started smoking about 4 years ago. He has a 1.00 pack-year smoking history. He has never used smokeless tobacco.  ETOH:   reports current alcohol use of about 6.0 standard drinks of alcohol per week. Drugs:  reports current drug use. Drug: Marijuana Angel Blow). Allergies: Allergies   Allergen Reactions    Sulfa Antibiotics Rash       Home Medications:     Prior to Admission medications    Medication Sig Start Date End Date Taking? Authorizing Provider   amLODIPine (NORVASC) 5 MG tablet TAKE 1 TABLET BY MOUTH ONCE A DAY (Ky Cones) 12/13/21 April T RENNY Swift CNP   carvedilol (COREG) 6.25 MG tablet Take 1 tablet by mouth 2 times daily 12/13/21 April T RENNY Swift CNP   tamsulosin [facility]) 0.4 MG capsule Take 1 capsule by mouth daily 12/13/21 April T RENNY Swift CNP   albuterol sulfate  (90 Base) MCG/ACT inhaler INHALE 2 PUFFS INTO THE LUNGS EVERY 4 HOURS AS NEEDED FOR WHEEZING OR SHORTNESS OF BREATH 10/15/21   Sarika Garcia MD   atorvastatin (LIPITOR) 40 MG tablet TAKE 1 TABLET BY MOUTH DAILY **STOP PRAVASTATIN** 10/15/21   Sarika Garcia MD   aspirin 81 MG EC tablet Take 1 tablet by mouth daily 1/3/17   Sarika Garcia MD         Medications:   Medications:    ipratropium-albuterol          Infusions:   PRN Meds:      Data:     Laboratory this visit:  Reviewed  Recent Labs     12/13/21 1950   WBC 8.8   HGB 17.5   HCT 55.6*         Recent Labs     12/13/21 1950      K 5.0   CL 96*   CO2 22   BUN 9   CREATININE 0.7*     Recent Labs     12/13/21 1950   AST 15   ALT 10   BILITOT 0.5   ALKPHOS 60     No results for input(s): INR in the last 72 hours. Radiology this visit:  Reviewed.     XR CHEST PORTABLE    Result Date: 12/13/2021  EXAMINATION: ONE XRAY VIEW OF THE CHEST 12/13/2021 7:30 pm COMPARISON: Chest radiograph 02/15/2019 HISTORY: ORDERING SYSTEM PROVIDED HISTORY: shortness of breath TECHNOLOGIST PROVIDED HISTORY: Reason for exam:->shortness of breath Reason for Exam: shortness of breath Additional signs and symptoms: shortness of breath FINDINGS: The lungs are without acute focal process. There is no effusion or pneumothorax. The cardiomediastinal silhouette is without acute process. Status post CABG with median sternotomy the osseous structures are without acute process. No acute process. CTA PULMONARY W CONTRAST    Result Date: 12/13/2021  EXAMINATION: CTA OF THE CHEST 12/13/2021 10:02 pm TECHNIQUE: CTA of the chest was performed after the administration of intravenous contrast.  Multiplanar reformatted images are provided for review. MIP images are provided for review. Dose modulation, iterative reconstruction, and/or weight based adjustment of the mA/kV was utilized to reduce the radiation dose to as low as reasonably achievable. COMPARISON: None. HISTORY: ORDERING SYSTEM PROVIDED HISTORY: Hypoxia TECHNOLOGIST PROVIDED HISTORY: Reason for exam:->Hypoxia Decision Support Exception - unselect if not a suspected or confirmed emergency medical condition->Emergency Medical Condition (MA) Reason for Exam: Hypoxia, PE?, 75 ml isovue 370 Additional signs and symptoms: Hypoxia, PE?, 75 ml isovue 370 FINDINGS: Pulmonary Arteries: Pulmonary arteries are adequately opacified for evaluation. No evidence of intraluminal filling defect to suggest pulmonary embolism. Main pulmonary artery is normal in caliber. Mediastinum: No evidence of mediastinal lymphadenopathy. The heart and pericardium demonstrate no acute abnormality. There is no acute abnormality of the thoracic aorta. Lungs/pleura: The lungs are without acute process. No focal consolidation or pulmonary edema. No evidence of pleural effusion or pneumothorax.  Upper Abdomen: Limited images of the upper abdomen are unremarkable. Soft Tissues/Bones: Mild superior endplate compression of the T5, T6, and T8 vertebral bodies, most likely chronic in nature. No evidence of pulmonary embolism or acute pulmonary abnormality.  RECOMMENDATIONS: Unavailable           EKG this visit:  Reviewed   EKG:      Electronically signed by RENNY Corona CNP on 12/13/2021 at 11:22 PM

## 2021-12-14 NOTE — ED NOTES
Report given to Catrachita Callejas LPN on floor. Pt to be transferred.       Andres Hanks RN  12/13/21 9544

## 2021-12-14 NOTE — CARE COORDINATION
CM met with the patient for discharge planning. Patient lives at home with his wife, has insurance with Rx coverage & PCP, is independent with ADL's, is employed, and is able to drive. Patient does not require the use of any assistive devices or home oxygen but does use an inhaler PRN. Patient stated that he is concerned that he may require home oxygen. Patient plans to return home upon discharge and is unable to identify any needs at this time. CM available if needs arise.

## 2021-12-14 NOTE — PROGRESS NOTES
Skin assessment completed by this nurse and Jono Mccormick RN. Noted old scar and missing 3 toes to right foot. 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 Patient resting quietly in bed at this time.

## 2021-12-14 NOTE — PROGRESS NOTES
Hospitalist Progress Note      Name:  Javier Green /Age/Sex: 1965  (60 y.o. male)   MRN & CSN:  [id number] & [id number] Admission Date/Time: 2021  6:49 PM   Location:   PCP: Beto Sutton MD         Hospital Day: 2    Assessment and Plan:     Acute hypoxemic hypercapnic respiratory failure, most likely secondary to COPD exacerbation, patient at home was satting 74 to 75% on room air he presented to the ER where he was placed on 4 L nasal cannula, he remains on 4 L today. Will change prednisone p.o. to IV Solu-Medrol, change p.o. doxycycline to IV doxy. Continue bronchodilators, add Atrovent MDI 2 puffs 4 times daily. Continue to attempt to wean oxygen, if we're unable to wean patient off of oxygen will do home O2 eval.  COPD exacerbation, treatment as above  Coronary artery disease, patient is having no chest pain at this time, he is status post CABG x4 vessels. Continue aspirin, atorvastatin. Ongoing tobacco abuse, nicotine patch, patient tells me he attempted to quit smoking approximately 9 months ago however he continues to smoke on and off. He tells me he smoked 2 cigarettes a day he came to the hospital.  Hypertension, continue current home medications  DVT prophylaxis with Lovenox  GI prophylaxis with Protonix    Diet ADULT DIET; Regular; Low Fat/Low Chol/High Fiber/LIZZY   DVT Prophylaxis [x] Lovenox, []  Heparin, [] SCDs, [] Ambulation   GI Prophylaxis [x] PPI,  [] H2 Blocker,  [] Carafate,  [] Diet/Tube Feeds   Code Status Full Code             History of Present Illness:     Chief Complaint:   Javier Green is a 60 y.o.  male  who presents with shortness of breath. He tells me that he woke up approximately 2 days ago with symptoms. He states that he has been nodding on and off, he states that his wife did check his O2 sat and it was 74 to 75%. He is a .  He then proceeded to go to work, however he received a call from his primary care physician indicating he should come to the hospital. He tells me over the past several weeks he has noticed that when he is laying down he is more short of breath, he is more short of breath with any exertion. He denies any nausea vomiting diarrhea fevers. He does have a cough he states he always has a cough and coughs up sputum that is brown. He does have an extensive heart history including bypass but has not followed up with cardiology in a long time. Today on my evaluation he is remains on 4 L nasal cannula O2 saturations are between 91 and 93%. Continue to attempt to wean oxygen. Ten point ROS reviewed negative, unless as noted above    Objective: Intake/Output Summary (Last 24 hours) at 12/14/2021 1718  Last data filed at 12/14/2021 5364  Gross per 24 hour   Intake --   Output 700 ml   Net -700 ml      Vitals:   Vitals:    12/14/21 0753   BP:    Pulse:    Resp:    Temp:    SpO2: 93%     Physical Exam:   GEN Awake male, sitting upright in bed in no apparent distress. Appears given age. EYES Pupils are equally round. No scleral erythema, discharge, or conjunctivitis. HENT Mucous membranes are moist. Oral pharynx without exudates, no evidence of thrush. NECK Supple, no apparent thyromegaly or masses. RESP wheezes, diminished and tight breath sounds 4 L nasal cannula  CARDIO/VASC S1/S2 auscultated. Regular rate without appreciable murmurs, rubs, or gallops. No JVD or carotid bruits. Peripheral pulses equal bilaterally and palpable. No peripheral edema. GI Abdomen is soft without significant tenderness, masses, or guarding. Bowel sounds are normoactiveGU No costovertebral angle tenderness. MSK No gross joint deformities. SKIN Normal coloration, warm, dry. NEURO Cranial nerves appear grossly intact, normal speech, no lateralizing weakness. PSYCH Awake, alert, oriented x 4. Affect appropriate.     Medications:   Medications:    methylPREDNISolone  40 mg IntraVENous Q6H    doxycycline (VIBRAMYCIN) IV  100 WBC 6.8 K/CU MM    RBC 6.07 M/CU MM    Hemoglobin 18.0 GM/DL    Hematocrit 58.3 High  %    MCV 96.0 FL    MCH 29.7 PG    MCHC 30.9 Low  %    RDW 12.2 %    Platelets 579 K/CU MM    MPV 9.0 FL    Differential Type AUTOMATED DIFFERENTIAL    Segs Relative 86.8 High  %    Lymphocytes % 11.3 Low  %    Monocytes % 1.2 %    Eosinophils % 0.0 %    Basophils % 0.1 %    Segs Absolute 5.9 K/CU MM    Lymphocytes Absolute 0.8 K/CU MM    Monocytes Absolute 0.1 K/CU MM    Eosinophils Absolute 0.0 K/CU MM    Basophils Absolute 0.0 K/CU MM    Immature Neutrophil % 0.6 High  %    Total Immature Neutrophil 0.04 K/CU MM   Troponin [[id number]]    Collected: 12/14/21 0040    Updated: 12/14/21 0147    Specimen Source: Blood     Troponin T <0.010 NG/ML         Electronically signed by RENNY Goetz NP on 12/14/2021 at 5:18 PM

## 2021-12-14 NOTE — PLAN OF CARE
Problem: Discharge Planning:  Goal: Discharged to appropriate level of care  Description: Discharged to appropriate level of care  Outcome: Ongoing     Problem:  Activity Intolerance:  Goal: Ability to tolerate increased activity will improve  Description: Ability to tolerate increased activity will improve  Outcome: Ongoing     Problem: Airway Clearance - Ineffective:  Goal: Ability to maintain a clear airway will improve  Description: Ability to maintain a clear airway will improve  Outcome: Ongoing     Problem: Breathing Pattern - Ineffective:  Goal: Ability to achieve and maintain a regular respiratory rate will improve  Description: Ability to achieve and maintain a regular respiratory rate will improve  Outcome: Ongoing     Problem: Gas Exchange - Impaired:  Goal: Levels of oxygenation will improve  Description: Levels of oxygenation will improve  Outcome: Ongoing

## 2021-12-14 NOTE — PLAN OF CARE
Patient continues to experience shortness of breath during exertion. Problem: Activity Intolerance:  Goal: Ability to tolerate increased activity will improve  Description: Ability to tolerate increased activity will improve  12/14/2021 1527 by Lyssa Wren RN  Outcome: Ongoing  12/14/2021 0128 by Desiree Sifuentes RN  Outcome: Ongoing     Patient continues to require oxygen via nasal cannula to keep oxygen saturation above 90%.   Problem: Airway Clearance - Ineffective:  Goal: Ability to maintain a clear airway will improve  Description: Ability to maintain a clear airway will improve  12/14/2021 1527 by Lyssa Wren RN  Outcome: Ongoing  12/14/2021 0128 by Desiree Sifuentes RN  Outcome: Ongoing

## 2021-12-14 NOTE — ED NOTES
Pt reports has been feeling lethargic and unable to stay awake. States having cough with it being productive at times. Reports having COPD and has been SOB. Wife checking o2 sat at home and being in the 70s this is when she advised him needing to get medical attention. Denies wearing o2 at home.       Andres Hanks RN  12/13/21 3637

## 2021-12-15 PROCEDURE — 2580000003 HC RX 258: Performed by: NURSE PRACTITIONER

## 2021-12-15 PROCEDURE — 94761 N-INVAS EAR/PLS OXIMETRY MLT: CPT

## 2021-12-15 PROCEDURE — 2700000000 HC OXYGEN THERAPY PER DAY

## 2021-12-15 PROCEDURE — 6360000002 HC RX W HCPCS: Performed by: NURSE PRACTITIONER

## 2021-12-15 PROCEDURE — 94640 AIRWAY INHALATION TREATMENT: CPT

## 2021-12-15 PROCEDURE — 2140000000 HC CCU INTERMEDIATE R&B

## 2021-12-15 PROCEDURE — 2500000003 HC RX 250 WO HCPCS: Performed by: NURSE PRACTITIONER

## 2021-12-15 PROCEDURE — 6370000000 HC RX 637 (ALT 250 FOR IP): Performed by: INTERNAL MEDICINE

## 2021-12-15 PROCEDURE — 6370000000 HC RX 637 (ALT 250 FOR IP): Performed by: NURSE PRACTITIONER

## 2021-12-15 RX ORDER — CARVEDILOL 6.25 MG/1
6.25 TABLET ORAL 2 TIMES DAILY WITH MEALS
Status: DISCONTINUED | OUTPATIENT
Start: 2021-12-15 | End: 2021-12-17 | Stop reason: HOSPADM

## 2021-12-15 RX ADMIN — METHYLPREDNISOLONE SODIUM SUCCINATE 40 MG: 40 INJECTION, POWDER, FOR SOLUTION INTRAMUSCULAR; INTRAVENOUS at 03:09

## 2021-12-15 RX ADMIN — ALBUTEROL SULFATE 2 PUFF: 108 INHALANT RESPIRATORY (INHALATION) at 07:56

## 2021-12-15 RX ADMIN — SODIUM CHLORIDE 250 ML: 9 INJECTION, SOLUTION INTRAVENOUS at 15:55

## 2021-12-15 RX ADMIN — METHYLPREDNISOLONE SODIUM SUCCINATE 40 MG: 40 INJECTION, POWDER, FOR SOLUTION INTRAMUSCULAR; INTRAVENOUS at 15:51

## 2021-12-15 RX ADMIN — SODIUM CHLORIDE 250 ML: 9 INJECTION, SOLUTION INTRAVENOUS at 03:10

## 2021-12-15 RX ADMIN — IPRATROPIUM BROMIDE 2 PUFF: 17 AEROSOL, METERED RESPIRATORY (INHALATION) at 15:00

## 2021-12-15 RX ADMIN — ASPIRIN 81 MG: 81 TABLET, COATED ORAL at 08:22

## 2021-12-15 RX ADMIN — AMLODIPINE BESYLATE 5 MG: 5 TABLET ORAL at 08:22

## 2021-12-15 RX ADMIN — METHYLPREDNISOLONE SODIUM SUCCINATE 40 MG: 40 INJECTION, POWDER, FOR SOLUTION INTRAMUSCULAR; INTRAVENOUS at 20:14

## 2021-12-15 RX ADMIN — IPRATROPIUM BROMIDE 2 PUFF: 17 AEROSOL, METERED RESPIRATORY (INHALATION) at 11:18

## 2021-12-15 RX ADMIN — DEXTROSE MONOHYDRATE 100 MG: 5 INJECTION INTRAVENOUS at 15:56

## 2021-12-15 RX ADMIN — GUAIFENESIN 600 MG: 600 TABLET ORAL at 08:22

## 2021-12-15 RX ADMIN — ALBUTEROL SULFATE 2 PUFF: 108 INHALANT RESPIRATORY (INHALATION) at 11:15

## 2021-12-15 RX ADMIN — IPRATROPIUM BROMIDE 2 PUFF: 17 AEROSOL, METERED RESPIRATORY (INHALATION) at 19:32

## 2021-12-15 RX ADMIN — IPRATROPIUM BROMIDE 2 PUFF: 17 AEROSOL, METERED RESPIRATORY (INHALATION) at 07:58

## 2021-12-15 RX ADMIN — ENOXAPARIN SODIUM 40 MG: 100 INJECTION SUBCUTANEOUS at 08:22

## 2021-12-15 RX ADMIN — DEXTROSE MONOHYDRATE 100 MG: 5 INJECTION INTRAVENOUS at 03:11

## 2021-12-15 RX ADMIN — METHYLPREDNISOLONE SODIUM SUCCINATE 40 MG: 40 INJECTION, POWDER, FOR SOLUTION INTRAMUSCULAR; INTRAVENOUS at 08:22

## 2021-12-15 RX ADMIN — CARVEDILOL 6.25 MG: 6.25 TABLET, FILM COATED ORAL at 17:43

## 2021-12-15 RX ADMIN — ALBUTEROL SULFATE 2 PUFF: 108 INHALANT RESPIRATORY (INHALATION) at 19:29

## 2021-12-15 RX ADMIN — CARVEDILOL 6.25 MG: 6.25 TABLET, FILM COATED ORAL at 08:22

## 2021-12-15 RX ADMIN — ALBUTEROL SULFATE 2 PUFF: 108 INHALANT RESPIRATORY (INHALATION) at 14:58

## 2021-12-15 ASSESSMENT — PAIN SCALES - GENERAL
PAINLEVEL_OUTOF10: 0

## 2021-12-15 NOTE — PROGRESS NOTES
Hospitalist Progress Note      Name:  Alaina Smallwood /Age/Sex: 1965  (60 y.o. male)   MRN & CSN:  [id number] & [id number] Admission Date/Time: 2021  6:49 PM   Location:   PCP: Erika Conte MD         Hospital Day: 3    Assessment and Plan:     Acute hypoxemic hypercapnic respiratory failure, most likely secondary to COPD exacerbation, patient at home was satting 74 to 75% on room air he presented to the ER where he was placed on 4 L nasal cannula, today he is down to 3 L nasal cannula, continue IV Solu-Medrol, IV doxycycline continue bronchodilators and Atrovent MDI 2 puffs 4 times daily. Continue to attempt to wean oxygen. Home oxygen evaluation has been ordered. COPD exacerbation, treatment as above  Coronary artery disease, patient is having no chest pain at this time, he is status post CABG x4 vessels. Continue aspirin, atorvastatin. Ongoing tobacco abuse, nicotine patch, patient tells me he attempted to quit smoking approximately 9 months ago however he continues to smoke on and off. He tells me he smoked 2 cigarettes a day he came to the hospital.  Hypertension, continue current home medications  DVT prophylaxis with Lovenox  GI prophylaxis with Protonix    Diet ADULT DIET; Regular; Low Fat/Low Chol/High Fiber/LIZZY   DVT Prophylaxis [] Lovenox, []  Heparin, [] SCDs, [] Ambulation   GI Prophylaxis [] PPI,  [] H2 Blocker,  [] Carafate,  [] Diet/Tube Feeds   Code Status Full Code             History of Present Illness:     Chief Complaint:   Alaina Smallwood is a 60 y.o.  male  who presents with shortness of breath. Upon evaluation today he is lying in bed stating that he feels wore out today. He has been decreased at the 3 L oxygen via nasal cannula his O2 sats are 94%. He denies any chest pain he denies shortness of breath at this time. He states he just feels tired. He has no complaints of nausea, vomiting, diarrhea no fevers no abdominal pain.   Continue to attempt to wean oxygen home O2 evaluation has been ordered. Ten point ROS reviewed negative, unless as noted above    Objective:   No intake or output data in the 24 hours ending 12/15/21 1647   Vitals:   Vitals:    12/15/21 1502   BP:    Pulse:    Resp:    Temp:    SpO2: 94%     Physical Exam:   GEN Awake male, sitting upright in bed in no apparent distress. Appears given age. EYES Pupils are equally round. No scleral erythema, discharge, or conjunctivitis. HENT Mucous membranes are moist. Oral pharynx without exudates, no evidence of thrush. NECK Supple, no apparent thyromegaly or masses. RESP diminished breath sounds, scattered faint wheezes on 3 L  CARDIO/VASC S1/S2 auscultated. Regular rate without appreciable murmurs, rubs, or gallops. No JVD or carotid bruits. Peripheral pulses equal bilaterally and palpable. No peripheral edema. GI Abdomen is soft without significant tenderness, masses, or guarding. Bowel sounds are normoactive.  No costovertebral angle tenderness. MSK No gross joint deformities. SKIN Normal coloration, warm, dry. NEURO Cranial nerves appear grossly intact, normal speech, no lateralizing weakness. PSYCH Awake, alert, oriented x 4. Affect appropriate.     Medications:   Medications:    carvedilol  6.25 mg Oral BID WC    methylPREDNISolone  40 mg IntraVENous Q6H    doxycycline (VIBRAMYCIN) IV  100 mg IntraVENous Q12H    ipratropium  2 puff Inhalation 4x daily    albuterol sulfate HFA  2 puff Inhalation 4x daily    amLODIPine  5 mg Oral Daily    aspirin  81 mg Oral Daily    enoxaparin  40 mg SubCUTAneous Daily    nicotine  1 patch TransDERmal Daily      Infusions:    sodium chloride 250 mL (12/15/21 1555)     PRN Meds: sodium chloride, 250 mL, PRN  polyethylene glycol, 17 g, Daily PRN  acetaminophen, 650 mg, Q6H PRN   Or  acetaminophen, 650 mg, Q6H PRN  famotidine, 20 mg, BID PRN  ipratropium-albuterol, 1 ampule, Q4H PRN              Electronically signed by Jadyn Pino APRN - NP on 12/15/2021 at 4:47 PM

## 2021-12-15 NOTE — PLAN OF CARE
Patient states discharge plan is to return home with spouse  Problem: Discharge Planning:  Goal: Discharged to appropriate level of care  Description: Discharged to appropriate level of care  Outcome: Ongoing

## 2021-12-15 NOTE — PLAN OF CARE
Problem: Discharge Planning:  Goal: Discharged to appropriate level of care  Description: Discharged to appropriate level of care  12/14/2021 2351 by Dipak Purcell LPN  Outcome: Ongoing  12/14/2021 1527 by Alcon Lemus RN  Outcome: Ongoing     Problem:  Activity Intolerance:  Goal: Ability to tolerate increased activity will improve  Description: Ability to tolerate increased activity will improve  12/14/2021 2351 by Dipak Purcell LPN  Outcome: Ongoing  12/14/2021 1527 by Alcon Lemus RN  Outcome: Ongoing     Problem: Airway Clearance - Ineffective:  Goal: Ability to maintain a clear airway will improve  Description: Ability to maintain a clear airway will improve  12/14/2021 2351 by Dipak Purcell LPN  Outcome: Ongoing  12/14/2021 1527 by Alcon Lemus RN  Outcome: Ongoing     Problem: Breathing Pattern - Ineffective:  Goal: Ability to achieve and maintain a regular respiratory rate will improve  Description: Ability to achieve and maintain a regular respiratory rate will improve  12/14/2021 2351 by Dipak Purcell LPN  Outcome: Ongoing  12/14/2021 1527 by Alcon Lemus RN  Outcome: Ongoing     Problem: Gas Exchange - Impaired:  Goal: Levels of oxygenation will improve  Description: Levels of oxygenation will improve  12/14/2021 2351 by Dipak Purcell LPN  Outcome: Ongoing  12/14/2021 1527 by Alcon Lemus RN  Outcome: Ongoing

## 2021-12-16 ENCOUNTER — TELEPHONE (OUTPATIENT)
Dept: INTERNAL MEDICINE CLINIC | Age: 56
End: 2021-12-16

## 2021-12-16 LAB
ANION GAP SERPL CALCULATED.3IONS-SCNC: 7 MMOL/L (ref 4–16)
BUN BLDV-MCNC: 21 MG/DL (ref 6–23)
CALCIUM SERPL-MCNC: 8.9 MG/DL (ref 8.3–10.6)
CHLORIDE BLD-SCNC: 98 MMOL/L (ref 99–110)
CO2: 36 MMOL/L (ref 21–32)
CREAT SERPL-MCNC: 0.8 MG/DL (ref 0.9–1.3)
GFR AFRICAN AMERICAN: >60 ML/MIN/1.73M2
GFR NON-AFRICAN AMERICAN: >60 ML/MIN/1.73M2
GLUCOSE BLD-MCNC: 145 MG/DL (ref 70–99)
HCT VFR BLD CALC: 55.4 % (ref 42–52)
HEMOGLOBIN: 17.3 GM/DL (ref 13.5–18)
MCH RBC QN AUTO: 29.4 PG (ref 27–31)
MCHC RBC AUTO-ENTMCNC: 31.2 % (ref 32–36)
MCV RBC AUTO: 94.2 FL (ref 78–100)
PDW BLD-RTO: 12.3 % (ref 11.7–14.9)
PLATELET # BLD: 243 K/CU MM (ref 140–440)
PMV BLD AUTO: 9.3 FL (ref 7.5–11.1)
POTASSIUM SERPL-SCNC: 4.4 MMOL/L (ref 3.5–5.1)
RBC # BLD: 5.88 M/CU MM (ref 4.6–6.2)
SODIUM BLD-SCNC: 141 MMOL/L (ref 135–145)
WBC # BLD: 18.3 K/CU MM (ref 4–10.5)

## 2021-12-16 PROCEDURE — 2140000000 HC CCU INTERMEDIATE R&B

## 2021-12-16 PROCEDURE — 2580000003 HC RX 258: Performed by: NURSE PRACTITIONER

## 2021-12-16 PROCEDURE — 6360000002 HC RX W HCPCS: Performed by: NURSE PRACTITIONER

## 2021-12-16 PROCEDURE — 36415 COLL VENOUS BLD VENIPUNCTURE: CPT

## 2021-12-16 PROCEDURE — 80048 BASIC METABOLIC PNL TOTAL CA: CPT

## 2021-12-16 PROCEDURE — 6370000000 HC RX 637 (ALT 250 FOR IP): Performed by: NURSE PRACTITIONER

## 2021-12-16 PROCEDURE — 94640 AIRWAY INHALATION TREATMENT: CPT

## 2021-12-16 PROCEDURE — 2700000000 HC OXYGEN THERAPY PER DAY

## 2021-12-16 PROCEDURE — 94761 N-INVAS EAR/PLS OXIMETRY MLT: CPT

## 2021-12-16 PROCEDURE — 6370000000 HC RX 637 (ALT 250 FOR IP): Performed by: PHYSICIAN ASSISTANT

## 2021-12-16 PROCEDURE — 85027 COMPLETE CBC AUTOMATED: CPT

## 2021-12-16 PROCEDURE — 2500000003 HC RX 250 WO HCPCS: Performed by: NURSE PRACTITIONER

## 2021-12-16 RX ORDER — LANOLIN ALCOHOL/MO/W.PET/CERES
3 CREAM (GRAM) TOPICAL NIGHTLY PRN
Status: DISCONTINUED | OUTPATIENT
Start: 2021-12-16 | End: 2021-12-17 | Stop reason: HOSPADM

## 2021-12-16 RX ADMIN — SODIUM CHLORIDE 250 ML: 9 INJECTION, SOLUTION INTRAVENOUS at 15:36

## 2021-12-16 RX ADMIN — IPRATROPIUM BROMIDE 2 PUFF: 17 AEROSOL, METERED RESPIRATORY (INHALATION) at 15:20

## 2021-12-16 RX ADMIN — METHYLPREDNISOLONE SODIUM SUCCINATE 40 MG: 40 INJECTION, POWDER, FOR SOLUTION INTRAMUSCULAR; INTRAVENOUS at 08:05

## 2021-12-16 RX ADMIN — METHYLPREDNISOLONE SODIUM SUCCINATE 40 MG: 40 INJECTION, POWDER, FOR SOLUTION INTRAMUSCULAR; INTRAVENOUS at 19:54

## 2021-12-16 RX ADMIN — IPRATROPIUM BROMIDE 2 PUFF: 17 AEROSOL, METERED RESPIRATORY (INHALATION) at 19:17

## 2021-12-16 RX ADMIN — METHYLPREDNISOLONE SODIUM SUCCINATE 40 MG: 40 INJECTION, POWDER, FOR SOLUTION INTRAMUSCULAR; INTRAVENOUS at 03:11

## 2021-12-16 RX ADMIN — ALBUTEROL SULFATE 2 PUFF: 108 INHALANT RESPIRATORY (INHALATION) at 11:45

## 2021-12-16 RX ADMIN — ENOXAPARIN SODIUM 40 MG: 100 INJECTION SUBCUTANEOUS at 08:04

## 2021-12-16 RX ADMIN — ALBUTEROL SULFATE 2 PUFF: 108 INHALANT RESPIRATORY (INHALATION) at 15:20

## 2021-12-16 RX ADMIN — IPRATROPIUM BROMIDE 2 PUFF: 17 AEROSOL, METERED RESPIRATORY (INHALATION) at 11:45

## 2021-12-16 RX ADMIN — ASPIRIN 81 MG: 81 TABLET, COATED ORAL at 08:03

## 2021-12-16 RX ADMIN — AMLODIPINE BESYLATE 5 MG: 5 TABLET ORAL at 08:04

## 2021-12-16 RX ADMIN — ALBUTEROL SULFATE 2 PUFF: 108 INHALANT RESPIRATORY (INHALATION) at 19:19

## 2021-12-16 RX ADMIN — SODIUM CHLORIDE 250 ML: 9 INJECTION, SOLUTION INTRAVENOUS at 03:07

## 2021-12-16 RX ADMIN — IPRATROPIUM BROMIDE 2 PUFF: 17 AEROSOL, METERED RESPIRATORY (INHALATION) at 07:50

## 2021-12-16 RX ADMIN — ALBUTEROL SULFATE 2 PUFF: 108 INHALANT RESPIRATORY (INHALATION) at 07:50

## 2021-12-16 RX ADMIN — DEXTROSE MONOHYDRATE 100 MG: 5 INJECTION INTRAVENOUS at 15:39

## 2021-12-16 RX ADMIN — CARVEDILOL 6.25 MG: 6.25 TABLET, FILM COATED ORAL at 17:48

## 2021-12-16 RX ADMIN — DEXTROSE MONOHYDRATE 100 MG: 5 INJECTION INTRAVENOUS at 03:08

## 2021-12-16 RX ADMIN — Medication 3 MG: at 22:11

## 2021-12-16 RX ADMIN — CARVEDILOL 6.25 MG: 6.25 TABLET, FILM COATED ORAL at 08:03

## 2021-12-16 RX ADMIN — METHYLPREDNISOLONE SODIUM SUCCINATE 40 MG: 40 INJECTION, POWDER, FOR SOLUTION INTRAMUSCULAR; INTRAVENOUS at 15:35

## 2021-12-16 NOTE — TELEPHONE ENCOUNTER
[facility] Transitions Initial Follow Up Call    Outreach made within 2 business days of discharge: Yes    Patient: Nomi Amaral Patient : 1965   MRN: [id number]  Reason for Admission: There are no discharge diagnoses documented for the most recent discharge. Discharge Date: 2/15/19       Spoke with: patient    Discharge department/facility: [facility]    TCM Interactive Patient Contact:  Was patient able to fill all prescriptions: Yes  Was patient instructed to bring all medications to the follow-up visit: Yes  Is patient taking all medications as directed in the discharge summary?  Yes  Does patient understand their discharge instructions: Yes  Does patient have questions or concerns that need addressed prior to 7-14 day follow up office visit: no    Scheduled appointment with PCP within 7-14 days    Follow Up  Future Appointments   Date Time Provider Martha Casey   2021  9:15 AM Quintin Nye MD [address] [place] [facility]   2022 10:15 AM Quintin Nye MD [address] [place] [facility]       Arslan Ford MA

## 2021-12-16 NOTE — PROGRESS NOTES
RESP CARE HOME 02 EVAL: PT ON ROOM AIR @ REST Sa02 79% PLACED BACK ON 2LNC Sa02 86% INCREASED TO 3LNC Sa02 92%

## 2021-12-16 NOTE — PLAN OF CARE
Problem: Discharge Planning:  Goal: Discharged to appropriate level of care  Description: Discharged to appropriate level of care  12/16/2021 0816 by Leonard Harden RN  Outcome: Ongoing  12/15/2021 2017 by Matias Trevino RN  Outcome: Ongoing     Problem:  Activity Intolerance:  Goal: Ability to tolerate increased activity will improve  Description: Ability to tolerate increased activity will improve  12/16/2021 0816 by Leonard Harden RN  Outcome: Ongoing  12/15/2021 2017 by Matias Trevino RN  Outcome: Ongoing     Problem: Airway Clearance - Ineffective:  Goal: Ability to maintain a clear airway will improve  Description: Ability to maintain a clear airway will improve  12/16/2021 0816 by Leonard Harden RN  Outcome: Ongoing  12/15/2021 2017 by Matias Trevino RN  Outcome: Ongoing     Problem: Breathing Pattern - Ineffective:  Goal: Ability to achieve and maintain a regular respiratory rate will improve  Description: Ability to achieve and maintain a regular respiratory rate will improve  12/16/2021 0816 by Leonard Harden RN  Outcome: Ongoing  12/15/2021 2017 by Matias Trevino RN  Outcome: Ongoing     Problem: Gas Exchange - Impaired:  Goal: Levels of oxygenation will improve  Description: Levels of oxygenation will improve  12/16/2021 0816 by Leonard Harden RN  Outcome: Ongoing  12/15/2021 2017 by Matias Trevino RN  Outcome: Ongoing

## 2021-12-16 NOTE — PROGRESS NOTES
Hospitalist Progress Note      Name:  Luis Singer /Age/Sex: 1965  (60 y.o. male)   MRN & CSN:  [id number] & [id number] Admission Date/Time: 2021  6:49 PM   Location:   PCP: Tarri Gaucher, MD         Hospital Day: 4    Assessment and Plan:     Acute hypoxemic hypercapnic respiratory failure, most likely secondary to COPD exacerbation,continue IV Solu-Medrol, IV doxycycline, continue bronchodilators and Atrovent MDI 2 puffs 4 times daily. Continue to attempt to wean oxygen. Home oxygen evaluation has been ordered. COPD exacerbation, treatment as above  Coronary artery disease, patient is having no chest pain at this time, he is status post CABG x4 vessels. Continue aspirin, atorvastatin. Ongoing tobacco abuse, nicotine patch, patient tells me he attempted to quit smoking approximately 9 months ago however he continues to smoke on and off. He tells me he smoked 2 cigarettes a day he came to the hospital.  Hypertension, continue current home medications  DVT prophylaxis with Lovenox  GI prophylaxis with Protonix      Diet ADULT DIET; Regular; Low Fat/Low Chol/High Fiber/LIZZY   DVT Prophylaxis [] Lovenox, []  Heparin, [] SCDs, [] Ambulation   GI Prophylaxis [] PPI,  [] H2 Blocker,  [] Carafate,  [] Diet/Tube Feeds   Code Status Full Code             History of Present Illness:     Chief Complaint: <principal problem not specified>  Luis Singer is a 60 y.o.  male  who presents with SOB    At home patient at home was satting 74 to 75% on room air he presented to the ER where he was placed on 4 L nasal cannula, today he is down to 3 L nasal cannula, he was weaned down to 2 L today. However he states he just does not feel good and does not feel ready to go home. Home O2 evaluation was completed he does have home O2 available for discharge. Ten point ROS reviewed negative, unless as noted above    Objective:        Intake/Output Summary (Last 24 hours) at 2021 1751  Last data filed at 12/16/2021 0929  Gross per 24 hour   Intake 840 ml   Output --   Net 840 ml      Vitals:   Vitals:    12/16/21 1748   BP: 129/79   Pulse: 72   Resp:    Temp:    SpO2:      Physical Exam:   GEN Awake male, sitting upright in bed in no apparent distress. Appears given age. EYES Pupils are equally round. No scleral erythema, discharge, or conjunctivitis. HENT Mucous membranes are moist. Oral pharynx without exudates, no evidence of thrush. NECK Supple, no apparent thyromegaly or masses. RESP Clear to auscultation,but diminished  CARDIO/VASC S1/S2 auscultated. Regular rate without appreciable murmurs, rubs, or gallops. No JVD or carotid bruits. Peripheral pulses equal bilaterally and palpable. No peripheral edema. GI Abdomen is soft without significant tenderness, masses, or guarding. Bowel sounds are normoactive. Rectal exam deferred.  No costovertebral angle tenderness. MSK No gross joint deformities. SKIN Normal coloration, warm, dry. NEURO Cranial nerves appear grossly intact, normal speech, no lateralizing weakness. PSYCH Awake, alert, oriented x 4. Affect appropriate.     Medications:   Medications:    carvedilol  6.25 mg Oral BID WC    methylPREDNISolone  40 mg IntraVENous Q6H    doxycycline (VIBRAMYCIN) IV  100 mg IntraVENous Q12H    ipratropium  2 puff Inhalation 4x daily    albuterol sulfate HFA  2 puff Inhalation 4x daily    amLODIPine  5 mg Oral Daily    aspirin  81 mg Oral Daily    enoxaparin  40 mg SubCUTAneous Daily    nicotine  1 patch TransDERmal Daily      Infusions:    sodium chloride 250 mL (12/16/21 1536)     PRN Meds: sodium chloride, 250 mL, PRN  polyethylene glycol, 17 g, Daily PRN  acetaminophen, 650 mg, Q6H PRN   Or  acetaminophen, 650 mg, Q6H PRN  famotidine, 20 mg, BID PRN  ipratropium-albuterol, 1 ampule, Q4H PRN              Electronically signed by RENNY Oneil NP on 12/16/2021 at 5:51 PM

## 2021-12-17 VITALS
OXYGEN SATURATION: 94 % | TEMPERATURE: 96.8 F | DIASTOLIC BLOOD PRESSURE: 88 MMHG | HEART RATE: 70 BPM | HEIGHT: 70 IN | BODY MASS INDEX: 35.12 KG/M2 | RESPIRATION RATE: 16 BRPM | WEIGHT: 245.3 LBS | SYSTOLIC BLOOD PRESSURE: 137 MMHG

## 2021-12-17 LAB
ANION GAP SERPL CALCULATED.3IONS-SCNC: 3 MMOL/L (ref 4–16)
BUN BLDV-MCNC: 21 MG/DL (ref 6–23)
CALCIUM SERPL-MCNC: 9 MG/DL (ref 8.3–10.6)
CHLORIDE BLD-SCNC: 98 MMOL/L (ref 99–110)
CO2: 40 MMOL/L (ref 21–32)
CREAT SERPL-MCNC: 0.8 MG/DL (ref 0.9–1.3)
GFR AFRICAN AMERICAN: >60 ML/MIN/1.73M2
GFR NON-AFRICAN AMERICAN: >60 ML/MIN/1.73M2
GLUCOSE BLD-MCNC: 156 MG/DL (ref 70–99)
HCT VFR BLD CALC: 53.6 % (ref 42–52)
HEMOGLOBIN: 16.8 GM/DL (ref 13.5–18)
MCH RBC QN AUTO: 29.9 PG (ref 27–31)
MCHC RBC AUTO-ENTMCNC: 31.3 % (ref 32–36)
MCV RBC AUTO: 95.5 FL (ref 78–100)
PDW BLD-RTO: 12.2 % (ref 11.7–14.9)
PLATELET # BLD: 234 K/CU MM (ref 140–440)
PMV BLD AUTO: 9.7 FL (ref 7.5–11.1)
POTASSIUM SERPL-SCNC: 4.6 MMOL/L (ref 3.5–5.1)
RBC # BLD: 5.61 M/CU MM (ref 4.6–6.2)
SODIUM BLD-SCNC: 141 MMOL/L (ref 135–145)
WBC # BLD: 16.3 K/CU MM (ref 4–10.5)

## 2021-12-17 PROCEDURE — 85027 COMPLETE CBC AUTOMATED: CPT

## 2021-12-17 PROCEDURE — 2580000003 HC RX 258: Performed by: NURSE PRACTITIONER

## 2021-12-17 PROCEDURE — 6360000002 HC RX W HCPCS: Performed by: NURSE PRACTITIONER

## 2021-12-17 PROCEDURE — 94640 AIRWAY INHALATION TREATMENT: CPT

## 2021-12-17 PROCEDURE — 2500000003 HC RX 250 WO HCPCS: Performed by: NURSE PRACTITIONER

## 2021-12-17 PROCEDURE — 80048 BASIC METABOLIC PNL TOTAL CA: CPT

## 2021-12-17 PROCEDURE — 36415 COLL VENOUS BLD VENIPUNCTURE: CPT

## 2021-12-17 PROCEDURE — 94761 N-INVAS EAR/PLS OXIMETRY MLT: CPT

## 2021-12-17 PROCEDURE — 6370000000 HC RX 637 (ALT 250 FOR IP): Performed by: NURSE PRACTITIONER

## 2021-12-17 PROCEDURE — 2700000000 HC OXYGEN THERAPY PER DAY

## 2021-12-17 RX ORDER — DOXYCYCLINE HYCLATE 100 MG
100 TABLET ORAL 2 TIMES DAILY
Qty: 14 TABLET | Refills: 0 | Status: SHIPPED | OUTPATIENT
Start: 2021-12-17 | End: 2021-12-24

## 2021-12-17 RX ORDER — LANOLIN ALCOHOL/MO/W.PET/CERES
3 CREAM (GRAM) TOPICAL NIGHTLY PRN
Refills: 3 | COMMUNITY
Start: 2021-12-17

## 2021-12-17 RX ORDER — PREDNISONE 10 MG/1
TABLET ORAL
Qty: 46 TABLET | Refills: 0 | Status: SHIPPED | OUTPATIENT
Start: 2021-12-17 | End: 2022-01-10

## 2021-12-17 RX ADMIN — CARVEDILOL 6.25 MG: 6.25 TABLET, FILM COATED ORAL at 17:44

## 2021-12-17 RX ADMIN — DEXTROSE MONOHYDRATE 100 MG: 5 INJECTION INTRAVENOUS at 16:13

## 2021-12-17 RX ADMIN — SODIUM CHLORIDE 250 ML: 9 INJECTION, SOLUTION INTRAVENOUS at 02:57

## 2021-12-17 RX ADMIN — ALBUTEROL SULFATE 2 PUFF: 108 INHALANT RESPIRATORY (INHALATION) at 08:00

## 2021-12-17 RX ADMIN — SODIUM CHLORIDE 250 ML: 9 INJECTION, SOLUTION INTRAVENOUS at 16:12

## 2021-12-17 RX ADMIN — ENOXAPARIN SODIUM 40 MG: 100 INJECTION SUBCUTANEOUS at 09:38

## 2021-12-17 RX ADMIN — METHYLPREDNISOLONE SODIUM SUCCINATE 40 MG: 40 INJECTION, POWDER, FOR SOLUTION INTRAMUSCULAR; INTRAVENOUS at 16:14

## 2021-12-17 RX ADMIN — IPRATROPIUM BROMIDE 2 PUFF: 17 AEROSOL, METERED RESPIRATORY (INHALATION) at 08:00

## 2021-12-17 RX ADMIN — AMLODIPINE BESYLATE 5 MG: 5 TABLET ORAL at 09:38

## 2021-12-17 RX ADMIN — CARVEDILOL 6.25 MG: 6.25 TABLET, FILM COATED ORAL at 09:44

## 2021-12-17 RX ADMIN — ASPIRIN 81 MG: 81 TABLET, COATED ORAL at 09:38

## 2021-12-17 RX ADMIN — DEXTROSE MONOHYDRATE 100 MG: 5 INJECTION INTRAVENOUS at 02:58

## 2021-12-17 RX ADMIN — METHYLPREDNISOLONE SODIUM SUCCINATE 40 MG: 40 INJECTION, POWDER, FOR SOLUTION INTRAMUSCULAR; INTRAVENOUS at 09:38

## 2021-12-17 RX ADMIN — METHYLPREDNISOLONE SODIUM SUCCINATE 40 MG: 40 INJECTION, POWDER, FOR SOLUTION INTRAMUSCULAR; INTRAVENOUS at 02:58

## 2021-12-17 NOTE — DISCHARGE SUMMARY
Discharge Summary    Name:  Gloria Rod /Age/Sex: 1965  (60 y.o. male)   MRN & CSN:  [id number] & [id number] Admission Date/Time: 2021  6:49 PM   Attending:  Nikolas Lares MD Discharging Physician: RENNY Schultz NP     Hospital Course:     Gloria Rod is a 60 y.o. male who presents with increased fatigue over the last several days. His wife took his pulse ox and reported it was around 70% although she was concerned the patient still went to work. 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 He reports chronic hx of COPD still smokes, and has ocassional chronic chest pain especially when coughing that is mid sternal and does not radiate. He says he has noticed over the last week he is more SOB when laying flat or walking. He denied N/V/D fevers, He reprots his sputum color change recently to brown but that he has always coughed up sputum for years. He reports extensive heart hx including bypass but has not followed up with cardio in a long time time, he says they forgot about him. He also reports hx of HTN and high cholesterol and COPD for which he takes medication daily. Acute hypoxemic hypercapnic respiratory failure, most likely secondary to COPD exacerbation patient is now on 2 L nasal cannula he has had home O2 evaluation and will be discharged with oxygen. He will be sent home on doxycycline as well as prednisone taper dose. Continue bronchodilators. COPD exacerbation, treatment as above  Coronary artery disease, patient is having no chest pain at this time, he is status post CABG x4 vessels. Continue aspirin, atorvastatin. Ongoing tobacco abuse, nicotine patch, patient tells me he attempted to quit smoking approximately 9 months ago however he continues to smoke on and off. He tells me he smoked 2 cigarettes a day he came to the hospital.  Patient has been instructed to stop smoking.   Hypertension, continue current home medications  DVT prophylaxis with Lovenox  GI prophylaxis with Protonix  8. Leukocytosis, secondary to steroids. The patient expressed appropriate understanding of and agreement with the discharge recommendations, medications, and plan. Consults this admission:  Harjit Nagy HOSPITALIST    Discharge Instruction:   Follow up appointments: Schedule appointment with pulmonologist/ Follow up with your cardiologist  Primary care physician:  within 2 weeks    Diet:  low fat, low cholesterol diet   Activity: activity as tolerated  Disposition: Discharged to:   [x]Home, [][facility], []SNF, []Acute Rehab, []Hospice   Condition on discharge: Stable    Discharge Medications:        Medication List      START taking these medications    doxycycline hyclate 100 MG tablet  Commonly known as: VIBRA-TABS  Take 1 tablet by mouth 2 times daily for 7 days     melatonin 3 MG Tabs tablet  Take 1 tablet by mouth nightly as needed (sleep)     predniSONE 10 MG tablet  Commonly known as: DELTASONE  Take 4 tablets by mouth daily for 4 days, THEN 3 tablets daily for 4 days, THEN 2 tablets daily for 4 days, THEN 1 tablet daily for 4 days, THEN 1 tablet daily for 4 days, THEN 0.5 tablets daily for 4 days.   Start taking on: December 17, 2021        CONTINUE taking these medications    albuterol sulfate  (90 Base) MCG/ACT inhaler  INHALE 2 PUFFS INTO THE LUNGS EVERY 4 HOURS AS NEEDED FOR WHEEZING OR SHORTNESS OF BREATH     amLODIPine 5 MG tablet  Commonly known as: NORVASC  TAKE 1 TABLET BY MOUTH ONCE A DAY (GENERIC NORVASC)     aspirin 81 MG EC tablet  Take 1 tablet by mouth daily     carvedilol 6.25 MG tablet  Commonly known as: COREG  Take 1 tablet by mouth 2 times daily     tamsulosin 0.4 MG capsule  Commonly known as: FLOMAX  Take 1 capsule by mouth daily           Where to Get Your Medications      You can get these medications from any pharmacy    Bring a paper prescription for each of these medications  doxycycline hyclate 100 MG tablet  predniSONE 10 MG tablet  You don't need a prescription for these medications  melatonin 3 MG Tabs tablet         Objective Findings at Discharge:   BP (!) 140/82   Pulse 59   Temp 96.8 °F (36 °C) (Infrared)   Resp 16   Ht 5' 10\" (1.778 m)   Wt 245 lb 4.8 oz (111.3 kg)   SpO2 94%   BMI 35.20 kg/m²            PHYSICAL EXAM   GEN Awake male, sitting upright in bed in no apparent distress. Appears given age. EYES Pupils are equally round. No scleral erythema, discharge, or conjunctivitis. HENT Mucous membranes are moist. Oral pharynx without exudates, no evidence of thrush. NECK Supple, no apparent thyromegaly or masses. RESP breath sounds are diminished bilaterally patient is on 2 L nasal cannula O2 saturation of 96%  CARDIO/VASC S1/S2 auscultated. Regular rate without appreciable murmurs, rubs, or gallops. No JVD or carotid bruits. Peripheral pulses equal bilaterally and palpable. No peripheral edema. GI Abdomen is soft without significant tenderness, masses, or guarding. Bowel sounds are normoactive.  No costovertebral angle tenderness. MSK No gross joint deformities. SKIN Normal coloration, warm, dry. NEURO Cranial nerves appear grossly intact, normal speech, no lateralizing weakness. PSYCH Awake, alert, oriented x 4. Affect appropriate. BMP/CBC  Recent Labs     12/16/21  0600 12/17/21  0600    141   K 4.4 4.6   CL 98* 98*   CO2 36* 40*   BUN 21 21   CREATININE 0.8* 0.8*   WBC 18.3* 16.3*   HCT 55.4* 53.6*    234       IMAGING:  EXAMINATION:   CTA OF THE CHEST 12/13/2021 10:02 pm       TECHNIQUE:   CTA of the chest was performed after the administration of intravenous   contrast.  Multiplanar reformatted images are provided for review.  MIP   images are provided for review.  Dose modulation, iterative reconstruction,   and/or weight based adjustment of the mA/kV was utilized to reduce the   radiation dose to as low as reasonably achievable.     COMPARISON:   None.       HISTORY:   ORDERING SYSTEM PROVIDED HISTORY: Hypoxia   TECHNOLOGIST PROVIDED HISTORY:   Reason for exam:->Hypoxia   Decision Support Exception - unselect if not a suspected or confirmed   emergency medical condition->Emergency Medical Condition (MA)   Reason for Exam: Hypoxia, PE?, 75 ml isovue 370   Additional signs and symptoms: Hypoxia, PE?, 75 ml isovue 370       FINDINGS:   Pulmonary Arteries: Pulmonary arteries are adequately opacified for   evaluation.  No evidence of intraluminal filling defect to suggest pulmonary   embolism.  Main pulmonary artery is normal in caliber.       Mediastinum: No evidence of mediastinal lymphadenopathy.  The heart and   pericardium demonstrate no acute abnormality.  There is no acute abnormality   of the thoracic aorta.       Lungs/pleura: The lungs are without acute process.  No focal consolidation or   pulmonary edema.  No evidence of pleural effusion or pneumothorax.       Upper Abdomen: Limited images of the upper abdomen are unremarkable.       Soft Tissues/Bones: Mild superior endplate compression of the T5, T6, and T8   vertebral bodies, most likely chronic in nature.           Impression   No evidence of pulmonary embolism or acute pulmonary abnormality.       RECOMMENDATIONS:   Unavailable           TTE procedure:ECHOCARDIOGRAM COMPLETE 2D W DOPPLER W COLOR. Procedure Date  Date: 12/14/2021 Start: 08:02 AM  Study Location: Portable  Technical Quality: Poor visualization  Indications:Dyspnea/SOB. Patient Status: Routine  Height: 70 inches Weight: 245 pounds BSA: 2.28 m2 BMI: 35.15 kg/m2     HR: 73 bpm BP: 136/80 mmHg  Conclusions  Summary  Technically difficult examination. Left ventricular systolic function is normal.   Ejection fraction is visually estimated at 50-55%. Mild left ventricular hypertrophy with grade I diastolic dysfunction. No significant valvular disease noted. No evidence of any pericardial effusion.       Discharge Time of 35  minutes    Electronically signed by RENNY Gibson NP on 12/17/2021 at 10:14 AM

## 2021-12-17 NOTE — PROGRESS NOTES
Discharge instructions explained to pt, no questions or concerns at this time. Peripheral Iv removed. Prescriptions given top pt. Pt went home with home oxygen. Follow-up appt scheduled for 12-23-21 with PCP.

## 2021-12-17 NOTE — PLAN OF CARE
Problem: Discharge Planning:  Goal: Discharged to appropriate level of care  Description: Discharged to appropriate level of care  12/17/2021 1728 by Anayeli Higginbotham RN  Outcome: Completed  12/17/2021 1009 by Anayeli Higginbotham RN  Outcome: Ongoing     Problem:  Activity Intolerance:  Goal: Ability to tolerate increased activity will improve  Description: Ability to tolerate increased activity will improve  12/17/2021 1728 by Anayeli Higginbotham RN  Outcome: Completed  12/17/2021 1009 by Anayeli Higginbotham RN  Outcome: Ongoing     Problem: Airway Clearance - Ineffective:  Goal: Ability to maintain a clear airway will improve  Description: Ability to maintain a clear airway will improve  12/17/2021 1728 by Anayeli Higginbotham RN  Outcome: Completed  12/17/2021 1009 by Anayeli Higginbotham RN  Outcome: Ongoing     Problem: Breathing Pattern - Ineffective:  Goal: Ability to achieve and maintain a regular respiratory rate will improve  Description: Ability to achieve and maintain a regular respiratory rate will improve  12/17/2021 1728 by Anayeli Higginbotham RN  Outcome: Completed  12/17/2021 1009 by Anayeli Higginbotham RN  Outcome: Ongoing     Problem: Gas Exchange - Impaired:  Goal: Levels of oxygenation will improve  Description: Levels of oxygenation will improve  12/17/2021 1728 by Anayeli Higginbotham RN  Outcome: Completed  12/17/2021 1009 by Anayeli Higginbotham RN  Outcome: Ongoing

## 2021-12-23 ENCOUNTER — OFFICE VISIT (OUTPATIENT)
Dept: INTERNAL MEDICINE CLINIC | Age: 56
End: 2021-12-23
Payer: COMMERCIAL

## 2021-12-23 VITALS
HEART RATE: 72 BPM | RESPIRATION RATE: 16 BRPM | DIASTOLIC BLOOD PRESSURE: 72 MMHG | OXYGEN SATURATION: 92 % | SYSTOLIC BLOOD PRESSURE: 120 MMHG | BODY MASS INDEX: 34.52 KG/M2 | WEIGHT: 240.6 LBS | TEMPERATURE: 96.5 F

## 2021-12-23 DIAGNOSIS — J44.1 COPD EXACERBATION (HCC): ICD-10-CM

## 2021-12-23 DIAGNOSIS — E78.2 MIXED HYPERLIPIDEMIA: ICD-10-CM

## 2021-12-23 DIAGNOSIS — I10 ESSENTIAL HYPERTENSION: ICD-10-CM

## 2021-12-23 DIAGNOSIS — J44.9 COPD, SEVERE (HCC): Primary | ICD-10-CM

## 2021-12-23 DIAGNOSIS — I71.40 ABDOMINAL AORTIC ANEURYSM (AAA) WITHOUT RUPTURE: ICD-10-CM

## 2021-12-23 DIAGNOSIS — Z72.0 TOBACCO ABUSE DISORDER: ICD-10-CM

## 2021-12-23 DIAGNOSIS — I25.10 CORONARY ARTERY DISEASE INVOLVING NATIVE CORONARY ARTERY OF NATIVE HEART WITHOUT ANGINA PECTORIS: ICD-10-CM

## 2021-12-23 DIAGNOSIS — I77.9 BILATERAL CAROTID ARTERY DISEASE, UNSPECIFIED TYPE (HCC): ICD-10-CM

## 2021-12-23 DIAGNOSIS — R29.818 SUSPECTED SLEEP APNEA: ICD-10-CM

## 2021-12-23 PROCEDURE — G8482 FLU IMMUNIZE ORDER/ADMIN: HCPCS | Performed by: INTERNAL MEDICINE

## 2021-12-23 PROCEDURE — G8417 CALC BMI ABV UP PARAM F/U: HCPCS | Performed by: INTERNAL MEDICINE

## 2021-12-23 PROCEDURE — 99214 OFFICE O/P EST MOD 30 MIN: CPT | Performed by: INTERNAL MEDICINE

## 2021-12-23 PROCEDURE — G8926 SPIRO NO PERF OR DOC: HCPCS | Performed by: INTERNAL MEDICINE

## 2021-12-23 PROCEDURE — 4004F PT TOBACCO SCREEN RCVD TLK: CPT | Performed by: INTERNAL MEDICINE

## 2021-12-23 PROCEDURE — 3023F SPIROM DOC REV: CPT | Performed by: INTERNAL MEDICINE

## 2021-12-23 PROCEDURE — G8427 DOCREV CUR MEDS BY ELIG CLIN: HCPCS | Performed by: INTERNAL MEDICINE

## 2021-12-23 PROCEDURE — 3017F COLORECTAL CA SCREEN DOC REV: CPT | Performed by: INTERNAL MEDICINE

## 2021-12-23 PROCEDURE — 1111F DSCHRG MED/CURRENT MED MERGE: CPT | Performed by: INTERNAL MEDICINE

## 2021-12-23 RX ORDER — ALBUTEROL SULFATE 90 UG/1
AEROSOL, METERED RESPIRATORY (INHALATION)
Qty: 18 G | Refills: 1 | Status: SHIPPED | OUTPATIENT
Start: 2021-12-23 | End: 2022-07-01 | Stop reason: SDUPTHER

## 2021-12-23 NOTE — ASSESSMENT & PLAN NOTE
Patient had cystoscopy and the anesthesiologist felt patient has sleep apnea. Bang score was 5.   Discussed to get sleep study : wants to wait  Send for apnea link  Pt is suspected to have sleep apnea  He was referred 2 times but pt did not see pulmonology  We will refer again

## 2021-12-23 NOTE — ASSESSMENT & PLAN NOTE
10/2016: Left ICA 50-69% and right ICA less than 50%   Dr Cheryle Pérez following that. Recheck in 6/17 per pt. But pt states it was cancelled.  Pt will call and set appt  Carotid doppler : 7/2018 :  bilateral 0-49 %

## 2021-12-23 NOTE — ASSESSMENT & PLAN NOTE
CT abdomen in 5/2019 : 3.3 cm : seen Dr Darrell Garcia.  Recheck in one year   CT abdomen 2/2021 : 3.5-3.9 cm : recheck in 2 years

## 2021-12-23 NOTE — ASSESSMENT & PLAN NOTE
PFTs done 6/2019 : severe obstructive disease. Patient is taking albuterol inhaler and incruse (could not afford it)  Trelegy ws ordered. Pt stopped it because of cost.  Using albuterol inhaler. Had exacerbation of COPD 12/21  And a trelegy. Continue albuterol inhaler and oxygen  Refer patient to pulmonology: For COPD and sleep apnea  Patient is another PFT after he improves.

## 2021-12-23 NOTE — ASSESSMENT & PLAN NOTE
Patient had exacerbation of COPD  Finish doxycycline and prednisone  Continue albuterol inhaler and samples of Trelegy given to patient for a month supply  Check O2 saturation at home

## 2021-12-23 NOTE — ASSESSMENT & PLAN NOTE
Had MI in 10/16,Cath revealed 100% occluded RCA, 90% LAD and circumflex disease.   CABG x 4 on 10/24/16: Dr Shelia Hernandez,  LAWSON to LAd and D1  SVG to PDA  svg to ramus  On statin, ASA, BB(coreg), ACE (lisinopril)  Sees cardiologist Dr Melissa Ordonez

## 2021-12-23 NOTE — ASSESSMENT & PLAN NOTE
She states he quit smoking since his admission to hospital.  Advised patient to quit completely. Options given.

## 2021-12-23 NOTE — PROGRESS NOTES
Post-Discharge Transitional Care Management Services or Hospital Follow Up      Terrie Singh   YOB: 1965    Date of Office Visit:  12/23/2021  Date of Hospital Admission: 12/13/21  Date of Hospital Discharge: 12/17/21  Risk of hospital readmission (high >=14%. Medium >=10%) :Readmission Risk Score: 5.4 ( )      Care management risk score Rising risk (score 2-5) and Complex Care (Scores >=6): 2     Non face to face  following discharge, date last encounter closed (first attempt may have been earlier): 12/16/2021  9:34 AM    Call initiated 2 business days of discharge: Yes    Patient Active Problem List   Diagnosis    Essential hypertension    Mixed hyperlipidemia    Tobacco abuse disorder    Incidental lung nodule 4 mm RUL. f/u in 10/15    Kidney stone on right side    Coronary artery disease s/p MI, CABG x 4    Bilateral carotid artery disease ([address]    Abdominal aortic aneurysm (AAA) without rupture ([address]    Suspected sleep apnea    COPD, severe ([address]    Weight gain    Amputation, toe, traumatic with complication, right, initial encounter ([address]    S/P split thickness skin graft    Epistaxis    Slow transit constipation    COPD exacerbation (HCC)       Allergies   Allergen Reactions    Guaifenesin Er Diarrhea    Sulfa Antibiotics Rash       Medications listed as ordered at the time of discharge from hospital     Medication List          Accurate as of December 23, 2021 10:24 AM. If you have any questions, ask your nurse or doctor.             CONTINUE taking these medications    albuterol sulfate  (90 Base) MCG/ACT inhaler  INHALE 2 PUFFS INTO THE LUNGS EVERY 4 HOURS AS NEEDED FOR WHEEZING OR SHORTNESS OF BREATH     amLODIPine 5 MG tablet  Commonly known as: NORVASC  TAKE 1 TABLET BY MOUTH ONCE A DAY (GENERIC NORVASC)     aspirin 81 MG EC tablet  Take 1 tablet by mouth daily     carvedilol 6.25 MG tablet  Commonly known as: COREG  Take 1 tablet by mouth 2 times daily doxycycline hyclate 100 MG tablet  Commonly known as: VIBRA-TABS  Take 1 tablet by mouth 2 times daily for 7 days     melatonin 3 MG Tabs tablet  Take 1 tablet by mouth nightly as needed (sleep)     predniSONE 10 MG tablet  Commonly known as: DELTASONE  Take 4 tablets by mouth daily for 4 days, THEN 3 tablets daily for 4 days, THEN 2 tablets daily for 4 days, THEN 1 tablet daily for 4 days, THEN 1 tablet daily for 4 days, THEN 0.5 tablets daily for 4 days. Start taking on: December 17, 2021     tamsulosin 0.4 MG capsule  Commonly known as: FLOMAX  Take 1 capsule by mouth daily              Medications marked \"taking\" at this time  Outpatient Medications Marked as Taking for the 12/23/21 encounter (Office Visit) with Dez Salvador MD   Medication Sig Dispense Refill    doxycycline hyclate (VIBRA-TABS) 100 MG tablet Take 1 tablet by mouth 2 times daily for 7 days 14 tablet 0    predniSONE (DELTASONE) 10 MG tablet Take 4 tablets by mouth daily for 4 days, THEN 3 tablets daily for 4 days, THEN 2 tablets daily for 4 days, THEN 1 tablet daily for 4 days, THEN 1 tablet daily for 4 days, THEN 0.5 tablets daily for 4 days.  46 tablet 0    amLODIPine (NORVASC) 5 MG tablet TAKE 1 TABLET BY MOUTH ONCE A DAY (GENERIC NORVASC) 30 tablet 1    carvedilol (COREG) 6.25 MG tablet Take 1 tablet by mouth 2 times daily 60 tablet 5    tamsulosin (FLOMAX) 0.4 MG capsule Take 1 capsule by mouth daily 30 capsule 3    albuterol sulfate  (90 Base) MCG/ACT inhaler INHALE 2 PUFFS INTO THE LUNGS EVERY 4 HOURS AS NEEDED FOR WHEEZING OR SHORTNESS OF BREATH 18 g 1    aspirin 81 MG EC tablet Take 1 tablet by mouth daily 30 tablet 5        Medications patient taking as of now reconciled against medications ordered at time of hospital discharge: Yes    Chief Complaint   Patient presents with   [address] discharged 12-    Medication Refill    Tinnitus     getting worse     Patient's wife is here with the patient  History of Present illness - Follow up of Hospital diagnosis(es):   Patient was admitted to the hospital for drowsiness fatigue and hypoxemia. Patient had oxygen at home and was running 70% O2 sat and he was not feeling well  Was taken to the emergency room and was subsequently admitted for acute respiratory failure. Patient has a history of severe COPD and suspected sleep apnea. Patient had a CT scan of the chest which was negative. No PE. No congestive heart failure. Respiratory panel was negative for Covid and another viral infection. However patient was requiring oxygen. He was treated with steroids, antibiotics and nebulizer treatments with that he improved and was discharged on oxygen. He was put on doxycycline and prednisone on discharge along with albuterol inhaler. Inpatient course: Discharge summary reviewed- see chart. Interval history/Current status:     Since discharge from the hospital he is feeling better  He is breathing better. O2 saturation is around 92% on room air and 96 to 97% with oxygen. He is using albuterol inhaler and prednisone and is on doxycycline. Denies any cough. No fever or chills. No abdominal pain. No nausea vomiting or diarrhea. He has chronic tinnitus which is getting worse    A comprehensive review of systems was negative except for what was noted in the HPI. Vitals:    12/23/21 0930   BP: 120/72   Pulse: 72   Resp: 16   Temp: 96.5 °F (35.8 °C)   TempSrc: Temporal   SpO2: 92%   Weight: 240 lb 9.6 oz (109.1 kg)     Body mass index is 34.52 kg/m². Wt Readings from Last 3 Encounters:   12/23/21 240 lb 9.6 oz (109.1 kg)   12/17/21 245 lb 4.8 oz (111.3 kg)   11/29/21 245 lb 6.4 oz (111.3 kg)     BP Readings from Last 3 Encounters:   12/23/21 120/72   12/17/21 137/88   11/29/21 128/72        Physical Exam:  Patient is awake alert and oriented. Patient is not using oxygen in the office.   O2 sat is 92%  HEENT conjunctive are pink no scleral icterus  Neck supple there is no JVD  Heart S1-S2 heard  Lungs poor air entry bilaterally. No wheezing  Abdomen soft and nontender bowel sounds present  Extremities there is no pedal edema cyanosis or clubbing  CNS is intact    Assessment/Plan:      COPD exacerbation (HCC)  Patient had exacerbation of COPD  Finish doxycycline and prednisone  Continue albuterol inhaler and samples of Trelegy given to patient for a month supply  Check O2 saturation at home    COPD, severe ([facility]    PFTs done 6/2019 : severe obstructive disease. Patient is taking albuterol inhaler and incruse (could not afford it)  Trelegy ws ordered. Pt stopped it because of cost.  Using albuterol inhaler. Had exacerbation of COPD 12/21  And a trelegy. Continue albuterol inhaler and oxygen  Refer patient to pulmonology: For COPD and sleep apnea  Patient is another PFT after he improves. Suspected sleep apnea    Patient had cystoscopy and the anesthesiologist felt patient has sleep apnea. Bang score was 5. Discussed to get sleep study : wants to wait  Send for apnea link  Pt is suspected to have sleep apnea  He was referred 2 times but pt did not see pulmonology  We will refer again    Abdominal aortic aneurysm (AAA) without rupture ([facility]    CT abdomen in 5/2019 : 3.3 cm : seen Dr Darrell Garcia. Recheck in one year   CT abdomen 2/2021 : 3.5-3.9 cm : recheck in 2 years     Bilateral carotid artery disease ([facility]    10/2016: Left ICA 50-69% and right ICA less than 50%   Dr Janine Khan following that. Recheck in 6/17 per pt. But pt states it was cancelled. Pt will call and set appt  Carotid doppler : 7/2018 :  bilateral 0-49 %    Coronary artery disease s/p MI, CABG x 4   Had MI in 10/16,Cath revealed 100% occluded RCA, 90% LAD and circumflex disease.   CABG x 4 on 10/24/16: Dr Janine Khan,  LIMA to LAd and D1  SVG to PDA  svg to ramus  On statin, ASA, BB(coreg), ACE (lisinopril)  Sees cardiologist Dr Saenz Erm abuse disorder    Pt. states he quit smoking since his admission to hospital.  Advised patient to quit completely. Options given. Mixed hyperlipidemia   . Patient has hyperlipidemia. Continue atorvastatin. Essential hypertension   . Blood pressure in control.   Continue amlodipine and carvedilol    Orders Placed This Encounter   Procedures   Michael Gates , CNP, Pulmonology, [address] W/ CURRENT OUTPATIENT MED LIST       Medical Decision Making: moderate complexity

## 2022-01-25 ENCOUNTER — OFFICE VISIT (OUTPATIENT)
Dept: INTERNAL MEDICINE CLINIC | Age: 57
End: 2022-01-25
Payer: COMMERCIAL

## 2022-01-25 VITALS
SYSTOLIC BLOOD PRESSURE: 118 MMHG | RESPIRATION RATE: 16 BRPM | DIASTOLIC BLOOD PRESSURE: 78 MMHG | HEART RATE: 80 BPM | BODY MASS INDEX: 38.31 KG/M2 | TEMPERATURE: 97.2 F | OXYGEN SATURATION: 94 % | WEIGHT: 267 LBS

## 2022-01-25 DIAGNOSIS — I77.9 BILATERAL CAROTID ARTERY DISEASE, UNSPECIFIED TYPE (HCC): ICD-10-CM

## 2022-01-25 DIAGNOSIS — I71.40 ABDOMINAL AORTIC ANEURYSM (AAA) WITHOUT RUPTURE: ICD-10-CM

## 2022-01-25 DIAGNOSIS — Z72.0 TOBACCO ABUSE DISORDER: ICD-10-CM

## 2022-01-25 DIAGNOSIS — I10 ESSENTIAL HYPERTENSION: ICD-10-CM

## 2022-01-25 DIAGNOSIS — R29.818 SUSPECTED SLEEP APNEA: ICD-10-CM

## 2022-01-25 DIAGNOSIS — J44.1 COPD EXACERBATION (HCC): ICD-10-CM

## 2022-01-25 DIAGNOSIS — E78.2 MIXED HYPERLIPIDEMIA: ICD-10-CM

## 2022-01-25 DIAGNOSIS — S98.131A: ICD-10-CM

## 2022-01-25 DIAGNOSIS — J44.9 COPD, SEVERE (HCC): Primary | ICD-10-CM

## 2022-01-25 DIAGNOSIS — I25.10 CORONARY ARTERY DISEASE INVOLVING NATIVE CORONARY ARTERY OF NATIVE HEART WITHOUT ANGINA PECTORIS: ICD-10-CM

## 2022-01-25 DIAGNOSIS — R63.5 WEIGHT GAIN: ICD-10-CM

## 2022-01-25 PROCEDURE — 3023F SPIROM DOC REV: CPT | Performed by: INTERNAL MEDICINE

## 2022-01-25 PROCEDURE — 3017F COLORECTAL CA SCREEN DOC REV: CPT | Performed by: INTERNAL MEDICINE

## 2022-01-25 PROCEDURE — 99213 OFFICE O/P EST LOW 20 MIN: CPT | Performed by: INTERNAL MEDICINE

## 2022-01-25 PROCEDURE — G8482 FLU IMMUNIZE ORDER/ADMIN: HCPCS | Performed by: INTERNAL MEDICINE

## 2022-01-25 PROCEDURE — G8417 CALC BMI ABV UP PARAM F/U: HCPCS | Performed by: INTERNAL MEDICINE

## 2022-01-25 PROCEDURE — G8427 DOCREV CUR MEDS BY ELIG CLIN: HCPCS | Performed by: INTERNAL MEDICINE

## 2022-01-25 PROCEDURE — 1036F TOBACCO NON-USER: CPT | Performed by: INTERNAL MEDICINE

## 2022-01-25 RX ORDER — TAMSULOSIN HYDROCHLORIDE 0.4 MG/1
0.4 CAPSULE ORAL DAILY
Qty: 90 CAPSULE | Refills: 1 | Status: SHIPPED | OUTPATIENT
Start: 2022-01-25 | End: 2022-07-01 | Stop reason: SDUPTHER

## 2022-01-25 RX ORDER — FLUTICASONE FUROATE, UMECLIDINIUM BROMIDE AND VILANTEROL TRIFENATATE 100; 62.5; 25 UG/1; UG/1; UG/1
1 POWDER RESPIRATORY (INHALATION) DAILY
Qty: 1 EACH | Refills: 3 | Status: SHIPPED | OUTPATIENT
Start: 2022-01-25 | End: 2022-03-25 | Stop reason: SDUPTHER

## 2022-01-25 RX ORDER — AMLODIPINE BESYLATE 5 MG/1
5 TABLET ORAL DAILY
Qty: 90 TABLET | Refills: 1 | Status: SHIPPED | OUTPATIENT
Start: 2022-01-25 | End: 2022-07-01 | Stop reason: SDUPTHER

## 2022-01-25 RX ORDER — OLOPATADINE HYDROCHLORIDE 1 MG/ML
1 SOLUTION/ DROPS OPHTHALMIC 2 TIMES DAILY
Qty: 1 EACH | Refills: 0 | Status: SHIPPED | OUTPATIENT
Start: 2022-01-25 | End: 2022-02-24

## 2022-01-25 RX ORDER — CARVEDILOL 6.25 MG/1
6.25 TABLET ORAL 2 TIMES DAILY
Qty: 60 TABLET | Refills: 5 | Status: SHIPPED | OUTPATIENT
Start: 2022-01-25 | End: 2022-07-01 | Stop reason: SDUPTHER

## 2022-01-25 NOTE — PROGRESS NOTES
Yuliya Paez  Patient's  is 1965  Seen in office on 2022      SUBJECTIVE:  Jose stark 60 y. o.year old male presents today   Chief Complaint   Patient presents with    Follow-up    Medication Refill     Pt is feeling better  He had exac of copd which has improved. Using O2 at night only  O2 sat : at home 93-94 %  He is suscepted to have sleep apnea  Has appointment with pulmonologist Dr Caitlyn Coleman  Patient states he is feeling better overall. Denies any chest pain. No shortness of breath at rest.  No cough or sputum production. No abdominal pain. No nausea vomiting or diarrhea  Taking medications regularly. No side effects noted. Review of Systems  Review of system normal except as in  OBJECTIVE: /78   Pulse 80   Temp 97.2 °F (36.2 °C) (Temporal)   Resp 16   Wt 267 lb (121.1 kg)   SpO2 94%   BMI 38.31 kg/m²     Wt Readings from Last 3 Encounters:   22 267 lb (121.1 kg)   21 240 lb 9.6 oz (109.1 kg)   21 245 lb 4.8 oz (111.3 kg)        Patient was seen taking COVID-19 precautions. Face mask, gloves were used. Patient also wore facemask. GENERAL:  Alert, oriented, pleasant, in no apparent distress. HEENT:  Conjunctiva pink, no scleral icterus. ENT clear. NECK: Supple. No jugular venous distention noted. No masses felt,  CARDIOVASCULAR:  Normal S1 and S2    PULMONARY:  No respiratory distress. No wheezes or rales. Poor air entry bilaterally . ABDOMEN:  Soft and non-tender,no masses  ororganomegaly. EXTREMITIES:  No cyanosis, clubbing, or significant edema. SKIN: Skin is warm and dry. NEUROLOGICAL:  Cranial nerves II through XII are grossly intact. IMPRESSION:    Encounter Diagnoses   Name Primary?     COPD, severe ([address] Yes    Essential hypertension     Mixed hyperlipidemia     Tobacco abuse disorder     Coronary artery disease s/p MI, CABG x 4     Bilateral carotid artery disease, unspecified type ([address]     Abdominal aortic aneurysm (AAA) without rupture (HCC)     Suspected sleep apnea     Amputation, toe, traumatic with complication, right, initial encounter ([facility]     Weight gain     COPD exacerbation (HCC)        ASSESSMENT/PLAN:    COPD exacerbation ([facility]  Patient had exacerbation of COPD . It has resolved. Continue albuterol inhaler and samples of Trelegy given to patient for a month supply  O2 sat at home 93-94 %. Pt is using at night     COPD, severe ([facility]    PFTs done 6/2019 : severe obstructive disease. Patient is taking albuterol inhaler and incruse (could not afford it)  Trelegy ws ordered. Pt stopped it because of cost.  Using albuterol inhaler. Had exacerbation of COPD 12/21  And a trelegy. Continue albuterol inhaler and oxygen  Refer patient to pulmonology: For COPD and sleep apnea : has appt with Dr Colleen Hidalgo   Patient is another PFT after he improves.     Suspected sleep apnea    Patient had cystoscopy and the anesthesiologist felt patient has sleep apnea. Bang score was 5. Discussed to get sleep study : wants to wait  Send for apnea link  Pt is suspected to have sleep apnea  He was referred 2 times but pt did not see pulmonology  Has appt.      Abdominal aortic aneurysm (AAA) without rupture ([facility]    CT abdomen in 5/2019 : 3.3 cm : seen Dr Janie Varghese. Recheck in one year   CT abdomen 2/2021 : 3.5-3.9 cm : recheck in 2 years      Bilateral carotid artery disease ([facility]    10/2016: Left ICA 50-69% and right ICA less than 50%   Dr Estella Burnham following that. Recheck in 6/17 per pt. But pt states it was cancelled. Pt will call and set appt  Carotid doppler : 7/2018 :  bilateral 0-49 %     Coronary artery disease s/p MI, CABG x 4   Had MI in 10/16,Cath revealed 100% occluded RCA, 90% LAD and circumflex disease.   CABG x 4 on 10/24/16: Dr Estella Burnham,  LIMA to LAd and D1  SVG to PDA  svg to ramus  On statin, ASA, BB(coreg), ACE (lisinopril)  Sees cardiologist Dr Akanksha Jasso abuse disorder    Pt. states he quit smoking since his admission to hospital. Congratulation .     Mixed hyperlipidemia   . Patient has hyperlipidemia. Continue atorvastatin.     Essential hypertension   . Blood pressure in control. Continue amlodipine and carvedilol    Return to office in 2-month        Mediations reviewed with the patient. Continue current medications. Appropriate prescriptions are addressed. After visit summeryprovided. Follow up as directed sooner if needed. Questions answered and patient verbalizes understanding. Call for any problems, questions, or concerns. Allergies   Allergen Reactions    Guaifenesin Er Diarrhea    Sulfa Antibiotics Rash     Current Outpatient Medications   Medication Sig Dispense Refill    albuterol sulfate  (90 Base) MCG/ACT inhaler INHALE 2 PUFFS INTO THE LUNGS EVERY 4 HOURS AS NEEDED FOR WHEEZING OR SHORTNESS OF BREATH 18 g 1    amLODIPine (NORVASC) 5 MG tablet TAKE 1 TABLET BY MOUTH ONCE A DAY (GENERIC NORVASC) 30 tablet 1    carvedilol (COREG) 6.25 MG tablet Take 1 tablet by mouth 2 times daily 60 tablet 5    tamsulosin (FLOMAX) 0.4 MG capsule Take 1 capsule by mouth daily 30 capsule 3    aspirin 81 MG EC tablet Take 1 tablet by mouth daily 30 tablet 5    melatonin 3 MG TABS tablet Take 1 tablet by mouth nightly as needed (sleep) (Patient not taking: Reported on 12/23/2021)  3     No current facility-administered medications for this visit. Past Medical History:   Diagnosis Date    Abdominal aortic aneurysm (AAA) without rupture ([address] 4/18/2018    CT abdomen 4/2018 showed 3.2 cm AAA. It was normal on CT abdomen in 10/2016 4/8/2019 US : 4.5 x 4.3 cm size : increased significantly. Vascular consultation and recheck in one year. CT abdomen in 5/2019 : 3.3 cm : seen Dr Troy Mendoza. Recheck in one year     Acute gout of left ankle 2/2/2017    Resolved.  Uric acid improved after d/c HCTZ    Bilateral carotid artery disease ([address] 11/4/2016    10/2016: Left ICA 50-69% and right ICA less than 50%  Dr Ethel Nye following that. Recheck in  per pt. But pt states it was cancelled. Pt will call and set appt Carotid doppler : 2018 :  bilateral 0-49 %    COPD, severe ([address] 2019    PFTs done 2019 : severe obstructive disease.  Coronary artery disease involving native coronary artery of native heart without angina pectoris 2016    CABG x 4 on 16: Dr Moffett Channel H/O colonoscopy 3-30-15    Dr. Radhika Llamas, rtoin 5 yrs  (polypectomy)    H/O Doppler ultrasound 2018    Carotid Doppler. Mild (0-49%) disease of bilateral internal carotid arteries, bilateral common carotid artery bulb to proximal ICA exhibits calcific plaque, left distal ICA end diastolic velocity is elevated but ratio is below 2.0, normal vertebral flow.  Hyperlipidemia     Hypertension     Incidental lung nodule 4 mm RUL. f/u in 10/15 2015    Ct shoulder showed 4 mm nodule in RUL in 4/15,CT chest 9/15 5mm. f/u in     Kidney stone on right side 2016    Large stone on right side on CT abdomen of 10/16    Nephrolithiasis     Right shoulder pain 3/17/2015    Pt had seen Dr. Yamilet Zhao and had arthrogram and CT shoulder. Showed possible tear of biceps tendon. Conservative treatment,     Squamous papilloma 2/23/15    benign left oropharynx/nasopharynx Dr Kodi Corrales abuse disorder 2015    Pt has quit on 10/20/16     Past Surgical History:   Procedure Laterality Date    CARDIAC SURGERY  10/24/2016     CABG x4 SVG to RCA and Ramus, LIMA to LAD, DIAG sequ    COLONOSCOPY  2015    Dr. Radhika Llamas. 5yr    COLONOSCOPY      colonoscopy with polypectomy 2021 nathaly in 5 years.     HERNIA REPAIR      umbilical hernia    KIDNEY STONE SURGERY       Social History     Tobacco Use    Smoking status: Former Smoker     Packs/day: 1.00     Years: 1.00     Pack years: 1.00     Types: Cigarettes     Start date: 2017     Quit date: 2021     Years since quittin.1    Smokeless tobacco: Never Used   Substance Use Topics    Alcohol use:  Yes     Alcohol/week: 6.0 standard drinks     Types: 6 Standard drinks or equivalent per week     Comment: 3 or 4 beers per day, most days       LAB REVIEW:  CBC:   Lab Results   Component Value Date    WBC 16.3 12/17/2021    HGB 16.8 12/17/2021    HCT 53.6 12/17/2021     12/17/2021     Lipids:   Lab Results   Component Value Date    HDL 39 (L) 10/22/2021    LDLCALC 63 10/22/2021    LDLDIRECT 73 11/02/2018    TRIGLYCFAST 46 10/22/2021    CHOLFAST 111 10/22/2021     Renal:   Lab Results   Component Value Date    BUN 21 12/17/2021    CREATININE 0.8 12/17/2021     12/17/2021    K 4.6 12/17/2021    ALT 10 12/13/2021    AST 15 12/13/2021    GLUCOSE 156 12/17/2021    GLUF 117 02/01/2018     PT/INR:   Lab Results   Component Value Date    INR 1.07 10/24/2016     A1C:   Lab Results   Component Value Date    LABA1C 5.8 07/22/2020           Sajan Horowitz MD, 1/25/2022 , 9:16 AM

## 2022-02-11 ENCOUNTER — INITIAL CONSULT (OUTPATIENT)
Dept: PULMONOLOGY | Age: 57
End: 2022-02-11
Payer: COMMERCIAL

## 2022-02-11 VITALS
BODY MASS INDEX: 38.51 KG/M2 | HEIGHT: 70 IN | SYSTOLIC BLOOD PRESSURE: 110 MMHG | HEART RATE: 73 BPM | WEIGHT: 269 LBS | DIASTOLIC BLOOD PRESSURE: 72 MMHG | OXYGEN SATURATION: 96 %

## 2022-02-11 DIAGNOSIS — F17.210 CIGARETTE SMOKER: ICD-10-CM

## 2022-02-11 DIAGNOSIS — G47.33 OSA (OBSTRUCTIVE SLEEP APNEA): ICD-10-CM

## 2022-02-11 DIAGNOSIS — E66.9 OBESITY (BMI 30-39.9): ICD-10-CM

## 2022-02-11 DIAGNOSIS — G47.10 HYPERSOMNIA: ICD-10-CM

## 2022-02-11 DIAGNOSIS — Z01.818 PREOP TESTING: Primary | ICD-10-CM

## 2022-02-11 DIAGNOSIS — J44.9 COPD, SEVERE (HCC): ICD-10-CM

## 2022-02-11 PROCEDURE — 99204 OFFICE O/P NEW MOD 45 MIN: CPT | Performed by: INTERNAL MEDICINE

## 2022-02-11 PROCEDURE — G8417 CALC BMI ABV UP PARAM F/U: HCPCS | Performed by: INTERNAL MEDICINE

## 2022-02-11 PROCEDURE — G8427 DOCREV CUR MEDS BY ELIG CLIN: HCPCS | Performed by: INTERNAL MEDICINE

## 2022-02-11 PROCEDURE — G8482 FLU IMMUNIZE ORDER/ADMIN: HCPCS | Performed by: INTERNAL MEDICINE

## 2022-02-11 PROCEDURE — 1036F TOBACCO NON-USER: CPT | Performed by: INTERNAL MEDICINE

## 2022-02-11 PROCEDURE — 3017F COLORECTAL CA SCREEN DOC REV: CPT | Performed by: INTERNAL MEDICINE

## 2022-02-11 PROCEDURE — 3023F SPIROM DOC REV: CPT | Performed by: INTERNAL MEDICINE

## 2022-02-11 ASSESSMENT — SLEEP AND FATIGUE QUESTIONNAIRES
ESS TOTAL SCORE: 20
HOW LIKELY ARE YOU TO NOD OFF OR FALL ASLEEP WHILE SITTING AND READING: 3
HOW LIKELY ARE YOU TO NOD OFF OR FALL ASLEEP WHILE SITTING QUIETLY AFTER LUNCH WITHOUT ALCOHOL: 3
HOW LIKELY ARE YOU TO NOD OFF OR FALL ASLEEP WHILE WATCHING TV: 3
HOW LIKELY ARE YOU TO NOD OFF OR FALL ASLEEP WHEN YOU ARE A PASSENGER IN A CAR FOR AN HOUR WITHOUT A BREAK: 3
HOW LIKELY ARE YOU TO NOD OFF OR FALL ASLEEP WHILE SITTING INACTIVE IN A PUBLIC PLACE: 3
HOW LIKELY ARE YOU TO NOD OFF OR FALL ASLEEP WHILE LYING DOWN TO REST IN THE AFTERNOON WHEN CIRCUMSTANCES PERMIT: 3
HOW LIKELY ARE YOU TO NOD OFF OR FALL ASLEEP IN A CAR, WHILE STOPPED FOR A FEW MINUTES IN TRAFFIC: 1
NECK CIRCUMFERENCE (INCHES): 19
HOW LIKELY ARE YOU TO NOD OFF OR FALL ASLEEP WHILE SITTING AND TALKING TO SOMEONE: 1

## 2022-02-11 NOTE — ASSESSMENT & PLAN NOTE
He has more than 50 pk yr smoking  PFT  6 MWT  C/w quitting smoking  Low dose CT chest in [address] [place] [address] yearly flu vaccine  C/w Trelegy and Albuterol prn

## 2022-02-11 NOTE — PROGRESS NOTES
Joni Rene MD, [facility]      [address], [address] [facility]   PH: [phone]  F: [phone]     Subjective:     Patient ID: Horacio Boykin is a 60 y.o. male, referred to the sleep center for   Chief Complaint   Patient presents with    New Patient     referred by Dr. Yu Cortez Sleep Apnea     snoring at night, wakes himself up, gasps at times for breath   . Referring physician:  Elijah Bruner MD    History:has been referred for the JOSE MANUEL. His CTA done on 12/13/21 showed:  No evidence of pulmonary embolism or acute pulmonary abnormality.  He is on oxygen when sleeping    Symptoms:   [x]  Snoring                                                                    [x]  Dry Mouth  [x]  Choking                                                                   [x]  Morning Headaches  [x]  Gasping for Air                                                        []  Trouble Falling asleep  [x]  Tired during the daytime                                         []  Trouble Staying Asleep  [x]  Tired when you wake up                                         [x]  Weight Gain in Last 5 Years  [x]  Wake up frequently at night                                    []  Weight Loss in Last 5 Years  [x]  Shortness Of Breath                                               []  Shift Worker  [x]  Coughing                                                                [x]  Smoker (Previous or Current) 2 pks/day x 25 yrs quit Dec 2021  []  Chest Pain                                                              []  Anxiety  []  Trouble keeping your legs still at night                   [x]  Depression  []  Kicking your legs in your sleep                               []  Insomnia     [x] Palpitation  [x]   Stop breathing      []  Other:     Significant Co-morbidities:  []  Congestive Heart Failure     [x]  COPD         []  Stroke (Past 30 Days)      [x] Supplemental Oxygen Usage       []  Cognitive Impairment      []  Neuromuscular Problems  []  Epilepsy/Neurological Disorders   Has CAD s/p CABG x4          Social History     Socioeconomic History    Marital status:      Spouse name: Not on file    Number of children: 1    Years of education: 15    Highest education level: Not on file   Occupational History     Comment: Heavy equipment   Tobacco Use    Smoking status: Former Smoker     Packs/day: 1.00     Years: 1.00     Pack years: 1.00     Types: Cigarettes     Start date: 2017     Quit date: 2021     Years since quittin.1    Smokeless tobacco: Never Used   Substance and Sexual Activity    Alcohol use: Yes     Alcohol/week: 6.0 standard drinks     Types: 6 Standard drinks or equivalent per week     Comment: 3 or 4 beers per day, most days    Drug use: Yes     Types: Marijuana Lanis Huan)     Comment: 2 or 3 times per week    Sexual activity: Yes     Partners: Female   Other Topics Concern    Not on file   Social History Narrative    Not on file     Social Determinants of Health     Financial Resource Strain:     Difficulty of Paying Living Expenses: Not on file   Food Insecurity:     Worried About Running Out of Food in the Last Year: Not on file    Mauricio of Food in the Last Year: Not on file   Transportation Needs:     Lack of Transportation (Medical): Not on file    Lack of Transportation (Non-Medical):  Not on file   Physical Activity:     Days of Exercise per Week: Not on file    Minutes of Exercise per Session: Not on file   Stress:     Feeling of Stress : Not on file   Social Connections:     Frequency of Communication with Friends and Family: Not on file    Frequency of Social Gatherings with Friends and Family: Not on file    Attends Holiness Services: Not on file    Active Member of Clubs or Organizations: Not on file    Attends Club or Organization Meetings: Not on file    Marital Status: Not on file   Intimate Partner Violence:     Fear of Current or Ex-Partner: Not on file    Emotionally Abused: Not on file    Physically Abused: Not on file    Sexually Abused: Not on file   Housing Stability:     Unable to Pay for Housing in the Last Year: Not on file    Number of Francesca in the Last Year: Not on file    Unstable Housing in the Last Year: Not on file       Prior to Admission medications    Medication Sig Start Date End Date Taking? Authorizing Provider   tamsulosin (FLOMAX) 0.4 MG capsule Take 1 capsule by mouth daily 1/25/22  Yes Natasha Conde MD   amLODIPine (NORVASC) 5 MG tablet Take 1 tablet by mouth daily TAKE 1 TABLET BY MOUTH ONCE A DAY (Jacky Albert) 1/25/22  Yes Natasha Conde MD   carvedilol (COREG) 6.25 MG tablet Take 1 tablet by mouth 2 times daily 1/25/22  Yes Natasha Conde MD   fluticasone-umeclidin-vilant (TRELEGY ELLIPTA) 820-31.5-05 MCG/INH AEPB Inhale 1 puff into the lungs daily 1/25/22  Yes Natasha Conde MD   olopatadine (PATANOL) 0.1 % ophthalmic solution Place 1 drop into both eyes 2 times daily 1/25/22 2/24/22 Yes Natasha Conde MD   albuterol sulfate  (90 Base) MCG/ACT inhaler INHALE 2 PUFFS INTO THE LUNGS EVERY 4 HOURS AS NEEDED FOR WHEEZING OR SHORTNESS OF BREATH 12/23/21  Yes Natasha Conde MD   aspirin 81 MG EC tablet Take 1 tablet by mouth daily 1/3/17  Yes Natasha Conde MD   melatonin 3 MG TABS tablet Take 1 tablet by mouth nightly as needed (sleep)  Patient not taking: Reported on 12/23/2021 12/17/21   RENNY Anguiano - NP       Allergies as of 02/11/2022 - Fully Reviewed 02/11/2022   Allergen Reaction Noted    Guaifenesin er Diarrhea 12/14/2021    Sulfa antibiotics Rash 01/20/2015       Patient Active Problem List   Diagnosis    Essential hypertension    Mixed hyperlipidemia    Tobacco abuse disorder    Incidental lung nodule 4 mm RUL.  f/u in 10/15    Kidney stone on right side    Coronary artery disease s/p MI, CABG x 4    Bilateral carotid artery disease ([address]    Abdominal aortic aneurysm (AAA) without rupture ([address]    Suspected sleep apnea    COPD, severe ([address]    Weight gain    Amputation, toe, traumatic with complication, right, initial encounter ([address]    S/P split thickness skin graft    Epistaxis    Slow transit constipation    COPD exacerbation (HCC)    JOSE MANUEL (obstructive sleep apnea)    Obesity (BMI 30-39. 9)    Hypersomnia    Cigarette smoker       Past Medical History:   Diagnosis Date    Abdominal aortic aneurysm (AAA) without rupture ([address] 4/18/2018    CT abdomen 4/2018 showed 3.2 cm AAA. It was normal on CT abdomen in 10/2016 4/8/2019 US : 4.5 x 4.3 cm size : increased significantly. Vascular consultation and recheck in one year. CT abdomen in 5/2019 : 3.3 cm : seen Dr Vanesa Valenzuela. Recheck in one year     Acute gout of left ankle 2/2/2017    Resolved. Uric acid improved after d/c HCTZ    Bilateral carotid artery disease ([address] 11/4/2016    10/2016: Left ICA 50-69% and right ICA less than 50%  Dr Kong Tavarez following that. Recheck in 6/17 per pt. But pt states it was cancelled. Pt will call and set appt Carotid doppler : 7/2018 :  bilateral 0-49 %    COPD, severe ([address] 8/9/2019    PFTs done 6/2019 : severe obstructive disease.  Coronary artery disease involving native coronary artery of native heart without angina pectoris 11/04/2016    CABG x 4 on 9/24/16: Dr Buck Ledbetter H/O colonoscopy 3-30-15    Dr. Mini Murphy, rtoin 5 yrs  (polypectomy)    H/O Doppler ultrasound 07/19/2018    Carotid Doppler. Mild (0-49%) disease of bilateral internal carotid arteries, bilateral common carotid artery bulb to proximal ICA exhibits calcific plaque, left distal ICA end diastolic velocity is elevated but ratio is below 2.0, normal vertebral flow.  Hyperlipidemia     Hypertension     Incidental lung nodule 4 mm RUL. f/u in 10/15 4/22/2015    Ct shoulder showed 4 mm nodule in RUL in 4/15,CT chest 9/15 5mm.  f/u in 4/16    Kidney stone on right side 9/29/2016 Large stone on right side on CT abdomen of 10/16    Nephrolithiasis     Right shoulder pain 3/17/2015    Pt had seen Dr. Harriet Manrique and had arthrogram and CT shoulder. Showed possible tear of biceps tendon. Conservative treatment,     Squamous papilloma 2/23/15    benign left oropharynx/nasopharynx Dr Mcallister Sol abuse disorder 1/20/2015    Pt has quit on 10/20/16       Past Surgical History:   Procedure Laterality Date    CARDIAC SURGERY  10/24/2016     CABG x4 SVG to RCA and Ramus, LIMA to LAD, DIAG sequ    COLONOSCOPY  03/2015    Dr. Che Turner. 5yr    COLONOSCOPY      colonoscopy with polypectomy 6-7-2021 nathaly in 5 years.  HERNIA REPAIR      umbilical hernia    KIDNEY STONE SURGERY         Family History   Problem Relation Age of Onset    Cancer Mother         colon    Diabetes Mother     Coronary Art Dis Father         CABG age 72         Objective:   /72   Pulse 73   Ht 5' 10\" (1.778 m)   Wt 269 lb (122 kg)   SpO2 96%   BMI 38.60 kg/m²   Body mass index is 38.6 kg/m². Sleep Medicine 2/11/2022   Sitting and reading 3   Watching TV 3   Sitting, inactive in a public place (e.g. a theatre or a meeting) 3   As a passenger in a car for an hour without a break 3   Lying down to rest in the afternoon when circumstances permit 3   Sitting and talking to someone 1   Sitting quietly after a lunch without alcohol 3   In a car, while stopped for a few minutes in traffic 1   Total score 20   Neck circumference 19     {MALLAMPATI:3    Vitals:    02/11/22 1101   BP: 110/72   Pulse: 73   SpO2: 96%   Weight: 269 lb (122 kg)   Height: 5' 10\" (1.778 m)     Neck circumference: 19  Inches  Dorothy - Total score: 20    Gen: No distress. Eyes: PERRL. No sclera icterus. No conjunctival injection. ENT: No discharge. Pharynx clear. External appearance of ears and nose normal.  Neck: Trachea midline. No obvious mass. Resp: No accessory muscle use. No crackles. No wheezes. No rhonchi.  No dullness on percussion. CV: Regular rate. Regular rhythm. No murmur or rub. No edema. GI: Non-tender. Non-distended. No hernia. Skin: Warm, dry, normal texture and turgor. No nodule on exposed extremities. Lymph: No cervical LAD. No supraclavicular LAD. M/S: No cyanosis. No clubbing. No joint deformity. Psych: Oriented x 3. No anxiety. Awake. Alert. Intact judgement and insight. Mallampati Airway Classification:   []1 []2 [x]3 []4        Assessment and Plan     Diagnosis:    Problem List        Pulmonary Problems    COPD, severe ([facility]      He has more than 50 pk yr smoking  PFT  6 MWT  C/w quitting smoking  Low dose CT chest in [facility] yearly flu vaccine  C/w Trelegy and Albuterol prn           Relevant Medications    albuterol sulfate  (90 Base) MCG/ACT inhaler    fluticasone-umeclidin-vilant (TRELEGY ELLIPTA) 100-62.5-25 MCG/INH AEPB    Other Relevant Orders    Full PFT Study With Bronchodilator    6 Minute Walk Test    JOSE MANUEL (obstructive sleep apnea)      He has all the symptoms of JOSE MANUEL  Advised to go for the PSG'  Loose weight         Relevant Orders    Baseline Diagnostic Sleep Study       Other    Obesity (BMI 30-39. 9)      Advised to loose weight with diet and exercise           Hypersomnia      Advised to go for the PSG  Loose weight         Cigarette smoker      Advised to c/w quitting smoking         Relevant Orders    CT LUNG SCREENING    Full PFT Study With Bronchodilator    6 Minute Walk Test                Additional Plan:     [x]  Sleep hygiene/ relaxation methods & CBTi principles review with patient   [x]  Avoid supine/back sleep until sleep study   [x]  Driving precautions   [x]  Medical consequences of untreated JOSE MANUEL   [x]  Weight loss recommendations   [x]  Diet recommendations   [x]  Exercise   [x]  Advised to quit smoking       []  PFT referral   []  Bariatric Program referral      Follow-Up:    Return in about 4 weeks (around 3/11/2022) for PFT, Sleep Study, 6 MWT.     Electronically signed by Bakari Carty MD on 2/11/2022 at 11:26 AM

## 2022-02-21 ENCOUNTER — HOSPITAL ENCOUNTER (OUTPATIENT)
Age: 57
Discharge: HOME | End: 2022-02-21
Payer: COMMERCIAL

## 2022-02-21 PROCEDURE — U0005 INFEC AGEN DETEC AMPLI PROBE: HCPCS

## 2022-02-21 PROCEDURE — U0003 INFECTIOUS AGENT DETECTION BY NUCLEIC ACID (DNA OR RNA); SEVERE ACUTE RESPIRATORY SYNDROME CORONAVIRUS 2 (SARS-COV-2) (CORONAVIRUS DISEASE [COVID-19]), AMPLIFIED PROBE TECHNIQUE, MAKING USE OF HIGH THROUGHPUT TECHNOLOGIES AS DESCRIBED BY CMS-2020-01-R: HCPCS

## 2022-02-22 LAB
SARS-COV-2: NOT DETECTED
SOURCE: NORMAL

## 2022-02-28 ENCOUNTER — HOSPITAL ENCOUNTER (OUTPATIENT)
Dept: CARDIAC REHAB | Age: 57
Discharge: HOME | End: 2022-02-28
Payer: COMMERCIAL

## 2022-02-28 PROCEDURE — 94727 GAS DIL/WSHOT DETER LNG VOL: CPT

## 2022-02-28 PROCEDURE — 94060 EVALUATION OF WHEEZING: CPT

## 2022-02-28 PROCEDURE — 94729 DIFFUSING CAPACITY: CPT

## 2022-02-28 PROCEDURE — 94640 AIRWAY INHALATION TREATMENT: CPT

## 2022-02-28 PROCEDURE — 94760 N-INVAS EAR/PLS OXIMETRY 1: CPT

## 2022-03-23 ENCOUNTER — HOSPITAL ENCOUNTER (OUTPATIENT)
Dept: SLEEP CENTER | Age: 57
Discharge: HOME | End: 2022-03-23
Payer: COMMERCIAL

## 2022-03-23 VITALS — WEIGHT: 269 LBS | HEIGHT: 70 IN | BODY MASS INDEX: 38.51 KG/M2

## 2022-03-23 DIAGNOSIS — G47.33 OSA (OBSTRUCTIVE SLEEP APNEA): ICD-10-CM

## 2022-03-23 PROCEDURE — 95810 POLYSOM 6/> YRS 4/> PARAM: CPT

## 2022-03-23 ASSESSMENT — SLEEP AND FATIGUE QUESTIONNAIRES
HOW LIKELY ARE YOU TO NOD OFF OR FALL ASLEEP WHILE SITTING AND READING: 2
HOW LIKELY ARE YOU TO NOD OFF OR FALL ASLEEP WHILE SITTING QUIETLY AFTER LUNCH WITHOUT ALCOHOL: 2
HOW LIKELY ARE YOU TO NOD OFF OR FALL ASLEEP WHEN YOU ARE A PASSENGER IN A CAR FOR AN HOUR WITHOUT A BREAK: 0
HOW LIKELY ARE YOU TO NOD OFF OR FALL ASLEEP WHILE SITTING INACTIVE IN A PUBLIC PLACE: 0
HOW LIKELY ARE YOU TO NOD OFF OR FALL ASLEEP WHILE LYING DOWN TO REST IN THE AFTERNOON WHEN CIRCUMSTANCES PERMIT: 3
HOW LIKELY ARE YOU TO NOD OFF OR FALL ASLEEP WHILE WATCHING TV: 2
ESS TOTAL SCORE: 9
HOW LIKELY ARE YOU TO NOD OFF OR FALL ASLEEP WHILE SITTING AND TALKING TO SOMEONE: 0
HOW LIKELY ARE YOU TO NOD OFF OR FALL ASLEEP IN A CAR, WHILE STOPPED FOR A FEW MINUTES IN TRAFFIC: 0

## 2022-03-24 LAB — STATUS: NORMAL

## 2022-03-24 PROCEDURE — 95810 POLYSOM 6/> YRS 4/> PARAM: CPT | Performed by: INTERNAL MEDICINE

## 2022-03-24 NOTE — PROGRESS NOTES
3/23/2022  sleep study  for Varsha Israel  1965 is complete. Results are pending physician review.     Electronically signed by Manuel Briggs on 3/23/2022 at 11:27 PM

## 2022-03-25 ENCOUNTER — OFFICE VISIT (OUTPATIENT)
Dept: INTERNAL MEDICINE CLINIC | Age: 57
End: 2022-03-25
Payer: COMMERCIAL

## 2022-03-25 VITALS
WEIGHT: 259.2 LBS | HEART RATE: 72 BPM | SYSTOLIC BLOOD PRESSURE: 128 MMHG | BODY MASS INDEX: 37.19 KG/M2 | DIASTOLIC BLOOD PRESSURE: 68 MMHG | OXYGEN SATURATION: 93 % | RESPIRATION RATE: 16 BRPM | TEMPERATURE: 96.6 F

## 2022-03-25 DIAGNOSIS — E78.2 MIXED HYPERLIPIDEMIA: ICD-10-CM

## 2022-03-25 DIAGNOSIS — I10 ESSENTIAL HYPERTENSION: ICD-10-CM

## 2022-03-25 DIAGNOSIS — J01.10 ACUTE NON-RECURRENT FRONTAL SINUSITIS: ICD-10-CM

## 2022-03-25 DIAGNOSIS — S98.131A: ICD-10-CM

## 2022-03-25 DIAGNOSIS — J44.9 COPD, SEVERE (HCC): Primary | ICD-10-CM

## 2022-03-25 DIAGNOSIS — R29.818 SUSPECTED SLEEP APNEA: ICD-10-CM

## 2022-03-25 DIAGNOSIS — I25.10 CORONARY ARTERY DISEASE INVOLVING NATIVE CORONARY ARTERY OF NATIVE HEART WITHOUT ANGINA PECTORIS: ICD-10-CM

## 2022-03-25 DIAGNOSIS — I71.40 ABDOMINAL AORTIC ANEURYSM (AAA) WITHOUT RUPTURE: ICD-10-CM

## 2022-03-25 PROBLEM — R04.0 EPISTAXIS: Status: RESOLVED | Noted: 2021-11-29 | Resolved: 2022-03-25

## 2022-03-25 PROBLEM — K59.01 SLOW TRANSIT CONSTIPATION: Status: RESOLVED | Noted: 2021-11-29 | Resolved: 2022-03-25

## 2022-03-25 PROBLEM — J44.1 COPD EXACERBATION (HCC): Status: RESOLVED | Noted: 2021-12-13 | Resolved: 2022-03-25

## 2022-03-25 LAB
Lab: NORMAL
QC PASS/FAIL: NORMAL
SARS-COV-2 RDRP RESP QL NAA+PROBE: NEGATIVE

## 2022-03-25 PROCEDURE — G8427 DOCREV CUR MEDS BY ELIG CLIN: HCPCS | Performed by: INTERNAL MEDICINE

## 2022-03-25 PROCEDURE — 3017F COLORECTAL CA SCREEN DOC REV: CPT | Performed by: INTERNAL MEDICINE

## 2022-03-25 PROCEDURE — 1036F TOBACCO NON-USER: CPT | Performed by: INTERNAL MEDICINE

## 2022-03-25 PROCEDURE — 3023F SPIROM DOC REV: CPT | Performed by: INTERNAL MEDICINE

## 2022-03-25 PROCEDURE — 87635 SARS-COV-2 COVID-19 AMP PRB: CPT | Performed by: INTERNAL MEDICINE

## 2022-03-25 PROCEDURE — 99214 OFFICE O/P EST MOD 30 MIN: CPT | Performed by: INTERNAL MEDICINE

## 2022-03-25 PROCEDURE — G8417 CALC BMI ABV UP PARAM F/U: HCPCS | Performed by: INTERNAL MEDICINE

## 2022-03-25 PROCEDURE — G8482 FLU IMMUNIZE ORDER/ADMIN: HCPCS | Performed by: INTERNAL MEDICINE

## 2022-03-25 RX ORDER — AZITHROMYCIN 250 MG/1
TABLET, FILM COATED ORAL
Qty: 1 PACKET | Refills: 0 | Status: SHIPPED | OUTPATIENT
Start: 2022-03-25 | End: 2022-07-01 | Stop reason: ALTCHOICE

## 2022-03-25 RX ORDER — FLUTICASONE FUROATE, UMECLIDINIUM BROMIDE AND VILANTEROL TRIFENATATE 100; 62.5; 25 UG/1; UG/1; UG/1
1 POWDER RESPIRATORY (INHALATION) DAILY
Qty: 1 EACH | Refills: 5 | Status: SHIPPED | OUTPATIENT
Start: 2022-03-25

## 2022-03-25 NOTE — PROGRESS NOTES
Edis Pereyra  Patient's  is 1965  Seen in office on 3/25/2022      SUBJECTIVE:  Amie stark 60 y. o.year old male presents today   Chief Complaint   Patient presents with    Follow-up     sleep study completed    Nasal Congestion     x 1 week    Pharyngitis     mornings only    Chest Congestion     cough     Other     taking dayquil otc    Medication Refill     Is here for the routine appointment of COPD, coronary artery disease and suspected sleep apnea and hyperlipidemia  He does complain of sore throat nasal congestion, some cough that started about a week ago and he still has some symptoms. He has a history of COPD and exacerbation of COPD in the past.  He was using oxygen at home but for the last few weeks he has stopped using any oxygen and is feeling good. He has quit smoking completely  He has seen pulmonologist Dr Maynor Estrada and have sleep studies done. Results are pending  He denies any angina. No fever or chills  No abdominal pain. No nausea, vomiting or diarrhea    Taking medications regularly. No side effects noted. Review of Systems  Review of system normal except as in HPI  OBJECTIVE: /68   Pulse 72   Temp 96.6 °F (35.9 °C) (Temporal)   Resp 16   Wt 259 lb 3.2 oz (117.6 kg)   SpO2 93%   BMI 37.19 kg/m²     Wt Readings from Last 3 Encounters:   22 259 lb 3.2 oz (117.6 kg)   22 269 lb (122 kg)   22 269 lb (122 kg)        Patient was seen taking COVID-19 precautions. Face mask, gloves were used. Patient also wore facemask. GENERAL:  Alert, oriented, pleasant, in no apparent distress. HEENT:  Conjunctiva pink, no scleral icterus. ENT clear. NECK: Supple. No jugular venous distention noted. No masses felt,  CARDIOVASCULAR:  Normal S1 and S2    PULMONARY:  No respiratory distress. Patient has few scattered rhonchi  ABDOMEN:  Soft and non-tender,no masses  ororganomegaly.   EXTREMITIES:  No cyanosis, clubbing, or significant edema.  SKIN: Skin is warm and dry. NEUROLOGICAL:  Cranial nerves II through XII are grossly intact. IMPRESSION:    Encounter Diagnoses   Name Primary?  COPD, severe ([address] Yes    Essential hypertension     Mixed hyperlipidemia     Coronary artery disease s/p MI, CABG x 4     Abdominal aortic aneurysm (AAA) without rupture (HCC)     Suspected sleep apnea     Amputation, toe, traumatic with complication, right, initial encounter ([facility]     Acute non-recurrent frontal sinusitis        ASSESSMENT/PLAN:    Acute sinusitis/mild bronchitis  We will give Zithromax  Continue inhalers  If symptoms get worse call  We will check for COVID-19. It is negative    COPD, severe ([address]  PFTs done 6/2019 : severe obstructive disease. Patient is taking albuterol inhaler and incruse (could not afford it)  Trelegy ws ordered. Pt stopped it because of cost.  Using albuterol inhaler. Had exacerbation of COPD 12/21  And a trelegy.  Continue albuterol inhaler and oxygen  Refer patient to pulmonology: For COPD and sleep apnea : Has seen Dr Kelvin Robins     Suspected sleep apnea    Patient had cystoscopy and the anesthesiologist felt patient has sleep apnea.  Bang score was 5.  Discussed to get sleep study : wants to wait  Send for apnea link  Pt is suspected to have sleep apnea  Has seen Dr Kelvin Robins for sleep study. Sleep studies done     Abdominal aortic aneurysm (AAA) without rupture ([facility]    CT abdomen in 5/2019 : 3.3 cm : seen Dr Elvira Fallon. Recheck in one year   CT abdomen 2/2021 : 3.5-3.9 cm : recheck in 2 years      Bilateral carotid artery disease (HCC)    10/2016: Left ICA 50-69% and right ICA less than 50%   Dr Sharri Hardy following that. Recheck in 6/17 per pt. But pt states it was cancelled. Pt will call and set appt  Carotid doppler : 7/2018 :  bilateral 0-49 %     Coronary artery disease s/p MI, CABG x 4   Had MI in 10/16,Cath revealed 100% occluded RCA, 90% LAD and circumflex disease.   CABG x 4 on 10/24/16: Dr Eileen Leyva,  LIMA to LAd and D1  SVG to PDA  svg to ramus  On statin, ASA, BB(coreg), ACE (lisinopril)  Sees cardiologist Dr Bonita Boast abuse disorder    Pt. states he quit smoking since his admission to hospital. Congratulation . Patient still doing well without the smoking     Mixed hyperlipidemia   .  Patient has hyperlipidemia.  Continue atorvastatin.     Essential hypertension   .  Blood pressure in control.  Continue amlodipine and carvedilol    Return to office in 3  Orders Placed This Encounter   Procedures    POCT COVID-19 Rapid, NAAT         Mediations reviewed with the patient. Continue current medications. Appropriate prescriptions are addressed. After visit summeryprovided. Follow up as directed sooner if needed. Questions answered and patient verbalizes understanding. Call for any problems, questions, or concerns. Allergies   Allergen Reactions    Guaifenesin Er Diarrhea    Sulfa Antibiotics Rash     Current Outpatient Medications   Medication Sig Dispense Refill    Pseudoephedrine-APAP-DM (DAYQUIL MULTI-SYMPTOM PO) Take by mouth      tamsulosin (FLOMAX) 0.4 MG capsule Take 1 capsule by mouth daily 90 capsule 1    amLODIPine (NORVASC) 5 MG tablet Take 1 tablet by mouth daily TAKE 1 TABLET BY MOUTH ONCE A DAY (GENERIC NORVASC) 90 tablet 1    carvedilol (COREG) 6.25 MG tablet Take 1 tablet by mouth 2 times daily 60 tablet 5    fluticasone-umeclidin-vilant (TRELEGY ELLIPTA) 100-62.5-25 MCG/INH AEPB Inhale 1 puff into the lungs daily 1 each 3    albuterol sulfate  (90 Base) MCG/ACT inhaler INHALE 2 PUFFS INTO THE LUNGS EVERY 4 HOURS AS NEEDED FOR WHEEZING OR SHORTNESS OF BREATH 18 g 1    melatonin 3 MG TABS tablet Take 1 tablet by mouth nightly as needed (sleep)  3    aspirin 81 MG EC tablet Take 1 tablet by mouth daily 30 tablet 5     No current facility-administered medications for this visit.      Past Medical History:   Diagnosis Date    Abdominal aortic aneurysm (AAA) without rupture ([facility] 4/18/2018    CT abdomen 4/2018 showed 3.2 cm AAA. It was normal on CT abdomen in 10/2016 4/8/2019 US : 4.5 x 4.3 cm size : increased significantly. Vascular consultation and recheck in one year. CT abdomen in 5/2019 : 3.3 cm : seen Dr Kaykay No. Recheck in one year     Acute gout of left ankle 2/2/2017    Resolved. Uric acid improved after d/c HCTZ    Bilateral carotid artery disease ([address] 11/4/2016    10/2016: Left ICA 50-69% and right ICA less than 50%  Dr Tito Alvarenga following that. Recheck in 6/17 per pt. But pt states it was cancelled. Pt will call and set appt Carotid doppler : 7/2018 :  bilateral 0-49 %    COPD, severe ([address] 8/9/2019    PFTs done 6/2019 : severe obstructive disease.  Coronary artery disease involving native coronary artery of native heart without angina pectoris 11/04/2016    CABG x 4 on 9/24/16: Dr Bon Zelaya H/O colonoscopy 3-30-15    Dr. Allan Melendez, rtoin 5 yrs  (polypectomy)    H/O Doppler ultrasound 07/19/2018    Carotid Doppler. Mild (0-49%) disease of bilateral internal carotid arteries, bilateral common carotid artery bulb to proximal ICA exhibits calcific plaque, left distal ICA end diastolic velocity is elevated but ratio is below 2.0, normal vertebral flow.  Hyperlipidemia     Hypertension     Incidental lung nodule 4 mm RUL. f/u in 10/15 4/22/2015    Ct shoulder showed 4 mm nodule in RUL in 4/15,CT chest 9/15 5mm. f/u in 4/16    Kidney stone on right side 9/29/2016    Large stone on right side on CT abdomen of 10/16    Nephrolithiasis     Right shoulder pain 3/17/2015    Pt had seen Dr. Shad Cyr and had arthrogram and CT shoulder. Showed possible tear of biceps tendon.  Conservative treatment,     Squamous papilloma 2/23/15    benign left oropharynx/nasopharynx Dr Jackie Alanis abuse disorder 1/20/2015    Pt has quit on 10/20/16     Past Surgical History:   Procedure Laterality Date    CARDIAC SURGERY  10/24/2016     CABG x4 SVG to RCA and Ramus, LIMA to LAD, DIAG sequ    COLONOSCOPY  2015    Dr. Sabine Maldonado. 5yr    COLONOSCOPY      colonoscopy with polypectomy 2021 nathaly in 5 years.  HERNIA REPAIR      umbilical hernia    KIDNEY STONE SURGERY       Social History     Tobacco Use    Smoking status: Former Smoker     Packs/day: 1.00     Years: 1.00     Pack years: 1.00     Types: Cigarettes     Start date: 2017     Quit date: 2021     Years since quittin.2    Smokeless tobacco: Never Used   Substance Use Topics    Alcohol use:  Yes     Alcohol/week: 6.0 standard drinks     Types: 6 Standard drinks or equivalent per week     Comment: 3 or 4 beers per day, most days       LAB REVIEW:  CBC:   Lab Results   Component Value Date    WBC 16.3 2021    HGB 16.8 2021    HCT 53.6 2021     2021     Lipids:   Lab Results   Component Value Date    HDL 39 (L) 10/22/2021    LDLCALC 63 10/22/2021    LDLDIRECT 73 2018    TRIGLYCFAST 46 10/22/2021    CHOLFAST 111 10/22/2021     Renal:   Lab Results   Component Value Date    BUN 21 2021    CREATININE 0.8 2021     2021    K 4.6 2021    ALT 10 2021    AST 15 2021    GLUCOSE 156 2021    GLUF 117 2018     PT/INR:   Lab Results   Component Value Date    INR 1.07 10/24/2016     A1C:   Lab Results   Component Value Date    LABA1C 5.8 2020           Jeevan Jack MD, 3/25/2022 , 9:09 AM

## 2022-04-05 ENCOUNTER — OFFICE VISIT (OUTPATIENT)
Dept: PULMONOLOGY | Age: 57
End: 2022-04-05
Payer: COMMERCIAL

## 2022-04-05 VITALS
SYSTOLIC BLOOD PRESSURE: 110 MMHG | BODY MASS INDEX: 35.5 KG/M2 | HEART RATE: 81 BPM | WEIGHT: 248 LBS | DIASTOLIC BLOOD PRESSURE: 82 MMHG | HEIGHT: 70 IN | OXYGEN SATURATION: 94 %

## 2022-04-05 DIAGNOSIS — E66.9 OBESITY (BMI 30-39.9): ICD-10-CM

## 2022-04-05 DIAGNOSIS — G47.10 HYPERSOMNIA: ICD-10-CM

## 2022-04-05 DIAGNOSIS — G47.33 OSA (OBSTRUCTIVE SLEEP APNEA): ICD-10-CM

## 2022-04-05 DIAGNOSIS — F17.210 CIGARETTE SMOKER: ICD-10-CM

## 2022-04-05 DIAGNOSIS — J44.9 CHRONIC OBSTRUCTIVE PULMONARY DISEASE, UNSPECIFIED COPD TYPE (HCC): ICD-10-CM

## 2022-04-05 PROCEDURE — 99214 OFFICE O/P EST MOD 30 MIN: CPT | Performed by: INTERNAL MEDICINE

## 2022-04-05 PROCEDURE — G8417 CALC BMI ABV UP PARAM F/U: HCPCS | Performed by: INTERNAL MEDICINE

## 2022-04-05 PROCEDURE — 3023F SPIROM DOC REV: CPT | Performed by: INTERNAL MEDICINE

## 2022-04-05 PROCEDURE — G8427 DOCREV CUR MEDS BY ELIG CLIN: HCPCS | Performed by: INTERNAL MEDICINE

## 2022-04-05 ASSESSMENT — ENCOUNTER SYMPTOMS
COUGH: 0
SHORTNESS OF BREATH: 0
ABDOMINAL PAIN: 0
EYE ITCHING: 0
EYE DISCHARGE: 0
ABDOMINAL DISTENTION: 0
BACK PAIN: 0

## 2022-04-05 NOTE — ASSESSMENT & PLAN NOTE
He has Moderate COPD  Advised to quit smoking  C/w Inhalers  Get yearly flu vaccine  Low dose CT chest in [facility]  PFT in 1 year

## 2022-04-05 NOTE — PROGRESS NOTES
Mini Pat  1965  Referring Provider: Lydia Mercedes MD    Subjective:     Chief Complaint   Patient presents with    Follow-up       HPI  Dillon Cummins is a 60 y.o. male has come back as a follow up. He had a PSG done on 03/23/22 showed that he has severe JOSE MANUEL with an AHI of 41.5 and desat to 83%. He has no loss of weight. He is still sleepy and tired during the day time. He has a 50 pk yr smoking and he is still smoking 5 cigs per day. He has cough, little phlegm, no hemoptysis, no loss of weight, good appetite SOBOE some. He is on Trelegy. His PFT doen on 02/28/22 showed a FEV1 of 1.66 litres(46%) ratio is 63.5 and DLCO of 116. His 6 MWT showed good walking distance and no desaturation. Current Outpatient Medications   Medication Sig Dispense Refill    Pseudoephedrine-APAP-DM (DAYQUIL MULTI-SYMPTOM PO) Take by mouth      fluticasone-umeclidin-vilant (TRELEGY ELLIPTA) 100-62.5-25 MCG/INH AEPB Inhale 1 puff into the lungs daily 1 each 5    tamsulosin (FLOMAX) 0.4 MG capsule Take 1 capsule by mouth daily 90 capsule 1    amLODIPine (NORVASC) 5 MG tablet Take 1 tablet by mouth daily TAKE 1 TABLET BY MOUTH ONCE A DAY (GENERIC NORVASC) 90 tablet 1    carvedilol (COREG) 6.25 MG tablet Take 1 tablet by mouth 2 times daily 60 tablet 5    albuterol sulfate  (90 Base) MCG/ACT inhaler INHALE 2 PUFFS INTO THE LUNGS EVERY 4 HOURS AS NEEDED FOR WHEEZING OR SHORTNESS OF BREATH 18 g 1    melatonin 3 MG TABS tablet Take 1 tablet by mouth nightly as needed (sleep)  3    aspirin 81 MG EC tablet Take 1 tablet by mouth daily 30 tablet 5    azithromycin (ZITHROMAX) 250 MG tablet Take 2 tabs (500 mg) on Day 1, and take 1 tab (250 mg) on days 2 through 5. (Patient not taking: Reported on 4/5/2022) 1 packet 0     No current facility-administered medications for this visit.        Allergies   Allergen Reactions    Guaifenesin Er Diarrhea    Sulfa Antibiotics Rash       Past Medical History:   Diagnosis Date    Abdominal aortic aneurysm (AAA) without rupture ([address] 4/18/2018    CT abdomen 4/2018 showed 3.2 cm AAA. It was normal on CT abdomen in 10/2016 4/8/2019 US : 4.5 x 4.3 cm size : increased significantly. Vascular consultation and recheck in one year. CT abdomen in 5/2019 : 3.3 cm : seen Dr Qing Cabrera. Recheck in one year     Acute gout of left ankle 2/2/2017    Resolved. Uric acid improved after d/c HCTZ    Bilateral carotid artery disease ([address] 11/4/2016    10/2016: Left ICA 50-69% and right ICA less than 50%  Dr Tiffanie Beatty following that. Recheck in 6/17 per pt. But pt states it was cancelled. Pt will call and set appt Carotid doppler : 7/2018 :  bilateral 0-49 %    COPD, severe ([address] 8/9/2019    PFTs done 6/2019 : severe obstructive disease.  Coronary artery disease involving native coronary artery of native heart without angina pectoris 11/04/2016    CABG x 4 on 9/24/16: Dr Estiven Leger H/O colonoscopy 3-30-15    Dr. Mati Gill, rtoin 5 yrs  (polypectomy)    H/O Doppler ultrasound 07/19/2018    Carotid Doppler. Mild (0-49%) disease of bilateral internal carotid arteries, bilateral common carotid artery bulb to proximal ICA exhibits calcific plaque, left distal ICA end diastolic velocity is elevated but ratio is below 2.0, normal vertebral flow.  Hyperlipidemia     Hypertension     Incidental lung nodule 4 mm RUL. f/u in 10/15 4/22/2015    Ct shoulder showed 4 mm nodule in RUL in 4/15,CT chest 9/15 5mm. f/u in 4/16    Kidney stone on right side 9/29/2016    Large stone on right side on CT abdomen of 10/16    Nephrolithiasis     Right shoulder pain 3/17/2015    Pt had seen Dr. Pancho James and had arthrogram and CT shoulder. Showed possible tear of biceps tendon.  Conservative treatment,     Squamous papilloma 2/23/15    benign left oropharynx/nasopharynx Dr Ovidio Davis abuse disorder 1/20/2015    Pt has quit on 10/20/16       Past Surgical History:   Procedure Laterality Date    CARDIAC SURGERY 10/24/2016     CABG x4 SVG to RCA and Ramus, LIMA to LAD, DIAG sequ    COLONOSCOPY  2015    Dr. Guardado Needs. 5yr    COLONOSCOPY      colonoscopy with polypectomy 2021 nathaly in 5 years.  HERNIA REPAIR      umbilical hernia    KIDNEY STONE SURGERY         Social History     Socioeconomic History    Marital status:      Spouse name: None    Number of children: 1    Years of education: 12    Highest education level: None   Occupational History     Comment: Heavy equipment   Tobacco Use    Smoking status: Former Smoker     Packs/day: 1.00     Years: 1.00     Pack years: 1.00     Types: Cigarettes     Start date: 2017     Quit date: 2021     Years since quittin.3    Smokeless tobacco: Never Used   Substance and Sexual Activity    Alcohol use: Yes     Alcohol/week: 6.0 standard drinks     Types: 6 Standard drinks or equivalent per week     Comment: 3 or 4 beers per day, most days    Drug use: Yes     Types: Marijuana Curlie Opal)     Comment: 2 or 3 times per week    Sexual activity: Yes     Partners: Female   Other Topics Concern    None   Social History Narrative    None     Social Determinants of Health     Financial Resource Strain:     Difficulty of Paying Living Expenses: Not on file   Food Insecurity:     Worried About Running Out of Food in the Last Year: Not on file    Mauricio of Food in the Last Year: Not on file   Transportation Needs:     Lack of Transportation (Medical): Not on file    Lack of Transportation (Non-Medical):  Not on file   Physical Activity:     Days of Exercise per Week: Not on file    Minutes of Exercise per Session: Not on file   Stress:     Feeling of Stress : Not on file   Social Connections:     Frequency of Communication with Friends and Family: Not on file    Frequency of Social Gatherings with Friends and Family: Not on file    Attends Tenriism Services: Not on file    Active Member of Clubs or Organizations: Not on file    Attends Club or Organization Meetings: Not on file    Marital Status: Not on file   Intimate Partner Violence:     Fear of Current or Ex-Partner: Not on file    Emotionally Abused: Not on file    Physically Abused: Not on file    Sexually Abused: Not on file   Housing Stability:     Unable to Pay for Housing in the Last Year: Not on file    Number of [place] in the Last Year: Not on file    Unstable Housing in the Last Year: Not on file       Review of Systems   Constitutional: Positive for fatigue. HENT: Negative for congestion and postnasal drip. Eyes: Negative for discharge and itching. Respiratory: Negative for cough and shortness of breath. Cardiovascular: Negative for chest pain and leg swelling. Gastrointestinal: Negative for abdominal distention and abdominal pain. Endocrine: Negative for cold intolerance and heat intolerance. Genitourinary: Negative for enuresis and frequency. Musculoskeletal: Negative for arthralgias and back pain. Allergic/Immunologic: Negative for environmental allergies and food allergies. Neurological: Negative for light-headedness and headaches. Hematological: Negative for adenopathy. Psychiatric/Behavioral: Negative for agitation and behavioral problems. Objective:   /82   Pulse 81   Ht 5' 10\" (1.778 m)   Wt 248 lb (112.5 kg)   SpO2 94%   BMI 35.58 kg/m²   Body mass index is 35.58 kg/m². Sleep Medicine 3/23/2022 2/11/2022   Sitting and reading 2 3   Watching TV 2 3   Sitting, inactive in a public place (e.g. a theatre or a meeting) 0 3   As a passenger in a car for an hour without a break 0 3   Lying down to rest in the afternoon when circumstances permit 3 3   Sitting and talking to someone 0 1   Sitting quietly after a lunch without alcohol 2 3   In a car, while stopped for a few minutes in traffic 0 1   Total score 9 20   Neck circumference (Inches) - 19     Mallampati 4    Physical Exam  Vitals reviewed.    Constitutional:       Appearance: Normal appearance. Comments: Obesity   HENT:      Head: Normocephalic and atraumatic. Nose: Nose normal.      Mouth/Throat:      Mouth: Mucous membranes are moist.   Eyes:      Extraocular Movements: Extraocular movements intact. Pupils: Pupils are equal, round, and reactive to light. Cardiovascular:      Rate and Rhythm: Normal rate and regular rhythm. Pulses: Normal pulses. Heart sounds: Normal heart sounds. Pulmonary:      Effort: Pulmonary effort is normal.      Breath sounds: Normal breath sounds. Abdominal:      General: Abdomen is flat. Palpations: Abdomen is soft. Musculoskeletal:         General: Normal range of motion. Cervical back: Normal range of motion and neck supple. Skin:     General: Skin is warm and dry. Neurological:      General: No focal deficit present. Mental Status: He is alert and oriented to person, place, and time. Psychiatric:         Mood and Affect: Mood normal.         Behavior: Behavior normal.         Radiology: None    Assessment and Plan     Problem List        Pulmonary Problems    JOSE MANUEL (obstructive sleep apnea)      He has severe JOSE MANUEL  Advised to go for the CPAP/BIPAP titration study  Loose weight         Relevant Orders    Sleep Study with PAP Titration    COPD (chronic obstructive pulmonary disease) ([facility]      He has Moderate COPD  Advised to quit smoking  C/w Inhalers  Get yearly flu vaccine  Low dose CT chest in Dec'22  PFT in 1 year         Relevant Medications    albuterol sulfate  (90 Base) MCG/ACT inhaler    Pseudoephedrine-APAP-DM (DAYQUIL MULTI-SYMPTOM PO)    fluticasone-umeclidin-vilant (TRELEGY ELLIPTA) 100-62.5-25 MCG/INH AEPB    Other Relevant Orders    Full PFT Study With Bronchodilator       Other    Obesity (BMI 30-39. 9)      Advised to loose weight with diet and exercise           Hypersomnia      Advised to go for the CPAP titration study  Loose weight         Cigarette smoker      Advised to quit smoking  C.w Inhalers           Relevant Orders    Full PFT Study With Bronchodilator               Follow-Up:    Return in about 4 weeks (around 5/3/2022) for CPAP/BIPAP titration.      Progress notes sent to the referring Provider    Laine Luna MD MD  4/5/2022  11:31 AM

## 2022-04-18 ENCOUNTER — TELEPHONE (OUTPATIENT)
Dept: PULMONOLOGY | Age: 57
End: 2022-04-18

## 2022-04-18 NOTE — TELEPHONE ENCOUNTER
LVM regarding pts follow up. His sleep study is scheduled may 18th. He will need to follow up after.

## 2022-05-18 ENCOUNTER — HOSPITAL ENCOUNTER (OUTPATIENT)
Dept: SLEEP CENTER | Age: 57
Discharge: HOME | End: 2022-05-18
Payer: COMMERCIAL

## 2022-05-18 DIAGNOSIS — G47.33 OSA (OBSTRUCTIVE SLEEP APNEA): ICD-10-CM

## 2022-05-18 PROCEDURE — 95811 POLYSOM 6/>YRS CPAP 4/> PARM: CPT

## 2022-05-19 ENCOUNTER — TELEPHONE (OUTPATIENT)
Dept: PULMONOLOGY | Age: 57
End: 2022-05-19

## 2022-05-19 LAB — STATUS: NORMAL

## 2022-05-19 PROCEDURE — 95811 POLYSOM 6/>YRS CPAP 4/> PARM: CPT | Performed by: INTERNAL MEDICINE

## 2022-05-19 NOTE — PROGRESS NOTES
5/19/2022  sleep study  for Payal Larios  1965 is complete. Covid Rapid Test done in Lab - Negative Result   Results are pending physician review.     Electronically signed by Robson Grullon on 5/19/2022 at 4:02 AM

## 2022-07-01 ENCOUNTER — OFFICE VISIT (OUTPATIENT)
Dept: INTERNAL MEDICINE CLINIC | Age: 57
End: 2022-07-01
Payer: COMMERCIAL

## 2022-07-01 VITALS
HEART RATE: 76 BPM | SYSTOLIC BLOOD PRESSURE: 102 MMHG | OXYGEN SATURATION: 93 % | DIASTOLIC BLOOD PRESSURE: 60 MMHG | RESPIRATION RATE: 16 BRPM | BODY MASS INDEX: 36.39 KG/M2 | TEMPERATURE: 97 F | WEIGHT: 253.6 LBS

## 2022-07-01 DIAGNOSIS — G47.33 OSA (OBSTRUCTIVE SLEEP APNEA): ICD-10-CM

## 2022-07-01 DIAGNOSIS — S98.131A: ICD-10-CM

## 2022-07-01 DIAGNOSIS — I25.10 CORONARY ARTERY DISEASE INVOLVING NATIVE CORONARY ARTERY OF NATIVE HEART WITHOUT ANGINA PECTORIS: ICD-10-CM

## 2022-07-01 DIAGNOSIS — I77.9 BILATERAL CAROTID ARTERY DISEASE, UNSPECIFIED TYPE (HCC): ICD-10-CM

## 2022-07-01 DIAGNOSIS — I10 ESSENTIAL HYPERTENSION: Primary | ICD-10-CM

## 2022-07-01 DIAGNOSIS — R29.818 SUSPECTED SLEEP APNEA: ICD-10-CM

## 2022-07-01 DIAGNOSIS — I71.40 ABDOMINAL AORTIC ANEURYSM (AAA) WITHOUT RUPTURE: ICD-10-CM

## 2022-07-01 DIAGNOSIS — Z72.0 TOBACCO ABUSE DISORDER: ICD-10-CM

## 2022-07-01 DIAGNOSIS — J44.9 COPD, SEVERE (HCC): ICD-10-CM

## 2022-07-01 DIAGNOSIS — E78.2 MIXED HYPERLIPIDEMIA: ICD-10-CM

## 2022-07-01 DIAGNOSIS — Z12.5 SCREENING PSA (PROSTATE SPECIFIC ANTIGEN): ICD-10-CM

## 2022-07-01 LAB
BASOPHILS ABSOLUTE: 0.1 K/UL (ref 0–0.2)
BASOPHILS RELATIVE PERCENT: 1.1 %
EOSINOPHILS ABSOLUTE: 0.1 K/UL (ref 0–0.6)
EOSINOPHILS RELATIVE PERCENT: 0.5 %
HCT VFR BLD CALC: 53.3 % (ref 40.5–52.5)
HEMOGLOBIN: 18.1 G/DL (ref 13.5–17.5)
LYMPHOCYTES ABSOLUTE: 2 K/UL (ref 1–5.1)
LYMPHOCYTES RELATIVE PERCENT: 20.6 %
MCH RBC QN AUTO: 31.9 PG (ref 26–34)
MCHC RBC AUTO-ENTMCNC: 33.9 G/DL (ref 31–36)
MCV RBC AUTO: 93.9 FL (ref 80–100)
MONOCYTES ABSOLUTE: 0.7 K/UL (ref 0–1.3)
MONOCYTES RELATIVE PERCENT: 7.7 %
NEUTROPHILS ABSOLUTE: 6.7 K/UL (ref 1.7–7.7)
NEUTROPHILS RELATIVE PERCENT: 70.1 %
PDW BLD-RTO: 13.8 % (ref 12.4–15.4)
PLATELET # BLD: 253 K/UL (ref 135–450)
PMV BLD AUTO: 8 FL (ref 5–10.5)
RBC # BLD: 5.68 M/UL (ref 4.2–5.9)
WBC # BLD: 9.6 K/UL (ref 4–11)

## 2022-07-01 PROCEDURE — 99214 OFFICE O/P EST MOD 30 MIN: CPT | Performed by: INTERNAL MEDICINE

## 2022-07-01 PROCEDURE — 36415 COLL VENOUS BLD VENIPUNCTURE: CPT | Performed by: INTERNAL MEDICINE

## 2022-07-01 PROCEDURE — 1036F TOBACCO NON-USER: CPT | Performed by: INTERNAL MEDICINE

## 2022-07-01 PROCEDURE — 3017F COLORECTAL CA SCREEN DOC REV: CPT | Performed by: INTERNAL MEDICINE

## 2022-07-01 PROCEDURE — G8417 CALC BMI ABV UP PARAM F/U: HCPCS | Performed by: INTERNAL MEDICINE

## 2022-07-01 PROCEDURE — 3023F SPIROM DOC REV: CPT | Performed by: INTERNAL MEDICINE

## 2022-07-01 PROCEDURE — G8427 DOCREV CUR MEDS BY ELIG CLIN: HCPCS | Performed by: INTERNAL MEDICINE

## 2022-07-01 RX ORDER — ALBUTEROL SULFATE 90 UG/1
AEROSOL, METERED RESPIRATORY (INHALATION)
Qty: 2 EACH | Refills: 1 | Status: SHIPPED | OUTPATIENT
Start: 2022-07-01

## 2022-07-01 RX ORDER — AMLODIPINE BESYLATE 5 MG/1
5 TABLET ORAL DAILY
Qty: 90 TABLET | Refills: 1 | Status: SHIPPED | OUTPATIENT
Start: 2022-07-01

## 2022-07-01 RX ORDER — CARVEDILOL 6.25 MG/1
6.25 TABLET ORAL 2 TIMES DAILY
Qty: 60 TABLET | Refills: 5 | Status: SHIPPED | OUTPATIENT
Start: 2022-07-01

## 2022-07-01 RX ORDER — TAMSULOSIN HYDROCHLORIDE 0.4 MG/1
0.4 CAPSULE ORAL DAILY
Qty: 90 CAPSULE | Refills: 1 | Status: SHIPPED | OUTPATIENT
Start: 2022-07-01

## 2022-07-01 ASSESSMENT — ENCOUNTER SYMPTOMS
ALLERGIC/IMMUNOLOGIC NEGATIVE: 1
RESPIRATORY NEGATIVE: 1
EYES NEGATIVE: 1
GASTROINTESTINAL NEGATIVE: 1

## 2022-07-01 ASSESSMENT — PATIENT HEALTH QUESTIONNAIRE - PHQ9
SUM OF ALL RESPONSES TO PHQ QUESTIONS 1-9: 0
SUM OF ALL RESPONSES TO PHQ9 QUESTIONS 1 & 2: 0
2. FEELING DOWN, DEPRESSED OR HOPELESS: 0
SUM OF ALL RESPONSES TO PHQ QUESTIONS 1-9: 0
SUM OF ALL RESPONSES TO PHQ QUESTIONS 1-9: 0
1. LITTLE INTEREST OR PLEASURE IN DOING THINGS: 0
SUM OF ALL RESPONSES TO PHQ QUESTIONS 1-9: 0

## 2022-07-01 NOTE — PROGRESS NOTES
Nuris Gore  Patient's  is 1965  Seen in office on 2022      SUBJECTIVE:  Dino Pike mi 60 y. o.year old male presents today   Chief Complaint   Patient presents with    3 Month Follow-Up    Medication Refill     Pt is here for f/u ofCOPD, coronary artery disease,  sleep apnea and hyperlipidemia  He has a history of COPD and taking medication ( inhalers )  Pt was tested for sleep apnea and he does have it. On CPAP now. Pt is happy as he is sleeping well. He has seen pulmonologist Dr Rika Golden. CAD : no angina   Patient has hyperlipidemia. But he stopped taking cholesterol medication with a concern of dementia  Patient is started smoking again few cigarettes a day    No fever or chills  No abdominal pain. No nausea, vomiting or diarrhea  Taking medications regularly. No side effects noted. Review of Systems   Constitutional: Negative. HENT: Negative. Eyes: Negative. Respiratory: Negative. Cardiovascular: Negative. Gastrointestinal: Negative. Endocrine: Negative. Genitourinary: Negative. Musculoskeletal: Negative. Allergic/Immunologic: Negative. Neurological: Negative. Hematological: Negative. Psychiatric/Behavioral: Negative. OBJECTIVE: /60   Pulse 76   Temp 97 °F (36.1 °C) (Temporal)   Resp 16   Wt 253 lb 9.6 oz (115 kg)   SpO2 93%   BMI 36.39 kg/m²     Wt Readings from Last 3 Encounters:   22 253 lb 9.6 oz (115 kg)   22 248 lb (112.5 kg)   22 259 lb 3.2 oz (117.6 kg)      Patient was seen taking COVID-19 precautions. Face mask, gloves were used. Patient also wore facemask. GENERAL:  Alert, oriented, pleasant, in no apparent distress. HEENT:  Conjunctiva pink, no scleral icterus. ENT clear. NECK: Supple. No jugular venous distention noted. No masses felt,  CARDIOVASCULAR:  Normal S1 and S2    PULMONARY:  No respiratory distress. No wheezes or rales.     ABDOMEN:  Soft and non-tender,no masses ororganomegaly. EXTREMITIES:  No cyanosis, clubbing, or significant edema. Right foot examination shows amputated third fourth and fifth toes  SKIN: Skin is warm and dry. NEUROLOGICAL:  Cranial nerves II through XII are grossly intact. IMPRESSION:    Encounter Diagnoses   Name Primary?  Essential hypertension Yes    COPD, severe ([address]     Mixed hyperlipidemia     Tobacco abuse disorder     Coronary artery disease s/p MI, CABG x 4     Bilateral carotid artery disease, unspecified type ([facility]     Screening PSA (prostate specific antigen)     Suspected sleep apnea     JOSE MANUEL (obstructive sleep apnea)     Amputation, toe, traumatic with complication, right, initial encounter ([facility]     Abdominal aortic aneurysm (AAA) without rupture ([facility]        ASSESSMENT/PLAN:    Essential hypertension   .  Blood pressure in control.  Continue amlodipine and carvedilol    COPD, severe ([facility]  PFTs done 6/2019 : severe obstructive disease. Patient is taking albuterol inhaler and trilogy  Seeing pulmonologist. Has seen Dr Alycia Veloz     Suspected sleep apnea  Has seen Dr Alycia Veloz for sleep study. Sleep studies done. Patient has obstructive sleep apnea and using CPAP     Abdominal aortic aneurysm (AAA) without rupture ([facility]    CT abdomen in 5/2019 : 3.3 cm : seen Dr Mary Schaefer. Recheck in one year   CT abdomen 2/2021 : 3.5-3.9 cm : recheck in 2 years      Bilateral carotid artery disease (HCC)    10/2016: Left ICA 50-69% and right ICA less than 50%   Dr Gavi Mejía following that. Recheck in 6/17 per pt. But pt states it was cancelled. Pt will call and set appt  Carotid doppler : 7/2018 :  bilateral 0-49 %    Coronary artery disease s/p MI, CABG x 4   Had MI in 10/16,Cath revealed 100% occluded RCA, 90% LAD and circumflex disease.   CABG x 4 on 10/24/16: Dr Gavi Mejía,  LIMA to LAd and D1  SVG to PDA  svg to ramus  On statin, ASA, BB(coreg), ACE (lisinopril)  Sees cardiologist Dr Kiley burris disorder    Pt. states he quit smoking since his admission to hospital. Congratulation . Patient started smoking again few cigarettes a day. Advised patient to quit     Mixed hyperlipidemia   .  Patient has hyperlipidemia. Mira Austin had stopped taking atorvastatin for fear of dementia. Discussed with patient in detail the risk involved. He has coronary artery disease and carotid artery disease and the benefits of taking atorvastatin discussed. Patient will restart taking     Orders Placed This Encounter   Procedures    Lipid, Fasting    Comprehensive Metabolic Panel    CBC with Auto Differential    PSA Screening     Return to office in 3             Mediations reviewed with the patient. Continue current medications. Appropriate prescriptions are addressed. After visit summeryprovided. Follow up as directed sooner if needed. Questions answered and patient verbalizes understanding. Call for any problems, questions, or concerns. Allergies   Allergen Reactions    Guaifenesin Er Diarrhea    Sulfa Antibiotics Rash     Current Outpatient Medications   Medication Sig Dispense Refill    fluticasone-umeclidin-vilant (TRELEGY ELLIPTA) 100-62.5-25 MCG/INH AEPB Inhale 1 puff into the lungs daily 1 each 5    tamsulosin (FLOMAX) 0.4 MG capsule Take 1 capsule by mouth daily 90 capsule 1    amLODIPine (NORVASC) 5 MG tablet Take 1 tablet by mouth daily TAKE 1 TABLET BY MOUTH ONCE A DAY (GENERIC NORVASC) 90 tablet 1    carvedilol (COREG) 6.25 MG tablet Take 1 tablet by mouth 2 times daily 60 tablet 5    albuterol sulfate  (90 Base) MCG/ACT inhaler INHALE 2 PUFFS INTO THE LUNGS EVERY 4 HOURS AS NEEDED FOR WHEEZING OR SHORTNESS OF BREATH 18 g 1    melatonin 3 MG TABS tablet Take 1 tablet by mouth nightly as needed (sleep)  3    aspirin 81 MG EC tablet Take 1 tablet by mouth daily 30 tablet 5     No current facility-administered medications for this visit.      Past Medical History:   Diagnosis Date    Abdominal aortic aneurysm (AAA) without rupture ([address] 4/18/2018    CT abdomen 4/2018 showed 3.2 cm AAA. It was normal on CT abdomen in 10/2016 4/8/2019 US : 4.5 x 4.3 cm size : increased significantly. Vascular consultation and recheck in one year. CT abdomen in 5/2019 : 3.3 cm : seen Dr Michell Fortune. Recheck in one year     Acute gout of left ankle 2/2/2017    Resolved. Uric acid improved after d/c HCTZ    Bilateral carotid artery disease ([address] 11/4/2016    10/2016: Left ICA 50-69% and right ICA less than 50%  Dr Jaycee Pelletier following that. Recheck in 6/17 per pt. But pt states it was cancelled. Pt will call and set appt Carotid doppler : 7/2018 :  bilateral 0-49 %    COPD, severe ([address] 8/9/2019    PFTs done 6/2019 : severe obstructive disease.  Coronary artery disease involving native coronary artery of native heart without angina pectoris 11/04/2016    CABG x 4 on 9/24/16: Dr Kervin Novoa H/O colonoscopy 3-30-15    Dr. Gwyn Monet, rtoin 5 yrs  (polypectomy)    H/O Doppler ultrasound 07/19/2018    Carotid Doppler. Mild (0-49%) disease of bilateral internal carotid arteries, bilateral common carotid artery bulb to proximal ICA exhibits calcific plaque, left distal ICA end diastolic velocity is elevated but ratio is below 2.0, normal vertebral flow.  Hyperlipidemia     Hypertension     Incidental lung nodule 4 mm RUL. f/u in 10/15 4/22/2015    Ct shoulder showed 4 mm nodule in RUL in 4/15,CT chest 9/15 5mm. f/u in 4/16    Kidney stone on right side 9/29/2016    Large stone on right side on CT abdomen of 10/16    Nephrolithiasis     Right shoulder pain 3/17/2015    Pt had seen Dr. Mary Anne John and had arthrogram and CT shoulder. Showed possible tear of biceps tendon.  Conservative treatment,     Squamous papilloma 2/23/15    benign left oropharynx/nasopharynx Dr Israel Coffey abuse disorder 1/20/2015    Pt has quit on 10/20/16     Past Surgical History:   Procedure Laterality Date    CARDIAC SURGERY 10/24/2016     CABG x4 SVG to RCA and Ramus, LIMA to LAD, DIAG sequ    COLONOSCOPY  2015    Dr. Francois Fernandez. 5yr    COLONOSCOPY      colonoscopy with polypectomy 2021 nathaly in 5 years.  HERNIA REPAIR      umbilical hernia    KIDNEY STONE SURGERY       Social History     Tobacco Use    Smoking status: Former Smoker     Packs/day: 1.00     Years: 1.00     Pack years: 1.00     Types: Cigarettes     Start date: 2017     Quit date: 2021     Years since quittin.5    Smokeless tobacco: Never Used   Substance Use Topics    Alcohol use:  Yes     Alcohol/week: 6.0 standard drinks     Types: 6 Standard drinks or equivalent per week     Comment: 3 or 4 beers per day, most days       LAB REVIEW:  CBC:   Lab Results   Component Value Date/Time    WBC 16.3 2021 06:00 AM    HGB 16.8 2021 06:00 AM    HCT 53.6 2021 06:00 AM     2021 06:00 AM     Lipids:   Lab Results   Component Value Date    HDL 39 (L) 10/22/2021    LDLCALC 63 10/22/2021    LDLDIRECT 73 2018    TRIGLYCFAST 46 10/22/2021    CHOLFAST 111 10/22/2021     Renal:   Lab Results   Component Value Date/Time    BUN 21 2021 06:00 AM    CREATININE 0.8 2021 06:00 AM     2021 06:00 AM    K 4.6 2021 06:00 AM    ALT 10 2021 07:50 PM    AST 15 2021 07:50 PM    GLUCOSE 156 2021 06:00 AM    GLUF 117 2018 10:45 AM     PT/INR:   Lab Results   Component Value Date/Time    INR 1.07 10/24/2016 11:05 AM     A1C:   Lab Results   Component Value Date    LABA1C 5.8 2020           Mell Lubin MD, 2022 , 9:02 AM

## 2022-07-02 LAB
A/G RATIO: 2.3 (ref 1.1–2.2)
ALBUMIN SERPL-MCNC: 4.5 G/DL (ref 3.4–5)
ALP BLD-CCNC: 78 U/L (ref 40–129)
ALT SERPL-CCNC: 10 U/L (ref 10–40)
ANION GAP SERPL CALCULATED.3IONS-SCNC: 28 MMOL/L (ref 3–16)
AST SERPL-CCNC: 17 U/L (ref 15–37)
BILIRUB SERPL-MCNC: 0.5 MG/DL (ref 0–1)
BUN BLDV-MCNC: 10 MG/DL (ref 7–20)
CALCIUM SERPL-MCNC: 9.8 MG/DL (ref 8.3–10.6)
CHLORIDE BLD-SCNC: 95 MMOL/L (ref 99–110)
CHOLESTEROL, FASTING: 179 MG/DL (ref 0–199)
CO2: 21 MMOL/L (ref 21–32)
CREAT SERPL-MCNC: 0.9 MG/DL (ref 0.9–1.3)
GFR AFRICAN AMERICAN: >60
GFR NON-AFRICAN AMERICAN: >60
GLUCOSE BLD-MCNC: ABNORMAL MG/DL (ref 70–99)
HDLC SERPL-MCNC: 41 MG/DL (ref 40–60)
LDL CHOLESTEROL CALCULATED: 116 MG/DL
POTASSIUM SERPL-SCNC: 4.1 MMOL/L (ref 3.5–5.1)
PROSTATE SPECIFIC ANTIGEN: 0.53 NG/ML (ref 0–4)
SODIUM BLD-SCNC: 144 MMOL/L (ref 136–145)
TOTAL PROTEIN: 6.5 G/DL (ref 6.4–8.2)
TRIGLYCERIDE, FASTING: 110 MG/DL (ref 0–150)
VLDLC SERPL CALC-MCNC: 22 MG/DL

## 2022-07-07 ENCOUNTER — TELEPHONE (OUTPATIENT)
Dept: INTERNAL MEDICINE CLINIC | Age: 57
End: 2022-07-07

## 2022-07-07 DIAGNOSIS — R73.09 ELEVATED GLYCATED HEMOGLOBIN: Primary | ICD-10-CM

## 2022-07-07 DIAGNOSIS — R71.8 ELEVATED HEMATOCRIT: ICD-10-CM

## 2022-07-07 NOTE — TELEPHONE ENCOUNTER
Pauline Rangel MA   7/7/2022 11:50 AM EDT Back to Top        Spoke with patient informed him of results and he will go to hospital for H&H tomorrow. Cecy Bermudez

## 2022-07-18 ENCOUNTER — HOSPITAL ENCOUNTER (OUTPATIENT)
Age: 57
Discharge: HOME | End: 2022-07-18
Payer: COMMERCIAL

## 2022-07-18 LAB
HCT VFR BLD CALC: 55.5 % (ref 42–52)
HEMOGLOBIN: 18.7 GM/DL (ref 13.5–18)

## 2022-07-18 PROCEDURE — 85014 HEMATOCRIT: CPT

## 2022-07-18 PROCEDURE — 85018 HEMOGLOBIN: CPT

## 2022-07-18 PROCEDURE — 36415 COLL VENOUS BLD VENIPUNCTURE: CPT

## 2022-07-21 DIAGNOSIS — D75.1 POLYCYTHEMIA: ICD-10-CM

## 2022-07-21 NOTE — PROGRESS NOTES
Discussed with patient in detail. His H&H has been running high for some time. He has severe COPD, smoker and also sleep apnea.   Will refer patient to Dr. Aurelia Felton for further evaluation and treatment

## 2022-08-03 ENCOUNTER — INITIAL CONSULT (OUTPATIENT)
Dept: ONCOLOGY | Age: 57
End: 2022-08-03
Payer: COMMERCIAL

## 2022-08-03 ENCOUNTER — HOSPITAL ENCOUNTER (OUTPATIENT)
Dept: INFUSION THERAPY | Age: 57
Discharge: HOME | End: 2022-08-03
Payer: COMMERCIAL

## 2022-08-03 VITALS
TEMPERATURE: 97 F | RESPIRATION RATE: 16 BRPM | BODY MASS INDEX: 35.16 KG/M2 | DIASTOLIC BLOOD PRESSURE: 84 MMHG | WEIGHT: 245.6 LBS | OXYGEN SATURATION: 97 % | HEART RATE: 70 BPM | HEIGHT: 70 IN | SYSTOLIC BLOOD PRESSURE: 139 MMHG

## 2022-08-03 DIAGNOSIS — D75.1 POLYCYTHEMIA: ICD-10-CM

## 2022-08-03 DIAGNOSIS — D75.1 POLYCYTHEMIA: Primary | ICD-10-CM

## 2022-08-03 LAB
ALBUMIN SERPL-MCNC: 4.2 GM/DL (ref 3.4–5)
ALP BLD-CCNC: 81 IU/L (ref 40–129)
ALT SERPL-CCNC: 10 U/L (ref 10–40)
ANION GAP SERPL CALCULATED.3IONS-SCNC: 7 MMOL/L (ref 4–16)
AST SERPL-CCNC: 13 IU/L (ref 15–37)
BASOPHILS ABSOLUTE: 0 K/CU MM
BASOPHILS RELATIVE PERCENT: 0.3 % (ref 0–1)
BILIRUB SERPL-MCNC: 0.6 MG/DL (ref 0–1)
BUN BLDV-MCNC: 9 MG/DL (ref 6–23)
CALCIUM SERPL-MCNC: 9.9 MG/DL (ref 8.3–10.6)
CHLORIDE BLD-SCNC: 101 MMOL/L (ref 99–110)
CO2: 34 MMOL/L (ref 21–32)
CREAT SERPL-MCNC: 0.7 MG/DL (ref 0.9–1.3)
DIFFERENTIAL TYPE: ABNORMAL
EOSINOPHILS ABSOLUTE: 0.1 K/CU MM
EOSINOPHILS RELATIVE PERCENT: 1.5 % (ref 0–3)
ERYTHROCYTE SEDIMENTATION RATE: 1 MM/HR (ref 0–20)
GFR AFRICAN AMERICAN: >60 ML/MIN/1.73M2
GFR NON-AFRICAN AMERICAN: >60 ML/MIN/1.73M2
GLUCOSE BLD-MCNC: 104 MG/DL (ref 70–99)
HCT VFR BLD CALC: 55.2 % (ref 42–52)
HEMOGLOBIN: 18.2 GM/DL (ref 13.5–18)
LACTATE DEHYDROGENASE: 177 IU/L (ref 120–246)
LYMPHOCYTES ABSOLUTE: 2 K/CU MM
LYMPHOCYTES RELATIVE PERCENT: 23.1 % (ref 24–44)
MCH RBC QN AUTO: 31 PG (ref 27–31)
MCHC RBC AUTO-ENTMCNC: 33 % (ref 32–36)
MCV RBC AUTO: 94 FL (ref 78–100)
MONOCYTES ABSOLUTE: 1.1 K/CU MM
MONOCYTES RELATIVE PERCENT: 13.1 % (ref 0–4)
PDW BLD-RTO: 13.3 % (ref 11.7–14.9)
PLATELET # BLD: 205 K/CU MM (ref 140–440)
PMV BLD AUTO: 9.2 FL (ref 7.5–11.1)
POTASSIUM SERPL-SCNC: 4 MMOL/L (ref 3.5–5.1)
RBC # BLD: 5.87 M/CU MM (ref 4.6–6.2)
SEGMENTED NEUTROPHILS ABSOLUTE COUNT: 5.4 K/CU MM
SEGMENTED NEUTROPHILS RELATIVE PERCENT: 62 % (ref 36–66)
SODIUM BLD-SCNC: 142 MMOL/L (ref 135–145)
TOTAL PROTEIN: 6.4 GM/DL (ref 6.4–8.2)
WBC # BLD: 8.7 K/CU MM (ref 4–10.5)

## 2022-08-03 PROCEDURE — 85652 RBC SED RATE AUTOMATED: CPT

## 2022-08-03 PROCEDURE — G8427 DOCREV CUR MEDS BY ELIG CLIN: HCPCS | Performed by: INTERNAL MEDICINE

## 2022-08-03 PROCEDURE — 36415 COLL VENOUS BLD VENIPUNCTURE: CPT

## 2022-08-03 PROCEDURE — G8417 CALC BMI ABV UP PARAM F/U: HCPCS | Performed by: INTERNAL MEDICINE

## 2022-08-03 PROCEDURE — 83615 LACTATE (LD) (LDH) ENZYME: CPT

## 2022-08-03 PROCEDURE — 85025 COMPLETE CBC W/AUTO DIFF WBC: CPT

## 2022-08-03 PROCEDURE — 3017F COLORECTAL CA SCREEN DOC REV: CPT | Performed by: INTERNAL MEDICINE

## 2022-08-03 PROCEDURE — 82668 ASSAY OF ERYTHROPOIETIN: CPT

## 2022-08-03 PROCEDURE — 80053 COMPREHEN METABOLIC PANEL: CPT

## 2022-08-03 PROCEDURE — 1036F TOBACCO NON-USER: CPT | Performed by: INTERNAL MEDICINE

## 2022-08-03 PROCEDURE — 99204 OFFICE O/P NEW MOD 45 MIN: CPT | Performed by: INTERNAL MEDICINE

## 2022-08-03 ASSESSMENT — PATIENT HEALTH QUESTIONNAIRE - PHQ9
SUM OF ALL RESPONSES TO PHQ9 QUESTIONS 1 & 2: 0
6. FEELING BAD ABOUT YOURSELF - OR THAT YOU ARE A FAILURE OR HAVE LET YOURSELF OR YOUR FAMILY DOWN: 0
SUM OF ALL RESPONSES TO PHQ QUESTIONS 1-9: 0
4. FEELING TIRED OR HAVING LITTLE ENERGY: 0
9. THOUGHTS THAT YOU WOULD BE BETTER OFF DEAD, OR OF HURTING YOURSELF: 0
SUM OF ALL RESPONSES TO PHQ QUESTIONS 1-9: 0
1. LITTLE INTEREST OR PLEASURE IN DOING THINGS: 0
8. MOVING OR SPEAKING SO SLOWLY THAT OTHER PEOPLE COULD HAVE NOTICED. OR THE OPPOSITE, BEING SO FIGETY OR RESTLESS THAT YOU HAVE BEEN MOVING AROUND A LOT MORE THAN USUAL: 0
SUM OF ALL RESPONSES TO PHQ QUESTIONS 1-9: 0
7. TROUBLE CONCENTRATING ON THINGS, SUCH AS READING THE NEWSPAPER OR WATCHING TELEVISION: 0
10. IF YOU CHECKED OFF ANY PROBLEMS, HOW DIFFICULT HAVE THESE PROBLEMS MADE IT FOR YOU TO DO YOUR WORK, TAKE CARE OF THINGS AT HOME, OR GET ALONG WITH OTHER PEOPLE: 0
3. TROUBLE FALLING OR STAYING ASLEEP: 0
5. POOR APPETITE OR OVEREATING: 0
2. FEELING DOWN, DEPRESSED OR HOPELESS: 0
SUM OF ALL RESPONSES TO PHQ QUESTIONS 1-9: 0

## 2022-08-03 NOTE — PROGRESS NOTES
MA Rooming Questions  Patient: Anette Huerta  MRN: [id number]    Date: 8/3/2022        New patient        PHQ-9 Total Score: 0 (8/3/2022  2:34 PM)  Thoughts that you would be better off dead, or of hurting yourself in some way: Not at all (8/3/2022  2:34 PM)       PHQ-9 Given to (if applicable):               PHQ-9 Score (if applicable):                     [] Positive     []  Negative              Does question #9 need addressed (if applicable)                     [] Yes    []  No               Mayelin Strickland CMA

## 2022-08-03 NOTE — PROGRESS NOTES
Patient Name:  Bryan Duval  Patient :  1965  Patient MRN:  [id number]     Primary Oncologist: Holly Gomez MD  Referring Provider: Natasha Ontiveros MD     Date of Service: 8/3/2022      Reason for Consult: To evaluate the patient with erythrocytosis. Chief Complaint:    Chief Complaint   Patient presents with    New Patient     Patient Active Problem List:     Essential hypertension     Mixed hyperlipidemia     Incidental lung nodule 4 mm RUL. f/u in 10/15     Kidney stone on right side     Coronary artery disease s/p MI, CABG x 4     Bilateral carotid artery disease ([address]     Abdominal aortic aneurysm (AAA) without rupture (HCC)     COPD, severe (HCC)     Weight gain     JOSE MANUEL (obstructive sleep apnea)     Obesity (BMI 30-39. 9)     Hypersomnia     Cigarette smoker     Polycythemia    HPI:   Bryan Duval is a 69-year-old very pleasant gentleman with medical history significant for hypertension, hyperlipidemia, coronary artery disease, status post CABG, bilateral carotid artery disease, COPD, JOSE MANUEL, chronic cigarette smoker and obesity, referred to me on 8/3/2022 for evaluation of his erythrocytosis. He was noted to have erythrocytosis since 2021 and it has been persistent since then. He does not have leukocytosis or thrombocytosis. He does not have family history of erythrocytosis and he denies height altitude living or testosterone use. Since he is found to have a persistent erythrocytosis, he was referred to me for evaluation. He does have tiredness, fatigue and insomnia. Past Medical History:     Significant for  1. Hypertension  2. Hyperlipidemia  3. Coronary artery disease status post CABG  4.  Bilateral carotid artery disease  5. COPD  6. Obstructive sleep apnea  7. Chronic cigarette smoker  8. Kidney stones  9. Obesity    Past Surgery History:    Significant for  1. Coronary artery bypass grafting on 10/24/2016  2. Umbilical hernia repair  3.   Kidney stone surgery    Social History:   He is a current smoker and he smoked 1 pack a day since he was 19. He socially drinks alcohol and he denies illicit drug abuse. Family History:    Significant for colon cancer, hypertension, diabetes mellitus and coronary artery disease in his mother. His father has small cell lung cancer. Allergies   Allergen Reactions    Guaifenesin Er Diarrhea    Sulfa Antibiotics Rash       Current Outpatient Medications on File Prior to Visit   Medication Sig Dispense Refill    amLODIPine (NORVASC) 5 MG tablet Take 1 tablet by mouth daily TAKE 1 TABLET BY MOUTH ONCE A DAY (GENERIC NORVASC) 90 tablet 1    albuterol sulfate HFA (PROVENTIL;VENTOLIN;PROAIR) 108 (90 Base) MCG/ACT inhaler INHALE 2 PUFFS INTO THE LUNGS EVERY 4 HOURS AS NEEDED FOR WHEEZING OR SHORTNESS OF BREATH 2 each 1    carvedilol (COREG) 6.25 MG tablet Take 1 tablet by mouth 2 times daily 60 tablet 5    tamsulosin (FLOMAX) 0.4 MG capsule Take 1 capsule by mouth daily 90 capsule 1    fluticasone-umeclidin-vilant (TRELEGY ELLIPTA) 100-62.5-25 MCG/INH AEPB Inhale 1 puff into the lungs daily 1 each 5    melatonin 3 MG TABS tablet Take 1 tablet by mouth nightly as needed (sleep)  3    aspirin 81 MG EC tablet Take 1 tablet by mouth daily 30 tablet 5     No current facility-administered medications on file prior to visit. Review of Systems:  Constitutional:  No weight loss, No fever, No chills, No night sweats. Energy level is low. Eyes:  No diplopia, No transient or permanent loss of vision, No scotomata. ENT / Mouth:  No epistaxis, No dysphagia, No hoarseness, No oral ulcers, No gingival bleeding. No sore throat, No postnasal drip, No nasal drip, No mouth pain, No sinus pain, No tinnitus, Normal hearing. Cardiovascular:  No chest pain, No palpitations, No syncope, No upper extremity edema, No lower extremity edema, No calf discomfort. Respiratory:  No cough.   No hemoptysis, No pleurisy, No wheezing, Mild dyspnea +.  Gastrointestinal:  No abdominal pain, No abdominal cramping, No nausea, No vomiting, No constipation, No diarrhea, No hematochezia, No melena, No jaundice, No dyspepsia, No dysphagia. Urinary:  No dysuria, No hematuria, No urinary incontinence. Musculoskeletal:  No muscle pain, No swollen joints, No joint redness, No bone pain, No spine tenderness. Skin:  No rash, No nodules, No pruritus, No lesions. Neurologic:  No confusion, No seizures, No syncope, No tremor, No speech change, No headache, No hiccups, No abnormal gait, No sensory changes, No weakness. Psychiatric:  No depression, No anxiety, Concentration normal.  Endocrine:  No polyuria, No polydipsia, No hot flashes, No thyroid symptoms. Hematologic:  No epistaxis, No gingival bleeding, No petechiae, No ecchymosis. Lymphatic:  No lymphadenopathy, No lymphedema. Allergy / Immunologic:  No eczema, No frequent mucous infections, No frequent respiratory infections, No recurrent urticarial, No frequent skin infections.      Vital Signs: /84 (Site: Right Upper Arm, Position: Sitting, Cuff Size: Large Adult)   Pulse 70   Temp 97 °F (36.1 °C) (Infrared)   Resp 16   Ht 5' 10\" (1.778 m)   Wt 245 lb 9.6 oz (111.4 kg)   SpO2 97%   BMI 35.24 kg/m²      Physical Exam:  CONSTITUTIONAL: awake, alert, cooperative, no apparent distress   EYES: pupils equal, round and reactive to light, sclera clear, normal conjunctiva  ENT: Normocephalic, without obvious abnormality, atraumatic  NECK: supple, symmetrical, no jugular venous distension, no carotid bruits   HEMATOLOGIC/LYMPHATIC: no cervical, supraclavicular or axillary lymphadenopathy   LUNGS: VBS, no wheezes, no increased work of breathing, no rhonchi, clear to auscultation, no crackles,    CARDIOVASCULAR: regular rate and rhythm, normal S1 and S2, no murmur noted  ABDOMEN: normal bowel sounds x 4, soft, non-distended, non-tender, no masses palpated, no hepatosplenomegaly   MUSCULOSKELETAL: full range of motion noted, tone is normal  NEUROLOGIC: awake, alert, oriented to name, place and time. Motor skills grossly intact. SKIN: appears intact, normal skin color, normal texture, normal turgor, no jaundice.    EXTREMITIES: no LE edema, no leg swelling, no cyanosis, no clubbing,       Labs:  Hematology:  Lab Results   Component Value Date    WBC 8.7 08/03/2022    RBC 5.87 08/03/2022    HGB 18.2 (H) 08/03/2022    HCT 55.2 (H) 08/03/2022    MCV 94.0 08/03/2022    MCH 31.0 08/03/2022    MCHC 33.0 08/03/2022    RDW 13.3 08/03/2022     08/03/2022    MPV 9.2 08/03/2022    SEGSPCT 62.0 08/03/2022    EOSRELPCT 1.5 08/03/2022    BASOPCT 0.3 08/03/2022    LYMPHOPCT 23.1 (L) 08/03/2022    MONOPCT 13.1 (H) 08/03/2022    SEGSABS 5.4 08/03/2022    EOSABS 0.1 08/03/2022    BASOSABS 0.0 08/03/2022    LYMPHSABS 2.0 08/03/2022    MONOSABS 1.1 08/03/2022    DIFFTYPE AUTOMATED DIFFERENTIAL 08/03/2022     Lab Results   Component Value Date    ESR 1 08/03/2022     Chemistry:  Lab Results   Component Value Date     08/03/2022    K 4.0 08/03/2022     08/03/2022    CO2 34 (H) 08/03/2022    BUN 9 08/03/2022    CREATININE 0.7 (L) 08/03/2022    GLUCOSE 104 (H) 08/03/2022    CALCIUM 9.9 08/03/2022    PROT 6.4 08/03/2022    LABALBU 4.2 08/03/2022    BILITOT 0.6 08/03/2022    ALKPHOS 81 08/03/2022    AST 13 (L) 08/03/2022    ALT 10 08/03/2022    LABGLOM >60 08/03/2022    GFRAA >60 08/03/2022    AGRATIO 2.3 (H) 07/01/2022    GLOB 1.9 10/22/2021    MG 2.0 10/27/2016    POCCA 1.29 10/24/2016    POCGLU 117 (H) 10/26/2016     Lab Results   Component Value Date     08/03/2022     No components found for: LD  Lab Results   Component Value Date    TSHHS 1.351 04/18/2011     Immunology:  Lab Results   Component Value Date    PROT 6.4 08/03/2022     No results found for: Nic Guard, KLFLCR  No results found for: B2M  Coagulation Panel:  Lab Results   Component Value Date    PROTIME 12.2 10/24/2016    INR 1.07 10/24/2016 APTT 28.3 10/24/2016    DDIMER 312 (H) 01/03/2017     Anemia Panel:  No results found for: [id number], FOLATE  Tumor Markers:  Lab Results   Component Value Date    PSA 0.53 07/01/2022        Observations:  PHQ-9 Total Score: 0 (8/3/2022  2:34 PM)  Thoughts that you would be better off dead, or of hurting yourself in some way: Not at all (8/3/2022  2:34 PM)     Assessment   Erythrocytosis    Plan:  Jillian Funes is a 66-year-old very pleasant gentleman who was noted to have erythrocytosis since December 2021 and it has been persistent since then. He does not have leukocytosis or thrombocytosis. He does not have family history of erythrocytosis and he denies height altitude living or testosterone use. I believe his erythrocytosis is most likely due to chronic hypoxia due to underlying COPD, JOSE MANUEL and chronic cigarette smoking. However, I recommend proper work ups to rule out primary polycythemia vera. I will request CBC, erythropoietin, ESR, LDH, JAK2 V617F, JAK2 exon 12-13, MPL, CALR and BCR-ABL1. If he is found to have primary polycythemia vera, I will recommend therapeutic phlebotomy. I answered all his questions and concerns for today. I asked him to follow up with primary care physician on regular basis. I will continue to keep you updated on his progress. Thank you for allowing me to participate in the care of this very pleasant patient. Recent imaging and labs were reviewed and discussed with the patient.

## 2022-08-05 LAB — ERYTHROPOIETIN: 52 MU/ML (ref 4–27)

## 2022-08-14 NOTE — PROGRESS NOTES
Patient Name:  Krysten Hanna  Patient :  1965  Patient MRN:  [id number]     Primary Oncologist: Pablo Montanez MD  Referring Provider: Sarika Garcia MD     Date of Service: 8/15/2022      Reason for Consult: To evaluate the patient with erythrocytosis. Chief Complaint:    Chief Complaint   Patient presents with    Follow-up       Patient Active Problem List:     Essential hypertension     Mixed hyperlipidemia     Incidental lung nodule 4 mm RUL. f/u in 10/15     Kidney stone on right side     Coronary artery disease s/p MI, CABG x 4     Bilateral carotid artery disease ([address]     Abdominal aortic aneurysm (AAA) without rupture (HCC)     COPD, severe (HCC)     Weight gain     JOSE MANUEL (obstructive sleep apnea)     Obesity (BMI 30-39. 9)     Hypersomnia     Cigarette smoker     Polycythemia    HPI:   Krysten Hanna is a 49-year-old very pleasant gentleman with medical history significant for hypertension, hyperlipidemia, coronary artery disease, status post CABG, bilateral carotid artery disease, COPD, JOSE MANUEL, chronic cigarette smoker and obesity, referred to me on 8/3/2022 for evaluation of his erythrocytosis. He was noted to have erythrocytosis since 2021 and it has been persistent since then. He does not have leukocytosis or thrombocytosis. He does not have family history of erythrocytosis and he denies height altitude living or testosterone use. Since he is found to have a persistent erythrocytosis, he was referred to me for evaluation. He does have tiredness, fatigue and insomnia. Past Medical History:     Significant for  1. Hypertension  2. Hyperlipidemia  3. Coronary artery disease status post CABG  4.  Bilateral carotid artery disease  5. COPD  6. Obstructive sleep apnea  7. Chronic cigarette smoker  8. Kidney stones  9. Obesity    Past Surgery History:    Significant for  1. Coronary artery bypass grafting on 10/24/2016  2. Umbilical hernia repair  3.   Kidney stone +.  Gastrointestinal:  No abdominal pain, No abdominal cramping, No nausea, No vomiting, No constipation, No diarrhea, No hematochezia, No melena, No jaundice, No dyspepsia, No dysphagia. Urinary:  No dysuria, No hematuria, No urinary incontinence. Musculoskeletal:  No muscle pain, No swollen joints, No joint redness, No bone pain, No spine tenderness. Skin:  No rash, No nodules, No pruritus, No lesions. Neurologic:  No confusion, No seizures, No syncope, No tremor, No speech change, No headache, No hiccups, No abnormal gait, No sensory changes, No weakness. Psychiatric:  No depression, No anxiety, Concentration normal.  Endocrine:  No polyuria, No polydipsia, No hot flashes, No thyroid symptoms. Hematologic:  No epistaxis, No gingival bleeding, No petechiae, No ecchymosis. Lymphatic:  No lymphadenopathy, No lymphedema. Allergy / Immunologic:  No eczema, No frequent mucous infections, No frequent respiratory infections, No recurrent urticarial, No frequent skin infections.      Vital Signs: /74 (Site: Right Upper Arm, Position: Sitting, Cuff Size: Large Adult)   Pulse 73   Temp 97.6 °F (36.4 °C) (Temporal)   Resp 18   Ht 5' 10\" (1.778 m)   Wt 248 lb 3.2 oz (112.6 kg)   SpO2 91%   BMI 35.61 kg/m²      Physical Exam:  CONSTITUTIONAL: awake, alert, cooperative, no apparent distress   EYES: pupils equal, round and reactive to light, sclera clear, normal conjunctiva  ENT: Normocephalic, without obvious abnormality, atraumatic  NECK: supple, symmetrical, no jugular venous distension, no carotid bruits   HEMATOLOGIC/LYMPHATIC: no cervical, supraclavicular or axillary lymphadenopathy   LUNGS: VBS, no wheezes, no increased work of breathing, no rhonchi, clear to auscultation, no crackles,    CARDIOVASCULAR: regular rate and rhythm, normal S1 and S2, no murmur noted  ABDOMEN: normal bowel sounds x 4, soft, non-distended, non-tender, no masses palpated, no hepatosplenomegaly   MUSCULOSKELETAL: full range of APTT 28.3 10/24/2016    DDIMER 312 (H) 01/03/2017     Anemia Panel:  No results found for: [id number], FOLATE  Tumor Markers:  Lab Results   Component Value Date    PSA 0.53 07/01/2022        Observations:  No data recorded     Assessment   Erythrocytosis    Plan:  Kirill Agrawal is a 57-year-old very pleasant gentleman who was noted to have erythrocytosis since December 2021 and it has been persistent since then. He does not have leukocytosis or thrombocytosis. He does not have family history of erythrocytosis and he denies height altitude living or testosterone use. I believe his erythrocytosis is most likely due to chronic hypoxia due to underlying COPD, JOSE MANUEL and chronic cigarette smoking. However, I recommend proper work ups to rule out primary polycythemia vera. I will request CBC, erythropoietin, ESR, LDH, JAK2 V617F, JAK2 exon 12-13, MPL, CALR and BCR-ABL1. If he is found to have primary polycythemia vera, I will recommend therapeutic phlebotomy. I answered all his questions and concerns for today. I asked him to follow up with primary care physician on regular basis. I will continue to keep you updated on his progress. Thank you for allowing me to participate in the care of this very pleasant patient. Recent imaging and labs were reviewed and discussed with the patient.

## 2022-08-15 ENCOUNTER — OFFICE VISIT (OUTPATIENT)
Dept: ONCOLOGY | Age: 57
End: 2022-08-15
Payer: COMMERCIAL

## 2022-08-15 VITALS
DIASTOLIC BLOOD PRESSURE: 74 MMHG | WEIGHT: 248.2 LBS | HEART RATE: 73 BPM | BODY MASS INDEX: 35.53 KG/M2 | TEMPERATURE: 97.6 F | SYSTOLIC BLOOD PRESSURE: 126 MMHG | HEIGHT: 70 IN | OXYGEN SATURATION: 91 % | RESPIRATION RATE: 18 BRPM

## 2022-08-15 DIAGNOSIS — D75.1 POLYCYTHEMIA: Primary | ICD-10-CM

## 2022-08-15 PROCEDURE — 99213 OFFICE O/P EST LOW 20 MIN: CPT | Performed by: INTERNAL MEDICINE

## 2022-08-15 NOTE — PROGRESS NOTES
MA Rooming Questions  Patient: Greta Guevara  MRN: [id number]    Date: 8/15/2022        1. Do you have any new issues?   no         2. Do you need any refills on medications?    no    3. Have you had any imaging done since your last visit?   no    4. Have you been hospitalized or seen in the emergency room since your last visit here?   no    5. Did the patient have a depression screening completed today?  No    No data recorded     PHQ-9 Given to (if applicable):               PHQ-9 Score (if applicable):                     [] Positive     []  Negative              Does question #9 need addressed (if applicable)                     [] Yes    []  No               Jackie Zhang, ROB

## 2022-08-19 ENCOUNTER — OFFICE VISIT (OUTPATIENT)
Dept: ONCOLOGY | Age: 57
End: 2022-08-19
Payer: COMMERCIAL

## 2022-08-19 ENCOUNTER — CLINICAL DOCUMENTATION (OUTPATIENT)
Dept: ONCOLOGY | Age: 57
End: 2022-08-19

## 2022-08-19 DIAGNOSIS — D75.1 POLYCYTHEMIA: Primary | ICD-10-CM

## 2022-08-19 PROCEDURE — 99422 OL DIG E/M SVC 11-20 MIN: CPT | Performed by: INTERNAL MEDICINE

## 2022-08-19 ASSESSMENT — PATIENT HEALTH QUESTIONNAIRE - PHQ9
SUM OF ALL RESPONSES TO PHQ QUESTIONS 1-9: 0
2. FEELING DOWN, DEPRESSED OR HOPELESS: 0
1. LITTLE INTEREST OR PLEASURE IN DOING THINGS: 0
SUM OF ALL RESPONSES TO PHQ9 QUESTIONS 1 & 2: 0
SUM OF ALL RESPONSES TO PHQ QUESTIONS 1-9: 0

## 2022-08-19 NOTE — PROGRESS NOTES
MA Rooming Questions  Patient: Shirin Herring  MRN: [id number]    Date: 8/19/2022        1. Do you have any new issues?   no         2. Do you need any refills on medications?    no    3. Have you had any imaging done since your last visit? yes - labs     4. Have you been hospitalized or seen in the emergency room since your last visit here?   no    5. Did the patient have a depression screening completed today?  Yes    PHQ-9 Total Score: 0 (8/19/2022 12:55 PM)       PHQ-9 Given to (if applicable):               PHQ-9 Score (if applicable):                     [] Positive     []  Negative              Does question #9 need addressed (if applicable)                     [] Yes    []  No               Nneka Nieves CMA

## 2022-08-19 NOTE — PROGRESS NOTES
Patient Name:  Carmelo Stanton  Patient :  1965  Patient MRN:  [id number]     Primary Oncologist: Colin Souza MD  Referring Provider: Ketan Meza MD     Date of Service: 2022      Chief Complaint:    Chief Complaint   Patient presents with    Follow-up     Patient Active Problem List:     Essential hypertension     Mixed hyperlipidemia     Incidental lung nodule 4 mm RUL. f/u in 10/15     Kidney stone on right side     Coronary artery disease s/p MI, CABG x 4     Bilateral carotid artery disease ([address]     Abdominal aortic aneurysm (AAA) without rupture (HCC)     COPD, severe (HCC)     Weight gain     JOSE MANUEL (obstructive sleep apnea)     Obesity (BMI 30-39. 9)     Hypersomnia     Cigarette smoker     Polycythemia    HPI:   Carmelo Stanton is a 57-year-old very pleasant gentleman with medical history significant for hypertension, hyperlipidemia, coronary artery disease, status post CABG, bilateral carotid artery disease, COPD, JOSE MANUEL, chronic cigarette smoker and obesity, referred to me on 8/3/2022 for evaluation of his erythrocytosis. He was noted to have erythrocytosis since 2021 and it has been persistent since then. He does not have leukocytosis or thrombocytosis. He does not have family history of erythrocytosis and he denies height altitude living or testosterone use. Laboratory work-ups done on 8/3/2022 showed significant erythrocytosis [hemoglobin 18.2, hematocrit 55.2] and elevated erythropoietin level [52]. The rest of the blood tests, including ESR, LDH, JAK2 V617F, JAK2 exon 12-13, MPL, CALR and BCR-ABL1, well within normal range. On 2022, he presented to me for follow-up via televisit. He was referred to me for evaluation of erythrocytosis. I reviewed with him findings on labs done on 8/3/2022. All the work-ups for myeloproliferative neoplasms were within normal range.   His erythropoietin level is elevated and findings are most consistent with secondary erythrocytosis from chronic hypoxemia due to smoking, sleep apnea and underlying lung disease. Since he has significant polycythemia with signs and symptoms suggestive of hyperviscosity state, I recommend him to have 2 phlebotomies. I will reevaluate him again in 3 months. If his symptoms get better with phlebotomies, I will recommend to keep his hematocrit less than 50. He does have tiredness and fatigue. Besides that, he does not have any other significant symptoms at today visit. Past Medical History:     Significant for  1. Hypertension  2. Hyperlipidemia  3. Coronary artery disease status post CABG  4.  Bilateral carotid artery disease  5. COPD  6. Obstructive sleep apnea  7. Chronic cigarette smoker  8. Kidney stones  9. Obesity    Past Surgery History:    Significant for  1. Coronary artery bypass grafting on 10/24/2016  2. Umbilical hernia repair  3. Kidney stone surgery    Social History:   He is a current smoker and he smoked 1 pack a day since he was 19. He socially drinks alcohol and he denies illicit drug abuse. Family History:    Significant for colon cancer, hypertension, diabetes mellitus and coronary artery disease in his mother. His father has small cell lung cancer. Allergies   Allergen Reactions    Guaifenesin Er Diarrhea    Sulfa Antibiotics Rash     Review of Systems:  Constitutional:  No weight loss, No fever, No chills, No night sweats. Energy level is still low. Eyes:  No diplopia, No transient or permanent loss of vision, No scotomata. ENT / Mouth:  No epistaxis, No dysphagia, No hoarseness, No oral ulcers, No gingival bleeding. No sore throat, No postnasal drip, No nasal drip, No mouth pain, No sinus pain, No tinnitus, Normal hearing. Cardiovascular:  No chest pain, No palpitations, No syncope, No upper extremity edema, No lower extremity edema, No calf discomfort. Respiratory:  No cough.   No hemoptysis, No pleurisy, No wheezing, Mild dyspnea +.  Gastrointestinal:  No abdominal pain, No abdominal cramping, No nausea, No vomiting, No constipation, No diarrhea, No hematochezia, No melena, No jaundice, No dyspepsia, No dysphagia. Urinary:  No dysuria, No hematuria, No urinary incontinence. Musculoskeletal:  No muscle pain, No swollen joints, No joint redness, No bone pain, No spine tenderness. Skin:  No rash, No nodules, No pruritus, No lesions. Neurologic:  No confusion, No seizures, No syncope, No tremor, No speech change, No headache, No hiccups, No abnormal gait, No sensory changes, No weakness. Psychiatric:  No depression, No anxiety, Concentration normal.  Endocrine:  No polyuria, No polydipsia, No hot flashes, No thyroid symptoms. Hematologic:  No epistaxis, No gingival bleeding, No petechiae, No ecchymosis. Lymphatic:  No lymphadenopathy, No lymphedema. Allergy / Immunologic:  No eczema, No frequent mucous infections, No frequent respiratory infections, No recurrent urticarial, No frequent skin infections. Vital Signs: There were no vitals taken for this visit.      Physical Exam:     Labs:  Hematology:  Lab Results   Component Value Date    WBC 8.7 08/03/2022    RBC 5.87 08/03/2022    HGB 18.2 (H) 08/03/2022    HCT 55.2 (H) 08/03/2022    MCV 94.0 08/03/2022    MCH 31.0 08/03/2022    MCHC 33.0 08/03/2022    RDW 13.3 08/03/2022     08/03/2022    MPV 9.2 08/03/2022    SEGSPCT 62.0 08/03/2022    EOSRELPCT 1.5 08/03/2022    BASOPCT 0.3 08/03/2022    LYMPHOPCT 23.1 (L) 08/03/2022    MONOPCT 13.1 (H) 08/03/2022    SEGSABS 5.4 08/03/2022    EOSABS 0.1 08/03/2022    BASOSABS 0.0 08/03/2022    LYMPHSABS 2.0 08/03/2022    MONOSABS 1.1 08/03/2022    DIFFTYPE AUTOMATED DIFFERENTIAL 08/03/2022     Lab Results   Component Value Date    ESR 1 08/03/2022     Chemistry:  Lab Results   Component Value Date     08/03/2022    K 4.0 08/03/2022     08/03/2022    CO2 34 (H) 08/03/2022    BUN 9 08/03/2022    CREATININE 0.7 (L) 08/03/2022 GLUCOSE 104 (H) 08/03/2022    CALCIUM 9.9 08/03/2022    PROT 6.4 08/03/2022    LABALBU 4.2 08/03/2022    BILITOT 0.6 08/03/2022    ALKPHOS 81 08/03/2022    AST 13 (L) 08/03/2022    ALT 10 08/03/2022    LABGLOM >60 08/03/2022    GFRAA >60 08/03/2022    AGRATIO 2.3 (H) 07/01/2022    GLOB 1.9 10/22/2021    MG 2.0 10/27/2016    POCCA 1.29 10/24/2016    POCGLU 117 (H) 10/26/2016     Lab Results   Component Value Date     08/03/2022     No components found for: LD  Lab Results   Component Value Date    TSHHS 1.351 04/18/2011     Immunology:  Lab Results   Component Value Date    PROT 6.4 08/03/2022     [facility] results found for: [facility]  No results found for: B2M  Coagulation Panel:  Lab Results   Component Value Date    PROTIME 12.2 10/24/2016    INR 1.07 10/24/2016    APTT 28.3 10/24/2016    DDIMER 312 (H) 01/03/2017     Anemia Panel:  No results found for: Nina Factor  Tumor Markers:  Lab Results   Component Value Date    PSA 0.53 07/01/2022        Observations:  No data recorded     Assessment   Erythrocytosis    Plan:  Ameena Conrad is a 41-year-old very pleasant gentleman who was noted to have erythrocytosis since December 2021 and it has been persistent since then. He does not have leukocytosis or thrombocytosis. He does not have family history of erythrocytosis and he denies height altitude living or testosterone use. Laboratory work-ups done on 8/3/2022 showed significant erythrocytosis [hemoglobin 18.2, hematocrit 55.2] and elevated erythropoietin level [52]. The rest of the blood tests, including ESR, LDH, JAK2 V617F, JAK2 exon 12-13, MPL, CALR and BCR-ABL1, well within normal range. On August 19, 2022, he presented to me for follow-up via televisit. He was referred to me for evaluation of erythrocytosis. I reviewed with him findings on labs done on 8/3/2022. All the work-ups for myeloproliferative neoplasms were within normal range.   His erythropoietin level is elevated and findings are most consistent with secondary erythrocytosis from chronic hypoxemia due to smoking, sleep apnea and underlying lung disease. Since he has significant polycythemia with signs and symptoms suggestive of hyperviscosity state, I recommend him to have 2 phlebotomies. I will reevaluate him again in 3 months. If his symptoms get better with phlebotomies, I will recommend to keep his hematocrit less than 50. I answered all his questions and concerns for today. Recent imaging and labs were reviewed and discussed with the patient.    Start time: 1:02 pm   End time:1:14 pm  Total time spent with him - 12 minutes

## 2022-08-30 ENCOUNTER — HOSPITAL ENCOUNTER (OUTPATIENT)
Dept: INFUSION THERAPY | Age: 57
Setting detail: INFUSION SERIES
Discharge: HOME | End: 2022-08-30
Payer: COMMERCIAL

## 2022-08-30 VITALS
RESPIRATION RATE: 18 BRPM | DIASTOLIC BLOOD PRESSURE: 81 MMHG | OXYGEN SATURATION: 95 % | HEART RATE: 72 BPM | SYSTOLIC BLOOD PRESSURE: 106 MMHG

## 2022-08-30 DIAGNOSIS — D75.1 POLYCYTHEMIA: Primary | ICD-10-CM

## 2022-08-30 PROCEDURE — 99195 PHLEBOTOMY: CPT

## 2022-08-30 PROCEDURE — 99211 OFF/OP EST MAY X REQ PHY/QHP: CPT

## 2022-08-30 NOTE — PROGRESS NOTES
Diagnosis: POLYCYTHEMIA    Pre-Phlebotomy: BP /81   Pulse 71   Resp 18   SpO2 93%     Post-Phlebotomy: /74, HR 73    Volume Removed: 802 grams    Complications: None    Comments:  Tolerated Well        LOT# [id number]    Exp: 5-23      Farhad Murdock RN

## 2022-08-30 NOTE — PROGRESS NOTES
Tolerated phlebotomy well. Reviewed discharge instruction, voiced understanding. Copies of AVS given. Pt discharged home. Pt to exit via ambulation. Denies dizziness, lightheadedness, or any other symptoms. Next appt made for Sept. 13, 2023    No orders of the defined types were placed in this encounter.

## 2022-08-30 NOTE — PROGRESS NOTES
Pt taken to room 5, oriented to room, bed/chair controls, and call light. Needs met at present. Call light in reach. Pt aware of and agreeable for plan of care.

## 2022-08-30 NOTE — DISCHARGE INSTRUCTIONS
Discharge instructions:     *Do NOT skip meals today   *Drink PLENTY of liquids today, especially water   *Rest today   *Continue your medications as prescribed   *Return to your physician as planned    * If you feel a little lightheaded, lie down for a while, and have some snacks. * Take your time when standing up from a sitting or lying position     If you feel a little lightheaded, sit or lie back down     Have a snack or have something to drink    Call your doctor now or seek immediate medical care if:   *You are dizzy or lightheaded or feel like you may faint >1 day   * You have signs of infection, such as: Increased pain, swelling, warmth, or redness    Red streaks leading from the area    Pus draining from the area    A fever         Thank you for choosing [facility].  It is our pleasure to serve you    [facility]  Hours: 8:00-5:00  Phone: [phone]

## 2022-09-13 ENCOUNTER — HOSPITAL ENCOUNTER (OUTPATIENT)
Dept: INFUSION THERAPY | Age: 57
Setting detail: INFUSION SERIES
Discharge: HOME | End: 2022-09-13
Payer: COMMERCIAL

## 2022-09-13 VITALS
OXYGEN SATURATION: 91 % | TEMPERATURE: 97.3 F | DIASTOLIC BLOOD PRESSURE: 88 MMHG | HEART RATE: 74 BPM | SYSTOLIC BLOOD PRESSURE: 128 MMHG | RESPIRATION RATE: 16 BRPM

## 2022-09-13 DIAGNOSIS — D75.1 POLYCYTHEMIA: Primary | ICD-10-CM

## 2022-09-13 PROCEDURE — 99195 PHLEBOTOMY: CPT

## 2022-09-13 PROCEDURE — 99211 OFF/OP EST MAY X REQ PHY/QHP: CPT

## 2022-09-13 NOTE — PROGRESS NOTES
Diagnosis: Erythrocytosis    Pre-Phlebotomy: BP BP (!) 151/98   Pulse 73   Temp 97.3 °F (36.3 °C) (Infrared)   Resp 16   SpO2 91%   /, HR     Post-Phlebotomy: /82, HR 74    Volume Removed: 130 grams    Complications: none    Comments: tolerated well        Tabitha Zepeda RN

## 2022-09-13 NOTE — DISCHARGE INSTRUCTIONS
Discharge instructions:     *Do NOT skip meals today   *Drink PLENTY of liquids today, especially water   *Rest today   *Continue your medications as prescribed   *Return to your physician as planned    * If you feel a little lightheaded, lie down for a while,  and have some snacks. * Take your time when standing up from a sitting or  lying position     If you feel a little lightheaded, sit or lie back down     Have a snack or have something to drink    Call your doctor now or seek immediate medical care if:   *You are dizzy or lightheaded or feel like you may faint >1 day   * You have signs of infection, such as: Increased pain, swelling, warmth, or redness    Red streaks leading from the area    Pus draining from the area    A fever         Thank you for choosing [facility].  It is our pleasure to serve you    [facility]  Hours: 8:00-5:00  Phone: [phone]

## 2022-09-14 LAB
BCR/ABL T(9,22): NORMAL
CALR MUTATION: NORMAL
JAK2 EXONS 12-15 MUTATION: NORMAL
JAK2 GENE MUTATION QUAL: NORMAL
MPL 515 MUTATION: NORMAL

## 2022-09-27 ENCOUNTER — OFFICE VISIT (OUTPATIENT)
Dept: PULMONOLOGY | Age: 57
End: 2022-09-27
Payer: COMMERCIAL

## 2022-09-27 VITALS
SYSTOLIC BLOOD PRESSURE: 128 MMHG | HEART RATE: 87 BPM | OXYGEN SATURATION: 90 % | BODY MASS INDEX: 36.08 KG/M2 | WEIGHT: 252 LBS | DIASTOLIC BLOOD PRESSURE: 80 MMHG | HEIGHT: 70 IN

## 2022-09-27 DIAGNOSIS — G47.33 OSA (OBSTRUCTIVE SLEEP APNEA): ICD-10-CM

## 2022-09-27 DIAGNOSIS — E66.9 OBESITY (BMI 30-39.9): ICD-10-CM

## 2022-09-27 DIAGNOSIS — F17.210 CIGARETTE SMOKER: ICD-10-CM

## 2022-09-27 DIAGNOSIS — G47.10 HYPERSOMNIA: ICD-10-CM

## 2022-09-27 DIAGNOSIS — J44.9 COPD, SEVERE (HCC): ICD-10-CM

## 2022-09-27 PROCEDURE — G8427 DOCREV CUR MEDS BY ELIG CLIN: HCPCS | Performed by: INTERNAL MEDICINE

## 2022-09-27 PROCEDURE — G8417 CALC BMI ABV UP PARAM F/U: HCPCS | Performed by: INTERNAL MEDICINE

## 2022-09-27 PROCEDURE — 99214 OFFICE O/P EST MOD 30 MIN: CPT | Performed by: INTERNAL MEDICINE

## 2022-09-27 PROCEDURE — 3023F SPIROM DOC REV: CPT | Performed by: INTERNAL MEDICINE

## 2022-09-27 ASSESSMENT — ENCOUNTER SYMPTOMS
ABDOMINAL DISTENTION: 0
ABDOMINAL PAIN: 0
BACK PAIN: 0
SHORTNESS OF BREATH: 0
EYE ITCHING: 0
EYE DISCHARGE: 0
COUGH: 0

## 2022-09-27 NOTE — ASSESSMENT & PLAN NOTE
Advised to Quit smoking  C/w Inhalers  PFT in Feb'22  Low dose CT chest in [facility] [place] yearly flu vaccine

## 2022-09-27 NOTE — PROGRESS NOTES
Birgit Carter  1965  Referring Provider: Buck Billings MD    Subjective:     Chief Complaint   Patient presents with    Sleep Apnea     compliance       HPI  Thais Narayanan is a 59 y.o. male has come back as a follow up. He has severe JOSE MANUEL on a BIPAP which he is non compliant. He has no loss of weight. He has a FFM. He is still sleepy during the day time. He has a 50 pk yr smoking and still smoking 1 pk/day. He has no symptoms. He is on trelegy. He is not on oxygen. He had no flu vaccine but had COVID vaccine. Current Outpatient Medications   Medication Sig Dispense Refill    amLODIPine (NORVASC) 5 MG tablet Take 1 tablet by mouth daily TAKE 1 TABLET BY MOUTH ONCE A DAY (GENERIC NORVASC) 90 tablet 1    albuterol sulfate HFA (PROVENTIL;VENTOLIN;PROAIR) 108 (90 Base) MCG/ACT inhaler INHALE 2 PUFFS INTO THE LUNGS EVERY 4 HOURS AS NEEDED FOR WHEEZING OR SHORTNESS OF BREATH 2 each 1    carvedilol (COREG) 6.25 MG tablet Take 1 tablet by mouth 2 times daily 60 tablet 5    tamsulosin (FLOMAX) 0.4 MG capsule Take 1 capsule by mouth daily 90 capsule 1    fluticasone-umeclidin-vilant (TRELEGY ELLIPTA) 100-62.5-25 MCG/INH AEPB Inhale 1 puff into the lungs daily 1 each 5    melatonin 3 MG TABS tablet Take 1 tablet by mouth nightly as needed (sleep)  3    aspirin 81 MG EC tablet Take 1 tablet by mouth daily 30 tablet 5     No current facility-administered medications for this visit. Allergies   Allergen Reactions    Guaifenesin Er Diarrhea    Sulfa Antibiotics Rash       Past Medical History:   Diagnosis Date    Abdominal aortic aneurysm (AAA) without rupture ([facility] 4/18/2018    CT abdomen 4/2018 showed 3.2 cm AAA. It was normal on CT abdomen in 10/2016 4/8/2019 US : 4.5 x 4.3 cm size : increased significantly. Vascular consultation and recheck in one year. CT abdomen in 5/2019 : 3.3 cm : seen Dr Claudio Reyes. Recheck in one year     Acute gout of left ankle 2/2/2017    Resolved.  Uric acid improved after d/c HCTZ Bilateral carotid artery disease ([facility] 11/4/2016    10/2016: Left ICA 50-69% and right ICA less than 50%  Dr Pradeep Ulloa following that. Recheck in 6/17 per pt. But pt states it was cancelled. Pt will call and set appt Carotid doppler : 7/2018 :  bilateral 0-49 %    COPD, severe ([address] 8/9/2019    PFTs done 6/2019 : severe obstructive disease. Coronary artery disease involving native coronary artery of native heart without angina pectoris 11/04/2016    CABG x 4 on 9/24/16: Dr Pradeep Ulloa    H/O colonoscopy 3-30-15    Dr. Mirella Alexis, rtoin 5 yrs  (polypectomy)    H/O Doppler ultrasound 07/19/2018    Carotid Doppler. Mild (0-49%) disease of bilateral internal carotid arteries, bilateral common carotid artery bulb to proximal ICA exhibits calcific plaque, left distal ICA end diastolic velocity is elevated but ratio is below 2.0, normal vertebral flow. Hyperlipidemia     Hypertension     Incidental lung nodule 4 mm RUL. f/u in 10/15 4/22/2015    Ct shoulder showed 4 mm nodule in RUL in 4/15,CT chest 9/15 5mm. f/u in 4/16    Kidney stone on right side 9/29/2016    Large stone on right side on CT abdomen of 10/16    Nephrolithiasis     JOSE MANUEL (obstructive sleep apnea) 2/11/2022    Patient has obstructive sleep apnea and is on CPAP. Sees pulmonologist Dr Sal Michel    Right shoulder pain 3/17/2015    Pt had seen Dr. Yamel Lombardo and had arthrogram and CT shoulder. Showed possible tear of biceps tendon. Conservative treatment,     S/P split thickness skin graft 3/25/2021    Squamous papilloma 2/23/15    benign left oropharynx/nasopharynx Dr Sherren Caumoses abuse disorder 1/20/2015    Pt has quit on 10/20/16       Past Surgical History:   Procedure Laterality Date    CARDIAC SURGERY  10/24/2016     CABG x4 SVG to RCA and Ramus, LIMA to LAD, DIAG sequ    COLONOSCOPY  03/2015    Dr. Mirella Alexis. 5yr    COLONOSCOPY      colonoscopy with polypectomy 6-7-2021 nathaly in 5 years.     HERNIA REPAIR      umbilical hernia    KIDNEY STONE SURGERY Social History     Socioeconomic History    Marital status:      Spouse name: None    Number of children: 1    Years of education: 12    Highest education level: None   Occupational History     Comment: Heavy equipment   Tobacco Use    Smoking status: Former     Packs/day: 1.00     Years: 1.00     Pack years: 1.00     Types: Cigarettes     Start date: 2017     Quit date: 2021     Years since quittin.7    Smokeless tobacco: Never   Substance and Sexual Activity    Alcohol use: Yes     Alcohol/week: 6.0 standard drinks     Types: 6 Standard drinks or equivalent per week     Comment: 3 or 4 beers per day, most days    Drug use: Yes     Types: Marijuana Hulon Yin)     Comment: 2 or 3 times per week    Sexual activity: Yes     Partners: Female       Review of Systems   Constitutional:  Positive for fatigue. HENT:  Negative for congestion and postnasal drip. Eyes:  Negative for discharge and itching. Respiratory:  Negative for cough and shortness of breath. Cardiovascular:  Negative for chest pain and leg swelling. Gastrointestinal:  Negative for abdominal distention and abdominal pain. Endocrine: Negative for cold intolerance and heat intolerance. Genitourinary:  Negative for enuresis and frequency. Musculoskeletal:  Negative for arthralgias and back pain. Allergic/Immunologic: Negative for environmental allergies and food allergies. Neurological:  Negative for light-headedness and headaches. Hematological:  Negative for adenopathy. Psychiatric/Behavioral:  Negative for agitation and behavioral problems. Objective:   /80   Pulse 87   Ht 5' 10\" (1.778 m)   Wt 252 lb (114.3 kg)   SpO2 90%   BMI 36.16 kg/m²   Body mass index is 36.16 kg/m².   Sleep Medicine 3/23/2022 2022   Sitting and reading 2 3   Watching TV 2 3   Sitting, inactive in a public place (e.g. a theatre or a meeting) 0 3   As a passenger in a car for an hour without a break 0 3   Lying down to rest in the afternoon when circumstances permit 3 3   Sitting and talking to someone 0 1   Sitting quietly after a lunch without alcohol 2 3   In a car, while stopped for a few minutes in traffic 0 1   [place] Sleepiness Score 9 20   Neck circumference (Inches) - 19     Mallampati 3    Physical Exam  Vitals reviewed. Constitutional:       Appearance: Normal appearance. HENT:      Head: Normocephalic and atraumatic. Nose: Nose normal.      Mouth/Throat:      Mouth: Mucous membranes are moist.   Eyes:      Extraocular Movements: Extraocular movements intact. Pupils: Pupils are equal, round, and reactive to light. Cardiovascular:      Rate and Rhythm: Normal rate and regular rhythm. Pulses: Normal pulses. Heart sounds: Normal heart sounds. Pulmonary:      Effort: Pulmonary effort is normal.      Breath sounds: Normal breath sounds. Abdominal:      General: Abdomen is flat. Palpations: Abdomen is soft. Musculoskeletal:         General: Normal range of motion. Cervical back: Normal range of motion and neck supple. Skin:     General: Skin is warm and dry. Neurological:      General: No focal deficit present. Mental Status: He is alert and oriented to person, place, and time. Psychiatric:         Mood and Affect: Mood normal.         Behavior: Behavior normal.       Radiology: None    Assessment and Plan     Problem List          Respiratory    COPD, severe ([address]      Advised to Quit smoking  C/w Inhalers  PFT in Feb'22  Low dose CT chest in [address] [place] [address] yearly flu vaccine           Relevant Medications    fluticasone-umeclidin-vilant (TRELEGY ELLIPTA) 100-62.5-25 MCG/INH AEPB    albuterol sulfate HFA (PROVENTIL;VENTOLIN;PROAIR) 108 (90 Base) MCG/ACT inhaler    JOSE MANUEL (obstructive sleep apnea)      Advised to be compliant with the BIPAP  Loose weight  Need 2 week download data            Other    Obesity (BMI 30-39. 9)      Advised to loose weight with diet and exercise           Hypersomnia       Advised to be compliant with the BIPAP  Loose weight  Need 2 week download data           Cigarette smoker      Advised to quit smoking                 Follow-Up:    Return in about 15 weeks (around 1/10/2023) for Low dose CT chest, 2 week download data.      Progress notes sent to the referring Provider    Soraya Madsen MD MD  9/27/2022  12:08 PM

## 2022-11-17 ENCOUNTER — HOSPITAL ENCOUNTER (OUTPATIENT)
Dept: INFUSION THERAPY | Age: 57
Discharge: HOME | End: 2022-11-17
Payer: COMMERCIAL

## 2022-11-17 ENCOUNTER — OFFICE VISIT (OUTPATIENT)
Dept: ONCOLOGY | Age: 57
End: 2022-11-17
Payer: COMMERCIAL

## 2022-11-17 VITALS
RESPIRATION RATE: 18 BRPM | HEIGHT: 70 IN | WEIGHT: 249.6 LBS | TEMPERATURE: 97.5 F | DIASTOLIC BLOOD PRESSURE: 74 MMHG | OXYGEN SATURATION: 92 % | BODY MASS INDEX: 35.73 KG/M2 | SYSTOLIC BLOOD PRESSURE: 112 MMHG | HEART RATE: 71 BPM

## 2022-11-17 DIAGNOSIS — D75.1 POLYCYTHEMIA: Primary | ICD-10-CM

## 2022-11-17 DIAGNOSIS — D75.1 POLYCYTHEMIA: ICD-10-CM

## 2022-11-17 LAB
ALBUMIN SERPL-MCNC: 3.9 GM/DL (ref 3.4–5)
ALP BLD-CCNC: 60 IU/L (ref 40–129)
ALT SERPL-CCNC: 5 U/L (ref 10–40)
ANION GAP SERPL CALCULATED.3IONS-SCNC: 7 MMOL/L (ref 4–16)
AST SERPL-CCNC: 12 IU/L (ref 15–37)
BASOPHILS ABSOLUTE: 0 K/CU MM
BASOPHILS RELATIVE PERCENT: 0.4 % (ref 0–1)
BILIRUB SERPL-MCNC: 0.4 MG/DL (ref 0–1)
BUN BLDV-MCNC: 12 MG/DL (ref 6–23)
CALCIUM SERPL-MCNC: 9.3 MG/DL (ref 8.3–10.6)
CHLORIDE BLD-SCNC: 99 MMOL/L (ref 99–110)
CO2: 34 MMOL/L (ref 21–32)
CREAT SERPL-MCNC: 0.8 MG/DL (ref 0.9–1.3)
DIFFERENTIAL TYPE: ABNORMAL
EOSINOPHILS ABSOLUTE: 0.1 K/CU MM
EOSINOPHILS RELATIVE PERCENT: 1.5 % (ref 0–3)
ERYTHROCYTE SEDIMENTATION RATE: 6 MM/HR (ref 0–20)
GFR SERPL CREATININE-BSD FRML MDRD: >60 ML/MIN/1.73M2
GLUCOSE BLD-MCNC: 143 MG/DL (ref 70–99)
HCT VFR BLD CALC: 55.1 % (ref 42–52)
HEMOGLOBIN: 16.7 GM/DL (ref 13.5–18)
LACTATE DEHYDROGENASE: 226 IU/L (ref 120–246)
LYMPHOCYTES ABSOLUTE: 1.7 K/CU MM
LYMPHOCYTES RELATIVE PERCENT: 20.6 % (ref 24–44)
MCH RBC QN AUTO: 27.5 PG (ref 27–31)
MCHC RBC AUTO-ENTMCNC: 30.3 % (ref 32–36)
MCV RBC AUTO: 90.6 FL (ref 78–100)
MONOCYTES ABSOLUTE: 1 K/CU MM
MONOCYTES RELATIVE PERCENT: 12.4 % (ref 0–4)
PDW BLD-RTO: 14.6 % (ref 11.7–14.9)
PLATELET # BLD: 230 K/CU MM (ref 140–440)
PMV BLD AUTO: 9.3 FL (ref 7.5–11.1)
POTASSIUM SERPL-SCNC: 4.7 MMOL/L (ref 3.5–5.1)
RBC # BLD: 6.08 M/CU MM (ref 4.6–6.2)
SEGMENTED NEUTROPHILS ABSOLUTE COUNT: 5.5 K/CU MM
SEGMENTED NEUTROPHILS RELATIVE PERCENT: 65.1 % (ref 36–66)
SODIUM BLD-SCNC: 140 MMOL/L (ref 135–145)
TOTAL PROTEIN: 6 GM/DL (ref 6.4–8.2)
WBC # BLD: 8.4 K/CU MM (ref 4–10.5)

## 2022-11-17 PROCEDURE — 3017F COLORECTAL CA SCREEN DOC REV: CPT | Performed by: INTERNAL MEDICINE

## 2022-11-17 PROCEDURE — 1036F TOBACCO NON-USER: CPT | Performed by: INTERNAL MEDICINE

## 2022-11-17 PROCEDURE — G8484 FLU IMMUNIZE NO ADMIN: HCPCS | Performed by: INTERNAL MEDICINE

## 2022-11-17 PROCEDURE — 3078F DIAST BP <80 MM HG: CPT | Performed by: INTERNAL MEDICINE

## 2022-11-17 PROCEDURE — 99211 OFF/OP EST MAY X REQ PHY/QHP: CPT

## 2022-11-17 PROCEDURE — 85652 RBC SED RATE AUTOMATED: CPT

## 2022-11-17 PROCEDURE — G8417 CALC BMI ABV UP PARAM F/U: HCPCS | Performed by: INTERNAL MEDICINE

## 2022-11-17 PROCEDURE — G8427 DOCREV CUR MEDS BY ELIG CLIN: HCPCS | Performed by: INTERNAL MEDICINE

## 2022-11-17 PROCEDURE — 3074F SYST BP LT 130 MM HG: CPT | Performed by: INTERNAL MEDICINE

## 2022-11-17 PROCEDURE — 36415 COLL VENOUS BLD VENIPUNCTURE: CPT

## 2022-11-17 PROCEDURE — 99213 OFFICE O/P EST LOW 20 MIN: CPT | Performed by: INTERNAL MEDICINE

## 2022-11-17 PROCEDURE — 83615 LACTATE (LD) (LDH) ENZYME: CPT

## 2022-11-17 PROCEDURE — 85025 COMPLETE CBC W/AUTO DIFF WBC: CPT

## 2022-11-17 PROCEDURE — 80053 COMPREHEN METABOLIC PANEL: CPT

## 2022-11-17 ASSESSMENT — PATIENT HEALTH QUESTIONNAIRE - PHQ9
SUM OF ALL RESPONSES TO PHQ QUESTIONS 1-9: 0
1. LITTLE INTEREST OR PLEASURE IN DOING THINGS: 0
SUM OF ALL RESPONSES TO PHQ QUESTIONS 1-9: 0
SUM OF ALL RESPONSES TO PHQ9 QUESTIONS 1 & 2: 0
2. FEELING DOWN, DEPRESSED OR HOPELESS: 0

## 2022-11-17 NOTE — PROGRESS NOTES
MA Rooming Questions  Patient: Sandro Aquino  MRN: [id number]    Date: 11/17/2022        1. Do you have any new issues?   no         2. Do you need any refills on medications?    no    3. Have you had any imaging done since your last visit?   no    4. Have you been hospitalized or seen in the emergency room since your last visit here?   no    5. Did the patient have a depression screening completed today?  Yes    No data recorded     PHQ-9 Given to (if applicable):               PHQ-9 Score (if applicable):                     [] Positive     []  Negative              Does question #9 need addressed (if applicable)                     [] Yes    []  No               Veronica Melendez CMA

## 2022-11-17 NOTE — PROGRESS NOTES
Patient Name:  Kandice Mondragon  Patient :  1965  Patient MRN:  [id number]     Primary Oncologist: Nhan Scott MD  Referring Provider: Maurilio Quinn MD     Date of Service: 2022      Chief Complaint:    Chief Complaint   Patient presents with    Follow-up     Patient Active Problem List:     Essential hypertension     Mixed hyperlipidemia     Incidental lung nodule 4 mm RUL. f/u in 10/15     Kidney stone on right side     Coronary artery disease s/p MI, CABG x 4     Bilateral carotid artery disease ([address]     Abdominal aortic aneurysm (AAA) without rupture (HCC)     COPD, severe (HCC)     Weight gain     JOSE MANUEL (obstructive sleep apnea)     Obesity (BMI 30-39. 9)     Hypersomnia     Cigarette smoker     Polycythemia    HPI:   Kandice Mondragon is a 68-year-old very pleasant gentleman with medical history significant for hypertension, hyperlipidemia, coronary artery disease, status post CABG, bilateral carotid artery disease, COPD, JOSE MANUEL, chronic cigarette smoker and obesity, initially referred to me on 8/3/2022 for evaluation of his erythrocytosis. He was noted to have erythrocytosis since 2021 and it has been persistent since then. He does not have leukocytosis or thrombocytosis. He does not have family history of erythrocytosis and he denies height altitude living or testosterone use. Laboratory work-ups done on 8/3/2022 showed significant erythrocytosis [hemoglobin 18.2, hematocrit 55.2] and elevated erythropoietin level [52]. The rest of the blood tests, including ESR, LDH, JAK2 V617F, JAK2 exon 12-13, MPL, CALR and BCR-ABL1, well within normal range. On 2022, he presented to me for follow-up via televisit. He was referred to me for evaluation of erythrocytosis. I reviewed with him findings on labs done on 8/3/2022. All the work-ups for myeloproliferative neoplasms were within normal range.   His erythropoietin level is elevated and findings are most consistent with secondary erythrocytosis from chronic hypoxemia due to smoking, sleep apnea and underlying lung disease. Since he has significant polycythemia with signs and symptoms suggestive of hyperviscosity state, we started phlebotomies. He is s/p 2 phlebotomies and he is feeling better after phlebotomies. His Hematocrit today was 55.1 and it is still very high. I recommend him to have 2 more phlebotomies and I will re evaluate him again in 3 months. Will try to keep his hematocrit below 50. He does have tiredness and fatigue today. Besides that, he does not have any other significant symptoms at today visit. Past Medical History:     Significant for  1. Hypertension  2. Hyperlipidemia  3. Coronary artery disease status post CABG  4.  Bilateral carotid artery disease  5. COPD  6. Obstructive sleep apnea  7. Chronic cigarette smoker  8. Kidney stones  9. Obesity    Past Surgery History:    Significant for  1. Coronary artery bypass grafting on 10/24/2016  2. Umbilical hernia repair  3. Kidney stone surgery    Social History:   He is a current smoker and he smoked 1 pack a day since he was 19. He socially drinks alcohol and he denies illicit drug abuse. Family History:    Significant for colon cancer, hypertension, diabetes mellitus and coronary artery disease in his mother. His father has small cell lung cancer. Allergies   Allergen Reactions    Guaifenesin Er Diarrhea    Sulfa Antibiotics Rash     Review of Systems: \"Per interval history; otherwise 10 point ROS is negative. \"  His energy level is low and his sleep is fine. He doesn't have fever, chills, night sweats, cough, shortness of breath, chest pain, hemoptysis or palpitations. His bowel and bladder functions are normal. He denies nausea, vomiting, abdominal pain, diarrhea, constipation, dysuria, loss of appetite or weight loss. He denies neuropathy and he doesn't have bleeding or clotting issues. He denies any pain in his body.  No anxiety or depression. The rest of the systems are unremarkable. Vital Signs: /74 (Site: Right Upper Arm, Position: Sitting, Cuff Size: Large Adult)   Pulse 71   Temp 97.5 °F (36.4 °C) (Temporal)   Resp 18   Ht 5' 10\" (1.778 m)   Wt 249 lb 9.6 oz (113.2 kg)   SpO2 92%   BMI 35.81 kg/m²      Physical Exam:  CONSTITUTIONAL: awake, alert, cooperative, no apparent distress   EYES: pupils equal, round and reactive to light, sclera clear, normal conjunctiva  ENT: Normocephalic, without obvious abnormality, atraumatic  NECK: supple, symmetrical, no jugular venous distension, no carotid bruits   HEMATOLOGIC/LYMPHATIC: no cervical, supraclavicular or axillary lymphadenopathy   LUNGS: VBS, no wheezes, clear to auscultation, no crackles, no increased work of breathing, no rhonchi,   CARDIOVASCULAR: regular rate and rhythm, normal S1 and S2, no murmur noted  ABDOMEN: normal bowel sounds x 4, soft, non-distended, non-tender, no masses palpated, no hepatosplenomegaly   MUSCULOSKELETAL: full range of motion noted, tone is normal  NEUROLOGIC: awake, alert, oriented to name, place and time. Motor skills grossly intact. SKIN: appears intact, normal skin color, normal texture, normal turgor, no jaundice.    EXTREMITIES: no clubbing, no cyanosis, no leg swelling, no LE edema,     Labs:  Hematology:  Lab Results   Component Value Date    WBC 8.4 11/17/2022    RBC 6.08 11/17/2022    HGB 16.7 11/17/2022    HCT 55.1 (H) 11/17/2022    MCV 90.6 11/17/2022    MCH 27.5 11/17/2022    MCHC 30.3 (L) 11/17/2022    RDW 14.6 11/17/2022     11/17/2022    MPV 9.3 11/17/2022    SEGSPCT 65.1 11/17/2022    EOSRELPCT 1.5 11/17/2022    BASOPCT 0.4 11/17/2022    LYMPHOPCT 20.6 (L) 11/17/2022    MONOPCT 12.4 (H) 11/17/2022    SEGSABS 5.5 11/17/2022    EOSABS 0.1 11/17/2022    BASOSABS 0.0 11/17/2022    LYMPHSABS 1.7 11/17/2022    MONOSABS 1.0 11/17/2022    DIFFTYPE AUTOMATED DIFFERENTIAL 11/17/2022     Lab Results   Component Value Date    ESR 1 08/03/2022     Chemistry:  Lab Results   Component Value Date     08/03/2022    K 4.0 08/03/2022     08/03/2022    CO2 34 (H) 08/03/2022    BUN 9 08/03/2022    CREATININE 0.7 (L) 08/03/2022    GLUCOSE 104 (H) 08/03/2022    CALCIUM 9.9 08/03/2022    PROT 6.4 08/03/2022    LABALBU 4.2 08/03/2022    BILITOT 0.6 08/03/2022    ALKPHOS 81 08/03/2022    AST 13 (L) 08/03/2022    ALT 10 08/03/2022    LABGLOM >60 08/03/2022    GFRAA >60 08/03/2022    AGRATIO 2.3 (H) 07/01/2022    GLOB 1.9 10/22/2021    MG 2.0 10/27/2016    POCCA 1.29 10/24/2016    POCGLU 117 (H) 10/26/2016     Lab Results   Component Value Date     08/03/2022     No components found for: LD  Lab Results   Component Value Date    TSHHS 1.351 04/18/2011     Immunology:  Lab Results   Component Value Date    PROT 6.4 08/03/2022     No results found for: LIZBETH MarsLCR  No results found for: B2M  Coagulation Panel:  Lab Results   Component Value Date    PROTIME 12.2 10/24/2016    INR 1.07 10/24/2016    APTT 28.3 10/24/2016    DDIMER 312 (H) 01/03/2017     Anemia Panel:  No results found for: [address]  Tumor Markers:  Lab Results   Component Value Date    PSA 0.53 07/01/2022        Observations:  PHQ-9 Total Score: 0 (11/17/2022 10:05 AM)     Assessment   Erythrocytosis    Plan:  Donna Douglas is a 70-year-old very pleasant gentleman who was noted to have erythrocytosis since December 2021 and it has been persistent since then. He does not have leukocytosis or thrombocytosis. He does not have family history of erythrocytosis and he denies height altitude living or testosterone use. Laboratory work-ups done on 8/3/2022 showed significant erythrocytosis [hemoglobin 18.2, hematocrit 55.2] and elevated erythropoietin level [52]. The rest of the blood tests, including ESR, LDH, JAK2 V617F, JAK2 exon 12-13, MPL, CALR and BCR-ABL1, well within normal range.     On November 17, 2022, he presented to me for follow-up via televisit. He was referred to me for evaluation of erythrocytosis. I reviewed with him findings on labs done on 8/3/2022. All the work-ups for myeloproliferative neoplasms were within normal range. His erythropoietin level is elevated and findings are most consistent with secondary erythrocytosis from chronic hypoxemia due to smoking, sleep apnea and underlying lung disease. Since he has significant polycythemia with signs and symptoms suggestive of hyperviscosity state, we started phlebotomies. He is s/p 2 phlebotomies and he is feeling better after phlebotomies. His Hematocrit today was 55.1 and it is still very high. I recommend him to have 2 more phlebotomies and I will re evaluate him again in 3 months. Will try to keep his hematocrit below 50. I answered all his questions and concerns for today. Recent imaging and labs were reviewed and discussed with the patient.

## 2022-12-27 ENCOUNTER — TELEPHONE (OUTPATIENT)
Dept: PULMONOLOGY | Age: 57
End: 2022-12-27

## 2022-12-28 ENCOUNTER — CLINICAL DOCUMENTATION (OUTPATIENT)
Dept: ONCOLOGY | Age: 57
End: 2022-12-28

## 2022-12-28 DIAGNOSIS — D75.1 POLYCYTHEMIA: Primary | ICD-10-CM

## 2023-01-04 ENCOUNTER — HOSPITAL ENCOUNTER (OUTPATIENT)
Dept: INFUSION THERAPY | Age: 58
Setting detail: INFUSION SERIES
Discharge: HOME | End: 2023-01-04

## 2023-01-04 VITALS
DIASTOLIC BLOOD PRESSURE: 73 MMHG | SYSTOLIC BLOOD PRESSURE: 109 MMHG | OXYGEN SATURATION: 92 % | RESPIRATION RATE: 16 BRPM | TEMPERATURE: 97.3 F | HEART RATE: 76 BPM

## 2023-01-04 DIAGNOSIS — D75.1 POLYCYTHEMIA: Primary | ICD-10-CM

## 2023-01-04 PROCEDURE — 99195 PHLEBOTOMY: CPT

## 2023-01-04 PROCEDURE — 99211 OFF/OP EST MAY X REQ PHY/QHP: CPT

## 2023-01-04 NOTE — PROGRESS NOTES
Diagnosis: Polycythemia    Pre-Phlebotomy: BP /86   Pulse 80   Temp 97.3 °F (36.3 °C) (Infrared)   Resp 14   SpO2 92%       Post-Phlebotomy: /75, HR 80    Volume Removed: 580 grams from left arm per L.  Robert Baum    Complications: none    Comments: tolerated well        [place] RN

## 2023-01-05 ENCOUNTER — OFFICE VISIT (OUTPATIENT)
Dept: INTERNAL MEDICINE CLINIC | Age: 58
End: 2023-01-05
Payer: COMMERCIAL

## 2023-01-05 VITALS
HEART RATE: 84 BPM | RESPIRATION RATE: 20 BRPM | WEIGHT: 253.4 LBS | SYSTOLIC BLOOD PRESSURE: 119 MMHG | TEMPERATURE: 98.4 F | DIASTOLIC BLOOD PRESSURE: 72 MMHG | OXYGEN SATURATION: 93 % | BODY MASS INDEX: 36.36 KG/M2

## 2023-01-05 DIAGNOSIS — J44.9 COPD, SEVERE (HCC): Primary | ICD-10-CM

## 2023-01-05 DIAGNOSIS — I25.10 CORONARY ARTERY DISEASE INVOLVING NATIVE CORONARY ARTERY OF NATIVE HEART WITHOUT ANGINA PECTORIS: ICD-10-CM

## 2023-01-05 DIAGNOSIS — E78.2 MIXED HYPERLIPIDEMIA: ICD-10-CM

## 2023-01-05 DIAGNOSIS — I10 ESSENTIAL HYPERTENSION: ICD-10-CM

## 2023-01-05 DIAGNOSIS — S98.131A: ICD-10-CM

## 2023-01-05 DIAGNOSIS — D75.1 POLYCYTHEMIA: ICD-10-CM

## 2023-01-05 DIAGNOSIS — I71.40 ABDOMINAL AORTIC ANEURYSM (AAA) WITHOUT RUPTURE, UNSPECIFIED PART: ICD-10-CM

## 2023-01-05 DIAGNOSIS — R91.1 INCIDENTAL LUNG NODULE: ICD-10-CM

## 2023-01-05 DIAGNOSIS — G47.33 OSA (OBSTRUCTIVE SLEEP APNEA): ICD-10-CM

## 2023-01-05 PROCEDURE — 99214 OFFICE O/P EST MOD 30 MIN: CPT | Performed by: INTERNAL MEDICINE

## 2023-01-05 PROCEDURE — 3074F SYST BP LT 130 MM HG: CPT | Performed by: INTERNAL MEDICINE

## 2023-01-05 PROCEDURE — 3078F DIAST BP <80 MM HG: CPT | Performed by: INTERNAL MEDICINE

## 2023-01-05 RX ORDER — TAMSULOSIN HYDROCHLORIDE 0.4 MG/1
0.4 CAPSULE ORAL DAILY
Qty: 90 CAPSULE | Refills: 1 | Status: SHIPPED | OUTPATIENT
Start: 2023-01-05

## 2023-01-05 RX ORDER — ATORVASTATIN CALCIUM 40 MG/1
40 TABLET, FILM COATED ORAL DAILY
COMMUNITY
End: 2023-01-05 | Stop reason: SDUPTHER

## 2023-01-05 RX ORDER — AMLODIPINE BESYLATE 5 MG/1
5 TABLET ORAL DAILY
Qty: 90 TABLET | Refills: 1 | Status: SHIPPED | OUTPATIENT
Start: 2023-01-05

## 2023-01-05 RX ORDER — CARVEDILOL 6.25 MG/1
6.25 TABLET ORAL 2 TIMES DAILY
Qty: 60 TABLET | Refills: 5 | Status: SHIPPED | OUTPATIENT
Start: 2023-01-05

## 2023-01-05 RX ORDER — FLUTICASONE FUROATE, UMECLIDINIUM BROMIDE AND VILANTEROL TRIFENATATE 100; 62.5; 25 UG/1; UG/1; UG/1
1 POWDER RESPIRATORY (INHALATION) DAILY
Qty: 1 EACH | Refills: 5 | Status: SHIPPED | OUTPATIENT
Start: 2023-01-05

## 2023-01-05 RX ORDER — FLUTICASONE FUROATE, UMECLIDINIUM BROMIDE AND VILANTEROL TRIFENATATE 100; 62.5; 25 UG/1; UG/1; UG/1
1 POWDER RESPIRATORY (INHALATION) DAILY
COMMUNITY
End: 2023-01-05 | Stop reason: SDUPTHER

## 2023-01-05 RX ORDER — ALBUTEROL SULFATE 90 UG/1
AEROSOL, METERED RESPIRATORY (INHALATION)
Qty: 2 EACH | Refills: 1 | Status: SHIPPED | OUTPATIENT
Start: 2023-01-05

## 2023-01-05 RX ORDER — ATORVASTATIN CALCIUM 40 MG/1
40 TABLET, FILM COATED ORAL DAILY
Qty: 90 TABLET | Refills: 1 | Status: SHIPPED | OUTPATIENT
Start: 2023-01-05

## 2023-01-05 ASSESSMENT — ENCOUNTER SYMPTOMS
GASTROINTESTINAL NEGATIVE: 1
RESPIRATORY NEGATIVE: 1
EYES NEGATIVE: 1
ALLERGIC/IMMUNOLOGIC NEGATIVE: 1

## 2023-01-05 ASSESSMENT — PATIENT HEALTH QUESTIONNAIRE - PHQ9
SUM OF ALL RESPONSES TO PHQ QUESTIONS 1-9: 0
SUM OF ALL RESPONSES TO PHQ QUESTIONS 1-9: 0
2. FEELING DOWN, DEPRESSED OR HOPELESS: 0
SUM OF ALL RESPONSES TO PHQ9 QUESTIONS 1 & 2: 0
SUM OF ALL RESPONSES TO PHQ QUESTIONS 1-9: 0
1. LITTLE INTEREST OR PLEASURE IN DOING THINGS: 0
SUM OF ALL RESPONSES TO PHQ QUESTIONS 1-9: 0

## 2023-01-05 NOTE — PROGRESS NOTES
Kieran Abarca  Patient's  is 1965  Seen in office on 2023      SUBJECTIVE:  Guy December mi 59 y. o.year old male presents today   Chief Complaint   Patient presents with    3 Month Follow-Up    Medication Refill    Other     Sees Dr Vijay Palmer         Patient is here for follow-up of COPD, hyperlipidemia, hypertension and erythrocytosis and BPH  Last seen in 2022 patient missed appointments  Patient had elevated hemoglobin and hematocrit and was referred to heme-onc. Dr. Vijay Palmer  Patient had a work-up done and he felt patient has erythrocytosis and recommended phlebotomies. Patient states he already had 1 phlebotomy done and the next one is in the low weeks. Patient has hypertension. Taking medications No headaches, no chest pain, no palpitations and no dizziness. Patient has hyperlipidemia. Taking medications. No abdominal pain, no nausea or vomiting. No myalgias. Patient has COPD and using the inhalers and is benefiting from it. Denies any cough or congestion. Patient has BPH and Flomax is helping. Has history of abdominal aortic aneurysm no abdominal pain. Taking medications regularly. No side effects noted. Review of Systems   Constitutional: Negative. HENT: Negative. Eyes: Negative. Respiratory: Negative. Cardiovascular: Negative. Gastrointestinal: Negative. Endocrine: Negative. Genitourinary: Negative. Musculoskeletal: Negative. Skin: Negative. Allergic/Immunologic: Negative. Neurological: Negative. Hematological: Negative. Psychiatric/Behavioral: Negative. OBJECTIVE: /72   Pulse 84   Temp 98.4 °F (36.9 °C) (Temporal)   Resp 20   Wt 253 lb 6.4 oz (114.9 kg)   SpO2 93%   BMI 36.36 kg/m²     Wt Readings from Last 3 Encounters:   23 253 lb 6.4 oz (114.9 kg)   22 249 lb 9.6 oz (113.2 kg)   22 252 lb (114.3 kg)      GENERAL: - Alert, oriented, pleasant, in no apparent distress.   Obese  HEENT: - Conjunctiva pink, no scleral icterus. ENT clear. NECK: -Supple. No jugular venous distention noted. No masses felt,  CARDIOVASCULAR: - Normal S1 and S2    PULMONARY: - No respiratory distress. No wheezes or rales. ABDOMEN: - Soft and non-tender,no masses  ororganomegaly. EXTREMITIES: - No cyanosis, clubbing, or significant edema. SKIN: Skin is warm and dry. NEUROLOGICAL: - Cranial nerves II through XII are grossly intact. IMPRESSION:    Encounter Diagnoses   Name Primary? Polycythemia Yes    COPD, severe ([facility]     Essential hypertension     Mixed hyperlipidemia     Incidental lung nodule 4 mm RUL. f/u in 10/15     Coronary artery disease s/p MI, CABG x 4     Abdominal aortic aneurysm (AAA) without rupture, unspecified part     Amputation, toe, traumatic with complication, right, initial encounter [facility]     JOSE MANUEL (obstructive sleep apnea)        ASSESSMENT/PLAN:    1. Polycythemia  Overview:  H&H are elevated. H&H is elevated for some time. Last H&H is 18.7 and 55.5. Patient is a smoker. Has COPD and sleep apnea. Dr. Monica Fry  Pt had phlebotomy x 2 ordered one is done and other pending : pt has erythrocytosis   Doing better   2. COPD, severe ([facility]  Overview:  PFTs done 6/2019 : severe obstructive disease. Patient is taking albuterol inhaler and Trelegy  He sees pulmonologist Dr Homa Rivera     Orders:  -     albuterol sulfate HFA (PROVENTIL;VENTOLIN;PROAIR) 108 (90 Base) MCG/ACT inhaler; INHALE 2 PUFFS INTO THE LUNGS EVERY 4 HOURS AS NEEDED FOR WHEEZING OR SHORTNESS OF BREATH, Disp-2 each, R-1Normal  3. Essential hypertension  Overview:  Takes amlodipine and Coreg  Hypertension in control. Follow low salt diet. Continue current treatment. Orders:  -     amLODIPine (NORVASC) 5 MG tablet; Take 1 tablet by mouth daily TAKE 1 TABLET BY MOUTH ONCE A DAY (Frantz Austin), Disp-90 tablet, R-1Normal  4. Mixed hyperlipidemia  Overview:  Pt is taking atorvastatin. Follow diet. Recheck lipid   5.  Incidental lung nodule 4 mm RUL. f/u in 10/15  Overview:  Ct shoulder showed 4 mm nodule in RUL in 4/15  CT scan of the chest showed in a 5 mm nodule stable  CT chest 9/16: 5 mm nodule stable . CT chest 9/17 : stable nodule for 2 years. . There for benign    6. Coronary artery disease s/p MI, CABG x 4  Overview: Had MI in 10/16,Cath revealed 100% occluded RCA, 90% LAD and circumflex disease. CABG x 4 on 10/24/16: Dr Ana De Santiago,  LAWSON to LAd and D1  SVG to PDA  svg to ramus  On statin, ASA, BB(coreg), ACE (lisinopril)  Sees cardiologist Dr Corky Elliott     7. Abdominal aortic aneurysm (AAA) without rupture, unspecified part  Overview:  CT abdomen 4/2018 showed 3.2 cm AAA. It was normal on CT abdomen in 10/2016  4/8/2019 US : 4.5 x 4.3 cm size : increased significantly. Vascular consultation and recheck in one year. CT abdomen in 5/2019 : 3.3 cm : seen Dr Jennetta Bloch. Recheck in one year   CT abdomen 2/2021 : 3.5-3.9 cm : recheck in 2 years   US abdominal aorta ordered   Assessment & Plan:   As above   Orders:  -     US ABDOMINAL AORTA LIMITED; Future  8. Amputation, toe, traumatic with complication, right, initial encounter [facility])  Overview:  2/25/2020 : Crush injury of right foot with amputation of the lateral 3 toes. It healed. 9. JOSE MANUEL (obstructive sleep apnea)  Overview:  Patient has obstructive sleep apnea and is on CPAP. Sees pulmonologist Dr Marce Owen This Encounter   Procedures    [address]    Lipid, Fasting     Return to office in 3 months. Mediations reviewed with the patient. Continue current medications. Appropriate prescriptions are addressed. After visit summeryprovided. Follow up as directed sooner if needed. Questions answered and patient verbalizes understanding. Call for any problems, questions, or concerns.        Allergies   Allergen Reactions    Guaifenesin Er Diarrhea    Sulfa Antibiotics Rash     Current Outpatient Medications   Medication Sig Dispense Refill fluticasone-umeclidin-vilant (TRELEGY ELLIPTA) 100-62.5-25 MCG/ACT AEPB inhaler Inhale 1 puff into the lungs daily      atorvastatin (LIPITOR) 40 MG tablet Take 40 mg by mouth daily      amLODIPine (NORVASC) 5 MG tablet Take 1 tablet by mouth daily TAKE 1 TABLET BY MOUTH ONCE A DAY (GENERIC NORVASC) 90 tablet 1    albuterol sulfate HFA (PROVENTIL;VENTOLIN;PROAIR) 108 (90 Base) MCG/ACT inhaler INHALE 2 PUFFS INTO THE LUNGS EVERY 4 HOURS AS NEEDED FOR WHEEZING OR SHORTNESS OF BREATH 2 each 1    carvedilol (COREG) 6.25 MG tablet Take 1 tablet by mouth 2 times daily 60 tablet 5    tamsulosin (FLOMAX) 0.4 MG capsule Take 1 capsule by mouth daily 90 capsule 1    melatonin 3 MG TABS tablet Take 1 tablet by mouth nightly as needed (sleep)  3    aspirin 81 MG EC tablet Take 1 tablet by mouth daily 30 tablet 5     No current facility-administered medications for this visit. Past Medical History:   Diagnosis Date    Abdominal aortic aneurysm (AAA) without rupture 04/18/2018    CT abdomen 4/2018 showed 3.2 cm AAA. It was normal on CT abdomen in 10/2016 4/8/2019 US : 4.5 x 4.3 cm size : increased significantly. Vascular consultation and recheck in one year. CT abdomen in 5/2019 : 3.3 cm : seen Dr Nida Silverman. Recheck in one year     Acute gout of left ankle 02/02/2017    Resolved. Uric acid improved after d/c HCTZ    Bilateral carotid artery disease ([facility] 11/04/2016    10/2016: Left ICA 50-69% and right ICA less than 50%  Dr Amarilis Toledo following that. Recheck in 6/17 per pt. But pt states it was cancelled. Pt will call and set appt Carotid doppler : 7/2018 :  bilateral 0-49 %    COPD, severe ([address] 08/09/2019    PFTs done 6/2019 : severe obstructive disease.      Coronary artery disease involving native coronary artery of native heart without angina pectoris 11/04/2016    CABG x 4 on 9/24/16: Dr Sallye Eisenmenger 09/2022    H/O colonoscopy 03/30/2015    Dr. Verlyn Check, rtoin 5 yrs  (polypectomy)    H/O Doppler ultrasound 2018    Carotid Doppler. Mild (0-49%) disease of bilateral internal carotid arteries, bilateral common carotid artery bulb to proximal ICA exhibits calcific plaque, left distal ICA end diastolic velocity is elevated but ratio is below 2.0, normal vertebral flow. Hyperlipidemia     Hypertension     Incidental lung nodule 4 mm RUL. f/u in 10/15 2015    Ct shoulder showed 4 mm nodule in RUL in 4/15,CT chest 9/15 5mm. f/u in     Kidney stone on right side 2016    Large stone on right side on CT abdomen of 10/16    Nephrolithiasis     JOSE MANUEL (obstructive sleep apnea) 2022    Patient has obstructive sleep apnea and is on CPAP. Sees pulmonologist Dr Jacquelyn Alvares    Right shoulder pain 2015    Pt had seen Dr. Elmira Vivar and had arthrogram and CT shoulder. Showed possible tear of biceps tendon. Conservative treatment,     S/P split thickness skin graft 2021    Squamous papilloma 2015    benign left oropharynx/nasopharynx Dr Gaines Has abuse disorder 2015    Pt has quit on 10/20/16     Past Surgical History:   Procedure Laterality Date    CARDIAC SURGERY  10/24/2016     CABG x4 SVG to RCA and Ramus, LIMA to LAD, DIAG sequ    COLONOSCOPY  2015    Dr. Asuncion Shine. 5yr    COLONOSCOPY      colonoscopy with polypectomy 2021 nathaly in 5 years. HERNIA REPAIR      umbilical hernia    KIDNEY STONE SURGERY       Social History     Tobacco Use    Smoking status: Former     Packs/day: 1.00     Years: 1.00     Pack years: 1.00     Types: Cigarettes     Start date: 2017     Quit date: 2021     Years since quittin.0    Smokeless tobacco: Never   Substance Use Topics    Alcohol use:  Yes     Alcohol/week: 6.0 standard drinks     Types: 6 Standard drinks or equivalent per week     Comment: 3 or 4 beers per day, most days       LAB REVIEW:  CBC:   Lab Results   Component Value Date/Time    WBC 8.4 2022 10:10 AM    HGB 16.7 2022 10:10 AM    HCT 55.1 11/17/2022 10:10 AM     11/17/2022 10:10 AM     Lipids:   Lab Results   Component Value Date    HDL 41 07/01/2022    LDLCALC 116 (H) 07/01/2022    LDLDIRECT 73 11/02/2018    TRIGLYCFAST 110 07/01/2022    CHOLFAST 179 07/01/2022     Renal:   Lab Results   Component Value Date/Time    BUN 12 11/17/2022 10:10 AM    CREATININE 0.8 11/17/2022 10:10 AM     11/17/2022 10:10 AM    K 4.7 11/17/2022 10:10 AM    ALT 5 11/17/2022 10:10 AM    AST 12 11/17/2022 10:10 AM    GLUCOSE 143 11/17/2022 10:10 AM    GLUF 117 02/01/2018 10:45 AM     PT/INR:   Lab Results   Component Value Date/Time    INR 1.07 10/24/2016 11:05 AM     A1C:   Lab Results   Component Value Date    LABA1C 5.8 07/22/2020           Mark Mabry MD, 1/5/2023 , 10:46 AM

## 2023-01-16 ENCOUNTER — HOSPITAL ENCOUNTER (OUTPATIENT)
Dept: ULTRASOUND IMAGING | Age: 58
Discharge: HOME | End: 2023-01-16
Payer: COMMERCIAL

## 2023-01-16 ENCOUNTER — HOSPITAL ENCOUNTER (OUTPATIENT)
Age: 58
Discharge: HOME | End: 2023-01-16
Payer: COMMERCIAL

## 2023-01-16 DIAGNOSIS — I71.40 ABDOMINAL AORTIC ANEURYSM (AAA) WITHOUT RUPTURE, UNSPECIFIED PART: ICD-10-CM

## 2023-01-16 LAB
CHOLESTEROL, FASTING: 191 MG/DL
HDLC SERPL-MCNC: 49 MG/DL
LDL CHOLESTEROL CALCULATED: 126 MG/DL
TRIGLYCERIDE, FASTING: 82 MG/DL

## 2023-01-16 PROCEDURE — 80061 LIPID PANEL: CPT

## 2023-01-16 PROCEDURE — 36415 COLL VENOUS BLD VENIPUNCTURE: CPT

## 2023-01-16 PROCEDURE — 93978 VASCULAR STUDY: CPT

## 2023-01-18 ENCOUNTER — HOSPITAL ENCOUNTER (OUTPATIENT)
Dept: INFUSION THERAPY | Age: 58
Setting detail: INFUSION SERIES
Discharge: HOME | End: 2023-01-18
Payer: COMMERCIAL

## 2023-01-18 VITALS
RESPIRATION RATE: 14 BRPM | SYSTOLIC BLOOD PRESSURE: 124 MMHG | TEMPERATURE: 98.4 F | OXYGEN SATURATION: 94 % | DIASTOLIC BLOOD PRESSURE: 94 MMHG | HEART RATE: 76 BPM

## 2023-01-18 DIAGNOSIS — D75.1 POLYCYTHEMIA: Primary | ICD-10-CM

## 2023-01-18 PROCEDURE — 99195 PHLEBOTOMY: CPT

## 2023-01-18 PROCEDURE — 99211 OFF/OP EST MAY X REQ PHY/QHP: CPT

## 2023-01-18 NOTE — PROGRESS NOTES
Diagnosis: polycythemia    Pre-Phlebotomy: BP /86   Pulse 94   Temp 98.4 °F (36.9 °C) (Infrared)   Resp 14   SpO2 94%       Post-Phlebotomy: /86, HR 57    Volume Removed: 163 grams    Complications: none    Comments: tolerated well        Tigre Cagle RN

## 2023-01-19 ENCOUNTER — TELEPHONE (OUTPATIENT)
Dept: PULMONOLOGY | Age: 58
End: 2023-01-19

## 2023-01-19 NOTE — TELEPHONE ENCOUNTER
[address] [facility], [place].  [address] [facility] 140 [facility], [address]  [phone]                                History and Physical     NAME: Farshad Phillips Sr.   :  1933   MRN:  [id number]     HPI:  The patient was seen and examined. Past Surgical History:   Procedure Laterality Date    ABDOMEN SURGERY PROC UNLISTED      gall bladder    ABDOMEN SURGERY PROC UNLISTED  15 yrs ago    right hernia repair    HX HEENT      EYE SURGERY 40 YRS AGO.  HX ORTHOPAEDIC      fluid removed both knees    HX ORTHOPAEDIC  2010    bilateral knee replacements     Past Medical History:   Diagnosis Date    Acid reflux     Arthritis     right  left knees    Back pain, chronic 2014    Cancer ([address]     esophegeal    Decreased hearing of both ears 2017    DNR (do not resuscitate) 2017    Gastroesophageal reflux disease without esophagitis 2017    GERD (gastroesophageal reflux disease)     Hip pain, left 2014    Hypercholesterolemia     Hypertension     Need for varicella vaccine 2014    Rash of entire body 9/15/2016    Restless leg syndrome      Social History   Substance Use Topics    Smoking status: Former Smoker     Years: 0.00     Quit date: 1970    Smokeless tobacco: Never Used    Alcohol use Yes      Comment: OCCASSIONALLY     No Known Allergies  Family History   Problem Relation Age of Onset    Heart Disease Sister      No current facility-administered medications for this encounter. PHYSICAL EXAM:  General: WD, WN. Alert, cooperative, no acute distress    HEENT: NC, Atraumatic. PERRLA, EOMI. Anicteric sclerae. Lungs:  CTA Bilaterally. No Wheezing/Rhonchi/Rales. Heart:  Regular  rhythm,  No murmur, No Rubs, No Gallops  Abdomen: Soft, Non distended, Non tender.  +Bowel sounds, no HSM  Extremities: No c/c/e  Neurologic:  CN 2-12 gi, Alert and oriented X 3.   No acute neurological distress   Psych:   Good insight. Not anxious nor Lvm to reschedule appt because pt hasn't done CT scan. Also gave pt central scheduling and our to reschedule. agitated. The heart, lungs and mental status were satisfactory for the administration of MAC sedation and for the procedure.       Mallampati score: 3       Assessment:   · Gastric mass    Plan:   · Endoscopic procedure  · MAC sedation   ·

## 2023-02-19 NOTE — PROGRESS NOTES
Patient Name:  Vilma Schlatter  Patient :  1965  Patient MRN:  [id number]     Primary Oncologist: Ruchi Babcock MD  Referring Provider: Daniel Colon MD     Date of Service: 2023      Chief Complaint:    Chief Complaint   Patient presents with    Follow-up     Patient Active Problem List:     Essential hypertension     Mixed hyperlipidemia     Incidental lung nodule 4 mm RUL. f/u in 10/15     Kidney stone on right side     Coronary artery disease s/p MI, CABG x 4     Bilateral carotid artery disease ([address]     Abdominal aortic aneurysm (AAA) without rupture (HCC)     COPD, severe (HCC)     Weight gain     JOSE MANUEL (obstructive sleep apnea)     Obesity (BMI 30-39. 9)     Hypersomnia     Cigarette smoker     Polycythemia    HPI:   Vilma Schlatter is a 47-year-old very pleasant gentleman with medical history significant for hypertension, hyperlipidemia, coronary artery disease, status post CABG, bilateral carotid artery disease, COPD, JOSE MANUEL, chronic cigarette smoker and obesity, initially referred to me on 8/3/2022 for evaluation of his erythrocytosis. He was noted to have erythrocytosis since 2021 and it has been persistent since then. He does not have leukocytosis or thrombocytosis. He does not have family history of erythrocytosis and he denies height altitude living or testosterone use. Laboratory work-ups done on 8/3/2022 showed significant erythrocytosis [hemoglobin 18.2, hematocrit 55.2] and elevated erythropoietin level [52]. The rest of the blood tests, including ESR, LDH, JAK2 V617F, JAK2 exon 12-13, MPL, CALR and BCR-ABL1, well within normal range. On 2023, he presented to me for follow-up. He was referred to me for evaluation of erythrocytosis. I reviewed with him findings on labs done on 8/3/2022. All the work-ups for myeloproliferative neoplasms were within normal range.   His erythropoietin level is elevated and findings are most consistent with secondary erythrocytosis from chronic hypoxemia due to smoking, sleep apnea and underlying lung disease. Since he has significant polycythemia with signs and symptoms suggestive of hyperviscosity state, we started phlebotomies. He is s/p 4 phlebotomies and he is feeling better after phlebotomies. His Hematocrit today was 52.7 and he doesn't need phlebotomy. Will try to keep his hematocrit below 55. Polyuria - will check UA reflex to urine culture today. His glucose is mildly elevated. It could be due to that as well. I asked him to f/u with PCP. He does not have any significant symptoms at today visit. Past Medical History:     Significant for  1. Hypertension  2. Hyperlipidemia  3. Coronary artery disease status post CABG  4.  Bilateral carotid artery disease  5. COPD  6. Obstructive sleep apnea  7. Chronic cigarette smoker  8. Kidney stones  9. Obesity    Past Surgery History:    Significant for  1. Coronary artery bypass grafting on 10/24/2016  2. Umbilical hernia repair  3. Kidney stone surgery    Social History:   He is a current smoker and he smoked 1 pack a day since he was 19. He socially drinks alcohol and he denies illicit drug abuse. Family History:    Significant for colon cancer, hypertension, diabetes mellitus and coronary artery disease in his mother. His father has small cell lung cancer. Allergies   Allergen Reactions    Guaifenesin Er Diarrhea    Sulfa Antibiotics Rash     Review of Systems: \"Per interval history; otherwise 10 point ROS is negative. \"  His energy level is pretty good today and his sleep is fine. He doesn't have fever, chills, night sweats, cough, shortness of breath, chest pain, hemoptysis or palpitations. His bowel and bladder functions are normal. He denies nausea, vomiting, abdominal pain, diarrhea, constipation, dysuria, loss of appetite or weight loss. He denies neuropathy and he doesn't have bleeding or clotting issues.  He doesn't have any pain in his body. No anxiety or depression. The rest of the systems are unremarkable. Vital Signs: /82 (Site: Left Upper Arm, Position: Sitting, Cuff Size: Medium Adult)   Pulse 71   Temp 98.2 °F (36.8 °C) (Temporal)   Ht 5' 10\" (1.778 m)   Wt 255 lb (115.7 kg)   SpO2 93%   BMI 36.59 kg/m²      Physical Exam:  CONSTITUTIONAL: awake, alert, cooperative, no apparent distress   EYES: pupils equal, round and reactive to light, sclera clear, normal conjunctiva  ENT: Normocephalic, without obvious abnormality, atraumatic  NECK: supple, symmetrical, no jugular venous distension, no carotid bruits   HEMATOLOGIC/LYMPHATIC: no cervical, supraclavicular or axillary lymphadenopathy   LUNGS: VBS, no wheezes, clear to auscultation, no crackles, no increased work of breathing, no rhonchi,   CARDIOVASCULAR: regular rate and rhythm, normal S1 and S2, no murmur noted  ABDOMEN: normal bowel sounds x 4, soft, non-distended, non-tender, no masses palpated, no hepatosplenomegaly   MUSCULOSKELETAL: full range of motion noted, tone is normal  NEUROLOGIC: awake, alert, oriented to name, place and time. Motor skills grossly intact. SKIN: appears intact, normal skin color, normal texture, normal turgor, no jaundice.    EXTREMITIES: no leg swelling, no clubbing, no cyanosis, no LE edema,     Labs:  Hematology:  Lab Results   Component Value Date    WBC 9.0 02/21/2023    RBC 5.97 02/21/2023    HGB 17.1 02/21/2023    HCT 52.7 (H) 02/21/2023    MCV 88.3 02/21/2023    MCH 28.6 02/21/2023    MCHC 32.4 02/21/2023    RDW 15.2 (H) 02/21/2023     02/21/2023    MPV 9.3 02/21/2023    SEGSPCT 60.8 02/21/2023    EOSRELPCT 1.7 02/21/2023    BASOPCT 0.4 02/21/2023    LYMPHOPCT 27.1 02/21/2023    MONOPCT 10.0 (H) 02/21/2023    SEGSABS 5.5 02/21/2023    EOSABS 0.2 02/21/2023    BASOSABS 0.0 02/21/2023    LYMPHSABS 2.4 02/21/2023    MONOSABS 0.9 02/21/2023    DIFFTYPE AUTOMATED DIFFERENTIAL 02/21/2023     Lab Results   Component Value Date ESR 6 11/17/2022     Chemistry:  Lab Results   Component Value Date     11/17/2022    K 4.7 11/17/2022    CL 99 11/17/2022    CO2 34 (H) 11/17/2022    BUN 12 11/17/2022    CREATININE 0.8 (L) 11/17/2022    GLUCOSE 143 (H) 11/17/2022    CALCIUM 9.3 11/17/2022    PROT 6.0 (L) 11/17/2022    LABALBU 3.9 11/17/2022    BILITOT 0.4 11/17/2022    ALKPHOS 60 11/17/2022    AST 12 (L) 11/17/2022    ALT 5 (L) 11/17/2022    LABGLOM >60 11/17/2022    GFRAA >60 08/03/2022    AGRATIO 2.3 (H) 07/01/2022    GLOB 1.9 10/22/2021    MG 2.0 10/27/2016    POCCA 1.29 10/24/2016    POCGLU 117 (H) 10/26/2016     Lab Results   Component Value Date     11/17/2022     No components found for: LD  Lab Results   Component Value Date    TSHHS 1.351 04/18/2011     Immunology:  Lab Results   Component Value Date    PROT 6.0 (L) 11/17/2022     No results found for: Anna Menon, KLFLCR  No results found for: B2M  Coagulation Panel:  Lab Results   Component Value Date    PROTIME 12.2 10/24/2016    INR 1.07 10/24/2016    APTT 28.3 10/24/2016    DDIMER 312 (H) 01/03/2017     Anemia Panel:  No results found for: Efrain Distad  Tumor Markers:  Lab Results   Component Value Date    PSA 0.53 07/01/2022        Observations:  PHQ-9 Total Score: 0 (2/21/2023 10:16 AM)     Assessment   Erythrocytosis    Plan:  Donna Douglas is a 58-year-old very pleasant gentleman who was noted to have erythrocytosis since December 2021 and it has been persistent since then. He does not have leukocytosis or thrombocytosis. He does not have family history of erythrocytosis and he denies height altitude living or testosterone use. Laboratory work-ups done on 8/3/2022 showed significant erythrocytosis [hemoglobin 18.2, hematocrit 55.2] and elevated erythropoietin level [52]. The rest of the blood tests, including ESR, LDH, JAK2 V617F, JAK2 exon 12-13, MPL, CALR and BCR-ABL1, well within normal range.     On February 21, 2023, he presented to me for follow-up. He was referred to me for evaluation of erythrocytosis. I reviewed with him findings on labs done on 8/3/2022. All the work-ups for myeloproliferative neoplasms were within normal range. His erythropoietin level is elevated and findings are most consistent with secondary erythrocytosis from chronic hypoxemia due to smoking, sleep apnea and underlying lung disease. Since he has significant polycythemia with signs and symptoms suggestive of hyperviscosity state, we started phlebotomies. He is s/p 4 phlebotomies and he is feeling better after phlebotomies. His Hematocrit today was 52.7 and he doesn't need phlebotomy. Will try to keep his hematocrit below 55. Polyuria - will check UA reflex to urine culture today. His glucose is mildly elevated. It could be due to that as well. I asked him to f/u with PCP. I answered all his questions and concerns for today. Recent imaging and labs were reviewed and discussed with the patient.

## 2023-02-21 ENCOUNTER — OFFICE VISIT (OUTPATIENT)
Dept: ONCOLOGY | Age: 58
End: 2023-02-21
Payer: COMMERCIAL

## 2023-02-21 ENCOUNTER — HOSPITAL ENCOUNTER (OUTPATIENT)
Dept: INFUSION THERAPY | Age: 58
Discharge: HOME | End: 2023-02-21
Payer: COMMERCIAL

## 2023-02-21 ENCOUNTER — HOSPITAL ENCOUNTER (OUTPATIENT)
Age: 58
Setting detail: SPECIMEN
Discharge: HOME | End: 2023-02-21
Payer: COMMERCIAL

## 2023-02-21 VITALS
SYSTOLIC BLOOD PRESSURE: 130 MMHG | HEIGHT: 70 IN | OXYGEN SATURATION: 93 % | HEART RATE: 71 BPM | TEMPERATURE: 98.2 F | DIASTOLIC BLOOD PRESSURE: 82 MMHG | BODY MASS INDEX: 36.51 KG/M2 | WEIGHT: 255 LBS

## 2023-02-21 DIAGNOSIS — D75.1 POLYCYTHEMIA: Primary | ICD-10-CM

## 2023-02-21 DIAGNOSIS — D75.1 POLYCYTHEMIA: ICD-10-CM

## 2023-02-21 LAB
ALBUMIN SERPL-MCNC: 4.4 GM/DL (ref 3.4–5)
ALP BLD-CCNC: 79 IU/L (ref 40–129)
ALT SERPL-CCNC: 17 U/L (ref 10–40)
ANION GAP SERPL CALCULATED.3IONS-SCNC: 9 MMOL/L (ref 4–16)
AST SERPL-CCNC: 19 IU/L (ref 15–37)
BACTERIA: NEGATIVE /HPF
BASOPHILS ABSOLUTE: 0 K/CU MM
BASOPHILS RELATIVE PERCENT: 0.4 % (ref 0–1)
BILIRUB SERPL-MCNC: 0.6 MG/DL (ref 0–1)
BILIRUBIN URINE: NEGATIVE MG/DL
BLOOD, URINE: ABNORMAL
BUN SERPL-MCNC: 14 MG/DL (ref 6–23)
CALCIUM SERPL-MCNC: 9.8 MG/DL (ref 8.3–10.6)
CHLORIDE BLD-SCNC: 100 MMOL/L (ref 99–110)
CLARITY: CLEAR
CO2: 32 MMOL/L (ref 21–32)
COLOR: YELLOW
CREAT SERPL-MCNC: 0.9 MG/DL (ref 0.9–1.3)
DIFFERENTIAL TYPE: ABNORMAL
EOSINOPHILS ABSOLUTE: 0.2 K/CU MM
EOSINOPHILS RELATIVE PERCENT: 1.7 % (ref 0–3)
GFR SERPL CREATININE-BSD FRML MDRD: >60 ML/MIN/1.73M2
GLUCOSE SERPL-MCNC: 113 MG/DL (ref 70–99)
GLUCOSE, URINE: NEGATIVE MG/DL
HCT VFR BLD CALC: 52.7 % (ref 42–52)
HEMOGLOBIN: 17.1 GM/DL (ref 13.5–18)
HYALINE CASTS: 0 /LPF
KETONES, URINE: NEGATIVE MG/DL
LACTATE DEHYDROGENASE: 206 IU/L (ref 120–246)
LEUKOCYTE ESTERASE, URINE: NEGATIVE
LYMPHOCYTES ABSOLUTE: 2.4 K/CU MM
LYMPHOCYTES RELATIVE PERCENT: 27.1 % (ref 24–44)
MCH RBC QN AUTO: 28.6 PG (ref 27–31)
MCHC RBC AUTO-ENTMCNC: 32.4 % (ref 32–36)
MCV RBC AUTO: 88.3 FL (ref 78–100)
MONOCYTES ABSOLUTE: 0.9 K/CU MM
MONOCYTES RELATIVE PERCENT: 10 % (ref 0–4)
MUCUS: ABNORMAL HPF
NITRITE URINE, QUANTITATIVE: NEGATIVE
PDW BLD-RTO: 15.2 % (ref 11.7–14.9)
PH, URINE: 5.5 (ref 5–8)
PLATELET # BLD: 234 K/CU MM (ref 140–440)
PMV BLD AUTO: 9.3 FL (ref 7.5–11.1)
POTASSIUM SERPL-SCNC: 4.3 MMOL/L (ref 3.5–5.1)
PROTEIN UA: NEGATIVE MG/DL
RBC # BLD: 5.97 M/CU MM (ref 4.6–6.2)
RBC URINE: 7 /HPF (ref 0–3)
SEGMENTED NEUTROPHILS ABSOLUTE COUNT: 5.5 K/CU MM
SEGMENTED NEUTROPHILS RELATIVE PERCENT: 60.8 % (ref 36–66)
SODIUM BLD-SCNC: 141 MMOL/L (ref 135–145)
SPECIFIC GRAVITY UA: 1.02 (ref 1–1.03)
TOTAL PROTEIN: 6.6 GM/DL (ref 6.4–8.2)
TRICHOMONAS: ABNORMAL /HPF
UROBILINOGEN, URINE: 0.2 MG/DL (ref 0.2–1)
WBC # BLD: 9 K/CU MM (ref 4–10.5)
WBC UA: 1 /HPF (ref 0–2)

## 2023-02-21 PROCEDURE — 36415 COLL VENOUS BLD VENIPUNCTURE: CPT

## 2023-02-21 PROCEDURE — 83615 LACTATE (LD) (LDH) ENZYME: CPT

## 2023-02-21 PROCEDURE — 81001 URINALYSIS AUTO W/SCOPE: CPT

## 2023-02-21 PROCEDURE — 80053 COMPREHEN METABOLIC PANEL: CPT

## 2023-02-21 PROCEDURE — 3079F DIAST BP 80-89 MM HG: CPT | Performed by: INTERNAL MEDICINE

## 2023-02-21 PROCEDURE — 81003 URINALYSIS AUTO W/O SCOPE: CPT | Performed by: INTERNAL MEDICINE

## 2023-02-21 PROCEDURE — 85025 COMPLETE CBC W/AUTO DIFF WBC: CPT

## 2023-02-21 PROCEDURE — 99211 OFF/OP EST MAY X REQ PHY/QHP: CPT

## 2023-02-21 PROCEDURE — 3075F SYST BP GE 130 - 139MM HG: CPT | Performed by: INTERNAL MEDICINE

## 2023-02-21 PROCEDURE — 99213 OFFICE O/P EST LOW 20 MIN: CPT | Performed by: INTERNAL MEDICINE

## 2023-02-21 ASSESSMENT — PATIENT HEALTH QUESTIONNAIRE - PHQ9
SUM OF ALL RESPONSES TO PHQ QUESTIONS 1-9: 0
2. FEELING DOWN, DEPRESSED OR HOPELESS: 0
SUM OF ALL RESPONSES TO PHQ QUESTIONS 1-9: 0
1. LITTLE INTEREST OR PLEASURE IN DOING THINGS: 0
SUM OF ALL RESPONSES TO PHQ9 QUESTIONS 1 & 2: 0

## 2023-02-21 NOTE — PROGRESS NOTES
MA Rooming Questions  Patient: Thiago Mendoza  MRN: [id number]    Date: 2/21/2023        1. Do you have any new issues? yes - patient states he is having frequent urination, patient states it is only slight pain and burning when urinating. He believes he may have a possible kidney stone or UTI he is willing to provide a urine sample. 2. Do you need any refills on medications?    no    3. Have you had any imaging done since your last visit?   no    4. Have you been hospitalized or seen in the emergency room since your last visit here?   no    5. Did the patient have a depression screening completed today?  Yes    PHQ-9 Total Score: 0 (2/21/2023 10:16 AM)       PHQ-9 Given to (if applicable):               PHQ-9 Score (if applicable):                     [] Positive     []  Negative              Does question #9 need addressed (if applicable)                     [] Yes    []  No               Sal Calixto CMA

## 2023-02-22 ENCOUNTER — CLINICAL DOCUMENTATION (OUTPATIENT)
Dept: ONCOLOGY | Age: 58
End: 2023-02-22

## 2023-02-22 NOTE — PROGRESS NOTES
This nurse called the patient @ [phone] to review lab results. However there was no answer. This nurse left a VM for the patient with UA results and that Dr Merissa Adair would like him to follow up with his PCP in regards to his polyuria. This RN's direct number provided should he have further questions or needs.

## 2023-06-02 NOTE — TELEPHONE ENCOUNTER
Left message for patient at      Telephone Information:   Mobile [phone]    to schedule procedure.  Patient to return call to Mamie DIOP [phone].    Will attach a contact letter via [employer] portal.    Tried to call patient w/results carotid Doppler; no answer. L/M for patient to call back.

## 2023-06-26 ENCOUNTER — HOSPITAL ENCOUNTER (OUTPATIENT)
Dept: INFUSION THERAPY | Age: 58
Discharge: HOME | End: 2023-06-26
Payer: COMMERCIAL

## 2023-06-26 ENCOUNTER — CLINICAL DOCUMENTATION (OUTPATIENT)
Dept: ONCOLOGY | Age: 58
End: 2023-06-26

## 2023-06-26 ENCOUNTER — OFFICE VISIT (OUTPATIENT)
Dept: ONCOLOGY | Age: 58
End: 2023-06-26
Payer: COMMERCIAL

## 2023-06-26 VITALS
HEIGHT: 70 IN | HEART RATE: 74 BPM | DIASTOLIC BLOOD PRESSURE: 69 MMHG | WEIGHT: 254.2 LBS | OXYGEN SATURATION: 87 % | SYSTOLIC BLOOD PRESSURE: 109 MMHG | TEMPERATURE: 97.7 F | BODY MASS INDEX: 36.39 KG/M2

## 2023-06-26 DIAGNOSIS — D75.1 POLYCYTHEMIA: ICD-10-CM

## 2023-06-26 DIAGNOSIS — F17.210 CIGARETTE SMOKER: ICD-10-CM

## 2023-06-26 DIAGNOSIS — D75.1 POLYCYTHEMIA: Primary | ICD-10-CM

## 2023-06-26 DIAGNOSIS — Z72.0 TOBACCO ABUSE DISORDER: ICD-10-CM

## 2023-06-26 LAB
ALBUMIN SERPL-MCNC: 4 GM/DL (ref 3.4–5)
ALP BLD-CCNC: 86 IU/L (ref 40–129)
ALT SERPL-CCNC: 10 U/L (ref 10–40)
ANION GAP SERPL CALCULATED.3IONS-SCNC: 8 MMOL/L (ref 4–16)
AST SERPL-CCNC: 20 IU/L (ref 15–37)
BASOPHILS ABSOLUTE: 0 K/CU MM
BASOPHILS RELATIVE PERCENT: 0.4 % (ref 0–1)
BILIRUB SERPL-MCNC: 0.7 MG/DL (ref 0–1)
BUN SERPL-MCNC: 11 MG/DL (ref 6–23)
CALCIUM SERPL-MCNC: 8.9 MG/DL (ref 8.3–10.6)
CHLORIDE BLD-SCNC: 97 MMOL/L (ref 99–110)
CO2: 34 MMOL/L (ref 21–32)
CREAT SERPL-MCNC: 0.8 MG/DL (ref 0.9–1.3)
DIFFERENTIAL TYPE: ABNORMAL
EOSINOPHILS ABSOLUTE: 0.2 K/CU MM
EOSINOPHILS RELATIVE PERCENT: 1.9 % (ref 0–3)
ERYTHROCYTE SEDIMENTATION RATE: 3 MM/HR (ref 0–20)
GFR SERPL CREATININE-BSD FRML MDRD: >60 ML/MIN/1.73M2
GLUCOSE SERPL-MCNC: 128 MG/DL (ref 70–99)
HCT VFR BLD CALC: 55.5 % (ref 42–52)
HEMOGLOBIN: 17 GM/DL (ref 13.5–18)
LACTATE DEHYDROGENASE: 271 IU/L (ref 120–246)
LYMPHOCYTES ABSOLUTE: 1.7 K/CU MM
LYMPHOCYTES RELATIVE PERCENT: 16.7 % (ref 24–44)
MCH RBC QN AUTO: 28.2 PG (ref 27–31)
MCHC RBC AUTO-ENTMCNC: 30.6 % (ref 32–36)
MCV RBC AUTO: 92 FL (ref 78–100)
MONOCYTES ABSOLUTE: 1.1 K/CU MM
MONOCYTES RELATIVE PERCENT: 11.1 % (ref 0–4)
PDW BLD-RTO: 14.7 % (ref 11.7–14.9)
PLATELET # BLD: 208 K/CU MM (ref 140–440)
PMV BLD AUTO: 9.2 FL (ref 7.5–11.1)
POTASSIUM SERPL-SCNC: 4.2 MMOL/L (ref 3.5–5.1)
RBC # BLD: 6.03 M/CU MM (ref 4.6–6.2)
SEGMENTED NEUTROPHILS ABSOLUTE COUNT: 7.1 K/CU MM
SEGMENTED NEUTROPHILS RELATIVE PERCENT: 69.9 % (ref 36–66)
SODIUM BLD-SCNC: 139 MMOL/L (ref 135–145)
TOTAL PROTEIN: 6 GM/DL (ref 6.4–8.2)
WBC # BLD: 10.2 K/CU MM (ref 4–10.5)

## 2023-06-26 PROCEDURE — 99211 OFF/OP EST MAY X REQ PHY/QHP: CPT

## 2023-06-26 PROCEDURE — 80053 COMPREHEN METABOLIC PANEL: CPT

## 2023-06-26 PROCEDURE — 3074F SYST BP LT 130 MM HG: CPT | Performed by: INTERNAL MEDICINE

## 2023-06-26 PROCEDURE — 85025 COMPLETE CBC W/AUTO DIFF WBC: CPT

## 2023-06-26 PROCEDURE — 3078F DIAST BP <80 MM HG: CPT | Performed by: INTERNAL MEDICINE

## 2023-06-26 PROCEDURE — 85652 RBC SED RATE AUTOMATED: CPT

## 2023-06-26 PROCEDURE — 99213 OFFICE O/P EST LOW 20 MIN: CPT | Performed by: INTERNAL MEDICINE

## 2023-06-26 PROCEDURE — 83615 LACTATE (LD) (LDH) ENZYME: CPT

## 2023-06-26 PROCEDURE — 36415 COLL VENOUS BLD VENIPUNCTURE: CPT

## 2023-06-26 ASSESSMENT — PATIENT HEALTH QUESTIONNAIRE - PHQ9
SUM OF ALL RESPONSES TO PHQ QUESTIONS 1-9: 0
SUM OF ALL RESPONSES TO PHQ9 QUESTIONS 1 & 2: 0
2. FEELING DOWN, DEPRESSED OR HOPELESS: 0
SUM OF ALL RESPONSES TO PHQ QUESTIONS 1-9: 0
1. LITTLE INTEREST OR PLEASURE IN DOING THINGS: 0
SUM OF ALL RESPONSES TO PHQ QUESTIONS 1-9: 0
SUM OF ALL RESPONSES TO PHQ QUESTIONS 1-9: 0

## 2023-06-27 ENCOUNTER — TELEPHONE (OUTPATIENT)
Dept: ONCOLOGY | Age: 58
End: 2023-06-27

## 2023-07-14 ENCOUNTER — HOSPITAL ENCOUNTER (OUTPATIENT)
Dept: CT IMAGING | Age: 58
Discharge: HOME | End: 2023-07-14
Attending: INTERNAL MEDICINE
Payer: COMMERCIAL

## 2023-07-14 ENCOUNTER — HOSPITAL ENCOUNTER (OUTPATIENT)
Dept: INFUSION THERAPY | Age: 58
Setting detail: INFUSION SERIES
Discharge: HOME | End: 2023-07-14
Payer: COMMERCIAL

## 2023-07-14 VITALS
OXYGEN SATURATION: 90 % | RESPIRATION RATE: 16 BRPM | SYSTOLIC BLOOD PRESSURE: 117 MMHG | HEART RATE: 78 BPM | DIASTOLIC BLOOD PRESSURE: 85 MMHG | TEMPERATURE: 97.9 F

## 2023-07-14 DIAGNOSIS — Z72.0 TOBACCO ABUSE DISORDER: ICD-10-CM

## 2023-07-14 DIAGNOSIS — D75.1 POLYCYTHEMIA: ICD-10-CM

## 2023-07-14 DIAGNOSIS — D75.1 POLYCYTHEMIA: Primary | ICD-10-CM

## 2023-07-14 DIAGNOSIS — F17.210 CIGARETTE SMOKER: ICD-10-CM

## 2023-07-14 PROCEDURE — 71271 CT THORAX LUNG CANCER SCR C-: CPT

## 2023-07-14 PROCEDURE — 99195 PHLEBOTOMY: CPT

## 2023-07-14 NOTE — PROCEDURES
Pre-phlebotomy:  Pulse:  84 Blood Pressure:  130/80    Post-phlebotomy:  Pulse:  78 Blood Pressure:      Volume Removed:  822 grams    Complications:  None    Comments:    Tolerated well

## 2023-07-14 NOTE — PLAN OF CARE
Ambulatory to unit room 3 for Phlebotomy. Orientated to unit. Procedure and plan of care explained. Questions answered. Understanding verbalized.

## 2023-07-14 NOTE — DISCHARGE INSTRUCTIONS
Discharge instructions:     *Do NOT skip meals today   *Drink PLENTY of liquids today, especially water   *Rest today   *Continue your medications as prescribed   *Return to your physician as planned    * If you feel a little lightheaded, lie down for a while,  and have some snacks. * Take your time when standing up from a sitting or  lying position     If you feel a little lightheaded, sit or lie back down     Have a snack or have something to drink    Call your doctor now or seek immediate medical care if:   *You are dizzy or lightheaded or feel like you may faint >1 day   * You have signs of infection, such as: Increased pain, swelling, warmth, or redness    Red streaks leading from the area    Pus draining from the area    A fever         Thank you for choosing [facility].  It is our pleasure to serve you    [facility]  Hours: 8:00-5:00  Phone: [phone]

## 2023-07-31 DIAGNOSIS — J44.9 COPD, SEVERE (HCC): ICD-10-CM

## 2023-07-31 RX ORDER — ALBUTEROL SULFATE 90 UG/1
AEROSOL, METERED RESPIRATORY (INHALATION)
Qty: 17 G | Refills: 1 | Status: SHIPPED | OUTPATIENT
Start: 2023-07-31

## 2023-08-28 RX ORDER — ATORVASTATIN CALCIUM 40 MG/1
TABLET, FILM COATED ORAL
Qty: 30 TABLET | Refills: 1 | Status: SHIPPED | OUTPATIENT
Start: 2023-08-28

## 2023-09-15 ENCOUNTER — TELEPHONE (OUTPATIENT)
Dept: INTERNAL MEDICINE CLINIC | Age: 58
End: 2023-09-15

## 2023-09-15 ENCOUNTER — OFFICE VISIT (OUTPATIENT)
Dept: INTERNAL MEDICINE CLINIC | Age: 58
End: 2023-09-15
Payer: COMMERCIAL

## 2023-09-15 VITALS
OXYGEN SATURATION: 95 % | WEIGHT: 254 LBS | BODY MASS INDEX: 36.36 KG/M2 | DIASTOLIC BLOOD PRESSURE: 70 MMHG | HEART RATE: 73 BPM | SYSTOLIC BLOOD PRESSURE: 120 MMHG | HEIGHT: 70 IN

## 2023-09-15 DIAGNOSIS — E78.2 MIXED HYPERLIPIDEMIA: ICD-10-CM

## 2023-09-15 DIAGNOSIS — I25.10 CORONARY ARTERY DISEASE INVOLVING NATIVE CORONARY ARTERY OF NATIVE HEART WITHOUT ANGINA PECTORIS: ICD-10-CM

## 2023-09-15 DIAGNOSIS — R91.1 INCIDENTAL LUNG NODULE: ICD-10-CM

## 2023-09-15 DIAGNOSIS — D75.1 POLYCYTHEMIA: ICD-10-CM

## 2023-09-15 DIAGNOSIS — G47.33 OSA (OBSTRUCTIVE SLEEP APNEA): ICD-10-CM

## 2023-09-15 DIAGNOSIS — Z23 INFLUENZA VACCINE ADMINISTERED: ICD-10-CM

## 2023-09-15 DIAGNOSIS — I10 ESSENTIAL HYPERTENSION: ICD-10-CM

## 2023-09-15 DIAGNOSIS — Z72.0 TOBACCO ABUSE DISORDER: ICD-10-CM

## 2023-09-15 DIAGNOSIS — J44.9 COPD, SEVERE (HCC): Primary | ICD-10-CM

## 2023-09-15 PROCEDURE — 3074F SYST BP LT 130 MM HG: CPT | Performed by: INTERNAL MEDICINE

## 2023-09-15 PROCEDURE — 90674 CCIIV4 VAC NO PRSV 0.5 ML IM: CPT | Performed by: INTERNAL MEDICINE

## 2023-09-15 PROCEDURE — 99214 OFFICE O/P EST MOD 30 MIN: CPT | Performed by: INTERNAL MEDICINE

## 2023-09-15 PROCEDURE — 90471 IMMUNIZATION ADMIN: CPT | Performed by: INTERNAL MEDICINE

## 2023-09-15 PROCEDURE — 3078F DIAST BP <80 MM HG: CPT | Performed by: INTERNAL MEDICINE

## 2023-09-15 RX ORDER — ATORVASTATIN CALCIUM 40 MG/1
40 TABLET, FILM COATED ORAL DAILY
Qty: 90 TABLET | Refills: 0 | Status: SHIPPED | OUTPATIENT
Start: 2023-09-15 | End: 2023-12-14

## 2023-09-15 RX ORDER — ALBUTEROL SULFATE 90 UG/1
2 AEROSOL, METERED RESPIRATORY (INHALATION) EVERY 4 HOURS PRN
Qty: 17 G | Refills: 1 | Status: SHIPPED | OUTPATIENT
Start: 2023-09-15 | End: 2024-09-14

## 2023-09-15 RX ORDER — FLUTICASONE FUROATE, UMECLIDINIUM BROMIDE AND VILANTEROL TRIFENATATE 100; 62.5; 25 UG/1; UG/1; UG/1
1 POWDER RESPIRATORY (INHALATION) DAILY
Qty: 3 EACH | Refills: 1 | Status: SHIPPED | OUTPATIENT
Start: 2023-09-15 | End: 2024-03-13

## 2023-09-15 RX ORDER — AMLODIPINE BESYLATE 5 MG/1
5 TABLET ORAL DAILY
Qty: 90 TABLET | Refills: 1 | Status: SHIPPED | OUTPATIENT
Start: 2023-09-15 | End: 2024-03-13

## 2023-09-15 RX ORDER — CARVEDILOL 6.25 MG/1
6.25 TABLET ORAL 2 TIMES DAILY
Qty: 120 TABLET | Refills: 1 | Status: SHIPPED | OUTPATIENT
Start: 2023-09-15 | End: 2024-01-13

## 2023-09-15 RX ORDER — ASPIRIN 81 MG/1
81 TABLET ORAL DAILY
Qty: 90 TABLET | Refills: 1 | Status: SHIPPED | OUTPATIENT
Start: 2023-09-15 | End: 2024-03-13

## 2023-09-15 NOTE — PROGRESS NOTES
Patient given website information for trelegy to help with cost of medication to get coupon.     Patient asked that lung scan be sent to heart Dr. Larisa Kurtz

## 2023-09-15 NOTE — PROGRESS NOTES
Vaccine Information Sheet, \"Influenza - Inactivated\"  given to Marie Porter, or parent/legal guardian of  Marie Porter and verbalized understanding. Patient responses:    Have you ever had a reaction to a flu vaccine? No  Are you able to eat eggs without adverse effects? Yes  Do you have any current illness? No  Have you ever had Guillian Padroni Syndrome? No    Flu vaccine given per order. Please see immunization tab.

## 2023-10-27 ENCOUNTER — HOSPITAL ENCOUNTER (OUTPATIENT)
Dept: INFUSION THERAPY | Age: 58
Discharge: HOME | End: 2023-10-27
Payer: COMMERCIAL

## 2023-10-27 ENCOUNTER — OFFICE VISIT (OUTPATIENT)
Dept: ONCOLOGY | Age: 58
End: 2023-10-27
Payer: COMMERCIAL

## 2023-10-27 VITALS
HEIGHT: 70 IN | RESPIRATION RATE: 18 BRPM | SYSTOLIC BLOOD PRESSURE: 118 MMHG | BODY MASS INDEX: 35.9 KG/M2 | DIASTOLIC BLOOD PRESSURE: 74 MMHG | TEMPERATURE: 97.7 F | WEIGHT: 250.8 LBS | OXYGEN SATURATION: 86 % | HEART RATE: 74 BPM

## 2023-10-27 DIAGNOSIS — Z72.0 TOBACCO ABUSE DISORDER: ICD-10-CM

## 2023-10-27 DIAGNOSIS — D75.1 POLYCYTHEMIA: ICD-10-CM

## 2023-10-27 DIAGNOSIS — D75.1 POLYCYTHEMIA: Primary | ICD-10-CM

## 2023-10-27 LAB
BASOPHILS ABSOLUTE: 0 K/CU MM
BASOPHILS RELATIVE PERCENT: 0.3 % (ref 0–1)
DIFFERENTIAL TYPE: ABNORMAL
EOSINOPHILS ABSOLUTE: 0.1 K/CU MM
EOSINOPHILS RELATIVE PERCENT: 1.3 % (ref 0–3)
HCT VFR BLD CALC: 58.7 % (ref 42–52)
HEMOGLOBIN: 17.9 GM/DL (ref 13.5–18)
LYMPHOCYTES ABSOLUTE: 1.8 K/CU MM
LYMPHOCYTES RELATIVE PERCENT: 19.5 % (ref 24–44)
MCH RBC QN AUTO: 29.7 PG (ref 27–31)
MCHC RBC AUTO-ENTMCNC: 30.5 % (ref 32–36)
MCV RBC AUTO: 97.3 FL (ref 78–100)
MONOCYTES ABSOLUTE: 0.9 K/CU MM
MONOCYTES RELATIVE PERCENT: 9.4 % (ref 0–4)
PDW BLD-RTO: 14.6 % (ref 11.7–14.9)
PLATELET # BLD: 204 K/CU MM (ref 140–440)
PMV BLD AUTO: 9.4 FL (ref 7.5–11.1)
RBC # BLD: 6.03 M/CU MM (ref 4.6–6.2)
SEGMENTED NEUTROPHILS ABSOLUTE COUNT: 6.5 K/CU MM
SEGMENTED NEUTROPHILS RELATIVE PERCENT: 69.5 % (ref 36–66)
WBC # BLD: 9.4 K/CU MM (ref 4–10.5)

## 2023-10-27 PROCEDURE — 99211 OFF/OP EST MAY X REQ PHY/QHP: CPT

## 2023-10-27 PROCEDURE — 85025 COMPLETE CBC W/AUTO DIFF WBC: CPT

## 2023-10-27 PROCEDURE — 36415 COLL VENOUS BLD VENIPUNCTURE: CPT

## 2023-10-27 PROCEDURE — 3078F DIAST BP <80 MM HG: CPT | Performed by: INTERNAL MEDICINE

## 2023-10-27 PROCEDURE — 3074F SYST BP LT 130 MM HG: CPT | Performed by: INTERNAL MEDICINE

## 2023-10-27 PROCEDURE — 99213 OFFICE O/P EST LOW 20 MIN: CPT | Performed by: INTERNAL MEDICINE

## 2023-10-27 ASSESSMENT — PATIENT HEALTH QUESTIONNAIRE - PHQ9
1. LITTLE INTEREST OR PLEASURE IN DOING THINGS: 0
SUM OF ALL RESPONSES TO PHQ9 QUESTIONS 1 & 2: 0
2. FEELING DOWN, DEPRESSED OR HOPELESS: 0
SUM OF ALL RESPONSES TO PHQ QUESTIONS 1-9: 0

## 2023-10-30 ENCOUNTER — CLINICAL DOCUMENTATION (OUTPATIENT)
Dept: ONCOLOGY | Age: 58
End: 2023-10-30

## 2023-12-04 RX ORDER — TAMSULOSIN HYDROCHLORIDE 0.4 MG/1
CAPSULE ORAL DAILY
Qty: 90 CAPSULE | Refills: 1 | Status: SHIPPED | OUTPATIENT
Start: 2023-12-04

## 2023-12-14 ENCOUNTER — HOSPITAL ENCOUNTER (OUTPATIENT)
Dept: INFUSION THERAPY | Age: 58
Setting detail: INFUSION SERIES
Discharge: HOME | End: 2023-12-14
Payer: COMMERCIAL

## 2023-12-14 VITALS
HEART RATE: 68 BPM | RESPIRATION RATE: 16 BRPM | SYSTOLIC BLOOD PRESSURE: 117 MMHG | TEMPERATURE: 97.3 F | DIASTOLIC BLOOD PRESSURE: 90 MMHG | OXYGEN SATURATION: 93 %

## 2023-12-14 DIAGNOSIS — D75.1 POLYCYTHEMIA: Primary | ICD-10-CM

## 2023-12-14 PROCEDURE — 99195 PHLEBOTOMY: CPT

## 2023-12-14 NOTE — DISCHARGE INSTRUCTIONS
Discharge instructions:     *Do NOT skip meals today   *Drink PLENTY of liquids today, especially water   *Rest today   *Continue your medications as prescribed   *Return to your physician as planned    * If you feel a little lightheaded, lie down for a while,  and have some snacks. * Take your time when standing up from a sitting or  lying position     If you feel a little lightheaded, sit or lie back down     Have a snack or have something to drink    Call your doctor now or seek immediate medical care if:   *You are dizzy or lightheaded or feel like you may faint >1 day   * You have signs of infection, such as: Increased pain, swelling, warmth, or redness    Red streaks leading from the area    Pus draining from the area    A fever         Thank you for choosing [facility].  It is our pleasure to serve you    [facility]  Hours: 8:00-5:00  Phone: [phone]

## 2023-12-14 NOTE — PLAN OF CARE
Ambulatory to unit room 5 for Phlebotomy. Reorientated to unit. Procedure and plan of care explained. Questions answered. Understanding verbalized.

## 2023-12-14 NOTE — PROGRESS NOTES
Diagnosis: polycythemia vera    Pre-Phlebotomy: /96, HR 66    Post-Phlebotomy: /90, HR 68    Volume Removed: 361 grams    Complications: none    Comments: right outer arm      Paras Hernandez RN

## 2023-12-29 ENCOUNTER — HOSPITAL ENCOUNTER (OUTPATIENT)
Dept: INFUSION THERAPY | Age: 58
Discharge: HOME | End: 2023-12-29
Payer: COMMERCIAL

## 2023-12-29 VITALS
SYSTOLIC BLOOD PRESSURE: 133 MMHG | OXYGEN SATURATION: 94 % | HEART RATE: 68 BPM | DIASTOLIC BLOOD PRESSURE: 86 MMHG | RESPIRATION RATE: 16 BRPM | TEMPERATURE: 97.2 F

## 2023-12-29 DIAGNOSIS — D75.1 POLYCYTHEMIA: Primary | ICD-10-CM

## 2023-12-29 PROCEDURE — 99195 PHLEBOTOMY: CPT

## 2023-12-29 NOTE — PROGRESS NOTES
Diagnosis: polycythemia vera    Pre-Phlebotomy: /95, HR 68    Post-Phlebotomy: /86, HR 68    Volume Removed: 050 grams    Complications: none    Comments: left arm vince Lynch RN

## 2024-02-29 ENCOUNTER — TELEPHONE (OUTPATIENT)
Dept: INTERNAL MEDICINE CLINIC | Age: 59
End: 2024-02-29

## 2024-02-29 NOTE — TELEPHONE ENCOUNTER
Pharmacy sent request saying insurance no longer covering the albuterol (Ventolin) and they are requesting an alternative.

## 2024-03-01 RX ORDER — ALBUTEROL SULFATE 90 UG/1
INHALANT RESPIRATORY (INHALATION)
COMMUNITY
End: 2024-03-01 | Stop reason: SDUPTHER

## 2024-03-01 RX ORDER — ALBUTEROL SULFATE 90 UG/1
2 INHALANT RESPIRATORY (INHALATION) EVERY 6 HOURS PRN
Qty: 1 EACH | Refills: 3 | Status: SHIPPED | OUTPATIENT
Start: 2024-03-01

## 2024-03-01 NOTE — TELEPHONE ENCOUNTER
Patient would like it to go to [facility] in [place].  He states that [facility] states that they need a 90 day.

## 2024-03-22 ENCOUNTER — OFFICE VISIT (OUTPATIENT)
Age: 59
End: 2024-03-22
Payer: COMMERCIAL

## 2024-03-22 VITALS
TEMPERATURE: 97 F | BODY MASS INDEX: 36.67 KG/M2 | DIASTOLIC BLOOD PRESSURE: 66 MMHG | HEART RATE: 68 BPM | WEIGHT: 255.6 LBS | SYSTOLIC BLOOD PRESSURE: 118 MMHG | RESPIRATION RATE: 16 BRPM

## 2024-03-22 DIAGNOSIS — G47.33 OSA (OBSTRUCTIVE SLEEP APNEA): ICD-10-CM

## 2024-03-22 DIAGNOSIS — Z12.5 SCREENING PSA (PROSTATE SPECIFIC ANTIGEN): ICD-10-CM

## 2024-03-22 DIAGNOSIS — R91.1 INCIDENTAL LUNG NODULE: ICD-10-CM

## 2024-03-22 DIAGNOSIS — J44.9 COPD, SEVERE (HCC): ICD-10-CM

## 2024-03-22 DIAGNOSIS — I10 ESSENTIAL HYPERTENSION: ICD-10-CM

## 2024-03-22 DIAGNOSIS — E66.01 SEVERE OBESITY (BMI 35.0-39.9) WITH COMORBIDITY (HCC): ICD-10-CM

## 2024-03-22 DIAGNOSIS — E78.2 MIXED HYPERLIPIDEMIA: ICD-10-CM

## 2024-03-22 DIAGNOSIS — Z72.0 TOBACCO ABUSE DISORDER: ICD-10-CM

## 2024-03-22 DIAGNOSIS — I25.10 CORONARY ARTERY DISEASE INVOLVING NATIVE CORONARY ARTERY OF NATIVE HEART WITHOUT ANGINA PECTORIS: Primary | ICD-10-CM

## 2024-03-22 DIAGNOSIS — D75.1 POLYCYTHEMIA: ICD-10-CM

## 2024-03-22 PROCEDURE — 3074F SYST BP LT 130 MM HG: CPT | Performed by: INTERNAL MEDICINE

## 2024-03-22 PROCEDURE — 3078F DIAST BP <80 MM HG: CPT | Performed by: INTERNAL MEDICINE

## 2024-03-22 PROCEDURE — 99214 OFFICE O/P EST MOD 30 MIN: CPT | Performed by: INTERNAL MEDICINE

## 2024-03-22 RX ORDER — AMLODIPINE BESYLATE 5 MG/1
5 TABLET ORAL DAILY
Qty: 90 TABLET | Refills: 1 | Status: SHIPPED | OUTPATIENT
Start: 2024-03-22 | End: 2024-09-18

## 2024-03-22 RX ORDER — FLUTICASONE FUROATE, UMECLIDINIUM BROMIDE AND VILANTEROL TRIFENATATE 100; 62.5; 25 UG/1; UG/1; UG/1
1 POWDER RESPIRATORY (INHALATION) DAILY
Qty: 3 EACH | Refills: 1
Start: 2024-03-22 | End: 2024-09-18

## 2024-03-22 ASSESSMENT — PATIENT HEALTH QUESTIONNAIRE - PHQ9
2. FEELING DOWN, DEPRESSED OR HOPELESS: NOT AT ALL
SUM OF ALL RESPONSES TO PHQ QUESTIONS 1-9: 0
SUM OF ALL RESPONSES TO PHQ9 QUESTIONS 1 & 2: 0
SUM OF ALL RESPONSES TO PHQ QUESTIONS 1-9: 0
SUM OF ALL RESPONSES TO PHQ QUESTIONS 1-9: 0
1. LITTLE INTEREST OR PLEASURE IN DOING THINGS: NOT AT ALL
SUM OF ALL RESPONSES TO PHQ QUESTIONS 1-9: 0

## 2024-03-22 NOTE — PROGRESS NOTES
DAY 90 capsule 1    albuterol sulfate HFA (PROVENTIL;VENTOLIN;PROAIR) 108 (90 Base) MCG/ACT inhaler Inhale 2 puffs into the lungs every 4 hours as needed for Wheezing or Shortness of Breath for wheezing or shortness of breath 17 g 1    amLODIPine (NORVASC) 5 MG tablet Take 1 tablet by mouth daily TAKE 1 TABLET BY MOUTH ONCE A DAY (GENERIC NORVASC) 90 tablet 1    aspirin 81 MG EC tablet Take 1 tablet by mouth daily 90 tablet 1    melatonin 3 MG TABS tablet Take 1 tablet by mouth nightly as needed (sleep)  3     No current facility-administered medications for this visit.     Past Medical History:   Diagnosis Date    Abdominal aortic aneurysm (AAA) without rupture (HCC) 04/18/2018    CT abdomen 4/2018 showed 3.2 cm AAA.  It was normal on CT abdomen in 10/2016 4/8/2019 US : 4.5 x 4.3 cm size : increased significantly. Vascular consultation and recheck in one year. CT abdomen in 5/2019 : 3.3 cm : seen Dr James Malhotra. Recheck in one year     Acute gout of left ankle 02/02/2017    Resolved. Uric acid improved after d/c HCTZ    Bilateral carotid artery disease (HCC) 11/04/2016    10/2016: Left ICA 50-69% and right ICA less than 50%  Dr Malhotra following that. Recheck in 6/17 per pt. But pt states it was cancelled. Pt will call and set appt Carotid doppler : 7/2018 :  bilateral 0-49 %    COPD, severe (HCC) 08/09/2019    PFTs done 6/2019 : severe obstructive disease.     Coronary artery disease involving native coronary artery of native heart without angina pectoris 11/04/2016    CABG x 4 on 9/24/16: Dr Malhotra    COVID 09/2022    H/O colonoscopy 03/30/2015    Dr. Ryan, rtoin 5 yrs  (polypectomy)    H/O Doppler ultrasound 07/19/2018    Carotid Doppler.  Mild (0-49%) disease of bilateral internal carotid arteries, bilateral common carotid artery bulb to proximal ICA exhibits calcific plaque, left distal ICA end diastolic velocity is elevated but ratio is below 2.0, normal vertebral flow.      Hyperlipidemia

## 2024-03-23 ASSESSMENT — ENCOUNTER SYMPTOMS
EYES NEGATIVE: 1
COUGH: 0
RESPIRATORY NEGATIVE: 1
SHORTNESS OF BREATH: 0
ALLERGIC/IMMUNOLOGIC NEGATIVE: 1
WHEEZING: 0

## 2024-05-14 DIAGNOSIS — J44.9 COPD, SEVERE (HCC): ICD-10-CM

## 2024-05-14 RX ORDER — CARVEDILOL 6.25 MG/1
6.25 TABLET ORAL 2 TIMES DAILY
Qty: 180 TABLET | Refills: 1 | Status: SHIPPED | OUTPATIENT
Start: 2024-05-14

## 2024-05-14 RX ORDER — ALBUTEROL SULFATE 90 UG/1
AEROSOL, METERED RESPIRATORY (INHALATION)
Qty: 18 EACH | Refills: 2 | Status: SHIPPED | OUTPATIENT
Start: 2024-05-14

## 2024-05-21 RX ORDER — TAMSULOSIN HYDROCHLORIDE 0.4 MG/1
CAPSULE ORAL DAILY
Qty: 90 CAPSULE | Refills: 1 | Status: SHIPPED | OUTPATIENT
Start: 2024-05-21

## 2024-05-24 ENCOUNTER — HOSPITAL ENCOUNTER (OUTPATIENT)
Age: 59
Discharge: HOME | End: 2024-05-24
Payer: COMMERCIAL

## 2024-05-24 DIAGNOSIS — Z12.5 SCREENING PSA (PROSTATE SPECIFIC ANTIGEN): ICD-10-CM

## 2024-05-24 DIAGNOSIS — E78.2 MIXED HYPERLIPIDEMIA: ICD-10-CM

## 2024-05-24 LAB
ALBUMIN SERPL-MCNC: 3.8 GM/DL (ref 3.4–5)
ALP BLD-CCNC: 80 IU/L (ref 40–129)
ALT SERPL-CCNC: <5 U/L (ref 10–40)
ANION GAP SERPL CALCULATED.3IONS-SCNC: 8 MMOL/L (ref 7–16)
AST SERPL-CCNC: 13 IU/L (ref 15–37)
BILIRUB SERPL-MCNC: 0.8 MG/DL (ref 0–1)
BUN SERPL-MCNC: 13 MG/DL (ref 6–23)
CALCIUM SERPL-MCNC: 9.1 MG/DL (ref 8.3–10.6)
CHLORIDE BLD-SCNC: 102 MMOL/L (ref 99–110)
CHOLESTEROL, FASTING: 121 MG/DL
CO2: 32 MMOL/L (ref 21–32)
CREAT SERPL-MCNC: 0.7 MG/DL (ref 0.9–1.3)
GFR, ESTIMATED: >90 ML/MIN/1.73M2
GLUCOSE SERPL-MCNC: 106 MG/DL (ref 70–99)
HDLC SERPL-MCNC: 44 MG/DL
LDLC SERPL CALC-MCNC: 65 MG/DL
POTASSIUM SERPL-SCNC: 4.9 MMOL/L (ref 3.5–5.1)
PROSTATE SPECIFIC ANTIGEN: 0.57 NG/ML (ref 0–4)
SODIUM BLD-SCNC: 142 MMOL/L (ref 135–145)
TOTAL PROTEIN: 6.5 GM/DL (ref 6.4–8.2)
TRIGLYCERIDE, FASTING: 59 MG/DL

## 2024-05-24 PROCEDURE — 36415 COLL VENOUS BLD VENIPUNCTURE: CPT

## 2024-05-24 PROCEDURE — 80053 COMPREHEN METABOLIC PANEL: CPT

## 2024-05-24 PROCEDURE — G0103 PSA SCREENING: HCPCS

## 2024-05-24 PROCEDURE — 80061 LIPID PANEL: CPT

## 2024-06-13 RX ORDER — FLUTICASONE FUROATE, UMECLIDINIUM BROMIDE AND VILANTEROL TRIFENATATE 100; 62.5; 25 UG/1; UG/1; UG/1
1 POWDER RESPIRATORY (INHALATION) DAILY
Qty: 1 EACH | Refills: 1 | Status: SHIPPED | OUTPATIENT
Start: 2024-06-13 | End: 2024-12-10

## 2024-07-05 ENCOUNTER — OFFICE VISIT (OUTPATIENT)
Age: 59
End: 2024-07-05
Payer: COMMERCIAL

## 2024-07-05 VITALS
HEART RATE: 72 BPM | DIASTOLIC BLOOD PRESSURE: 80 MMHG | HEIGHT: 69 IN | WEIGHT: 249.6 LBS | SYSTOLIC BLOOD PRESSURE: 120 MMHG | BODY MASS INDEX: 36.97 KG/M2 | OXYGEN SATURATION: 96 % | RESPIRATION RATE: 17 BRPM

## 2024-07-05 DIAGNOSIS — G47.33 OSA (OBSTRUCTIVE SLEEP APNEA): ICD-10-CM

## 2024-07-05 DIAGNOSIS — Z72.0 TOBACCO ABUSE DISORDER: ICD-10-CM

## 2024-07-05 DIAGNOSIS — I25.10 CORONARY ARTERY DISEASE INVOLVING NATIVE CORONARY ARTERY OF NATIVE HEART WITHOUT ANGINA PECTORIS: ICD-10-CM

## 2024-07-05 DIAGNOSIS — M25.562 ACUTE PAIN OF LEFT KNEE: Primary | ICD-10-CM

## 2024-07-05 DIAGNOSIS — E78.2 MIXED HYPERLIPIDEMIA: ICD-10-CM

## 2024-07-05 DIAGNOSIS — D75.1 POLYCYTHEMIA: ICD-10-CM

## 2024-07-05 DIAGNOSIS — I71.40 ABDOMINAL AORTIC ANEURYSM (AAA) WITHOUT RUPTURE, UNSPECIFIED PART (HCC): ICD-10-CM

## 2024-07-05 DIAGNOSIS — I10 ESSENTIAL HYPERTENSION: ICD-10-CM

## 2024-07-05 DIAGNOSIS — S98.131A: ICD-10-CM

## 2024-07-05 DIAGNOSIS — J44.9 COPD, SEVERE (HCC): ICD-10-CM

## 2024-07-05 PROCEDURE — 3079F DIAST BP 80-89 MM HG: CPT | Performed by: INTERNAL MEDICINE

## 2024-07-05 PROCEDURE — 3074F SYST BP LT 130 MM HG: CPT | Performed by: INTERNAL MEDICINE

## 2024-07-05 PROCEDURE — 99214 OFFICE O/P EST MOD 30 MIN: CPT | Performed by: INTERNAL MEDICINE

## 2024-07-05 RX ORDER — FLUTICASONE FUROATE, UMECLIDINIUM BROMIDE AND VILANTEROL TRIFENATATE 100; 62.5; 25 UG/1; UG/1; UG/1
1 POWDER RESPIRATORY (INHALATION) DAILY
Qty: 3 EACH | Refills: 1 | Status: SHIPPED | OUTPATIENT
Start: 2024-07-05 | End: 2025-01-01

## 2024-07-05 RX ORDER — PREDNISONE 10 MG/1
TABLET ORAL
Qty: 20 TABLET | Refills: 0 | Status: SHIPPED | OUTPATIENT
Start: 2024-07-05

## 2024-07-05 NOTE — PROGRESS NOTES
seen Dr James Malhotra. Recheck in one year   CT abdomen 2/2021 : 3.5-3.9 cm : recheck in 2 years   1/2023 : 4.4 cm recheck   US abdominal aorta ordered   Assessment & Plan:         Return to office in 3 weeks    Medications were reviewed with the patient. Continue current medications.  Appropriate prescriptions were addressed. After visit geoffy provided.  Follow up as directed : sooner if needed.  Questions were answered and patient verbalized understanding. Call for any problems, questions, or concerns.       Allergies   Allergen Reactions    Guaifenesin Er Diarrhea    Sulfa Antibiotics Rash     Current Outpatient Medications   Medication Sig Dispense Refill    fluticasone-umeclidin-vilant (TRELEGY ELLIPTA) 100-62.5-25 MCG/ACT AEPB inhaler INHALE 1 PUFF INTO THE LUNGS DAILY 1 each 1    tamsulosin (FLOMAX) 0.4 MG capsule TAKE 1 CAPSULE BY MOUTH EVERY DAY 90 capsule 1    albuterol sulfate HFA (PROVENTIL;VENTOLIN;PROAIR) 108 (90 Base) MCG/ACT inhaler INHALE 2 PUFFS INTO LUNGS EVERY 4 HOURS AS NEEDED FOR WHEEZING OR SHORTNESS OF BREATH 18 each 2    carvedilol (COREG) 6.25 MG tablet TAKE 1 TABLET BY MOUTH TWICE A  tablet 1    amLODIPine (NORVASC) 5 MG tablet Take 1 tablet by mouth daily TAKE 1 TABLET BY MOUTH ONCE A DAY (GENERIC NORVASC) 90 tablet 1    Albuterol Sulfate, sensor, (PROAIR DIGIHALER) 108 (90 Base) MCG/ACT AEPB Inhale 2 inhalations into the lungs every 6 hours as needed (Wheezing and shortness of breath) 1 each 3    atorvastatin (LIPITOR) 40 MG tablet Take 1 tablet by mouth daily 90 tablet 1    aspirin 81 MG EC tablet Take 1 tablet by mouth daily 90 tablet 1    melatonin 3 MG TABS tablet Take 1 tablet by mouth nightly as needed (sleep)  3     No current facility-administered medications for this visit.     Past Medical History:   Diagnosis Date    Abdominal aortic aneurysm (AAA) without rupture (HCC) 04/18/2018    CT abdomen 4/2018 showed 3.2 cm AAA.  It was normal on CT abdomen in 10/2016 4/8/2019

## 2024-07-06 LAB — URATE SERPL-MCNC: 6.9 MG/DL (ref 3.5–7.2)

## 2024-08-09 RX ORDER — ATORVASTATIN CALCIUM 40 MG/1
40 TABLET, FILM COATED ORAL DAILY
Qty: 60 TABLET | Refills: 0 | Status: SHIPPED | OUTPATIENT
Start: 2024-08-09 | End: 2025-02-05

## 2024-08-09 NOTE — TELEPHONE ENCOUNTER
Pt cancelled last follow-up, attempted to schedule follow-up, pt refused to schedule.  Please advise

## 2024-09-21 DIAGNOSIS — I10 ESSENTIAL HYPERTENSION: ICD-10-CM

## 2024-09-23 RX ORDER — AMLODIPINE BESYLATE 5 MG/1
5 TABLET ORAL DAILY
Qty: 90 TABLET | Refills: 1 | Status: SHIPPED | OUTPATIENT
Start: 2024-09-23

## 2024-09-27 RX ORDER — TAMSULOSIN HYDROCHLORIDE 0.4 MG/1
CAPSULE ORAL DAILY
Qty: 90 CAPSULE | Refills: 1 | Status: SHIPPED | OUTPATIENT
Start: 2024-09-27

## 2024-09-30 ENCOUNTER — OFFICE VISIT (OUTPATIENT)
Age: 59
End: 2024-09-30
Payer: COMMERCIAL

## 2024-09-30 VITALS
HEART RATE: 76 BPM | BODY MASS INDEX: 37.7 KG/M2 | RESPIRATION RATE: 16 BRPM | WEIGHT: 255.3 LBS | DIASTOLIC BLOOD PRESSURE: 62 MMHG | SYSTOLIC BLOOD PRESSURE: 108 MMHG | TEMPERATURE: 98.4 F | OXYGEN SATURATION: 92 %

## 2024-09-30 DIAGNOSIS — E78.2 MIXED HYPERLIPIDEMIA: ICD-10-CM

## 2024-09-30 DIAGNOSIS — J44.9 COPD, SEVERE (HCC): ICD-10-CM

## 2024-09-30 DIAGNOSIS — Z72.0 TOBACCO ABUSE DISORDER: ICD-10-CM

## 2024-09-30 DIAGNOSIS — I25.10 CORONARY ARTERY DISEASE INVOLVING NATIVE CORONARY ARTERY OF NATIVE HEART WITHOUT ANGINA PECTORIS: ICD-10-CM

## 2024-09-30 DIAGNOSIS — D75.1 POLYCYTHEMIA: ICD-10-CM

## 2024-09-30 DIAGNOSIS — I10 ESSENTIAL HYPERTENSION: Primary | ICD-10-CM

## 2024-09-30 DIAGNOSIS — G47.33 OSA (OBSTRUCTIVE SLEEP APNEA): ICD-10-CM

## 2024-09-30 PROBLEM — M25.562 LEFT KNEE PAIN: Status: RESOLVED | Noted: 2024-07-05 | Resolved: 2024-09-30

## 2024-09-30 PROCEDURE — 3078F DIAST BP <80 MM HG: CPT | Performed by: INTERNAL MEDICINE

## 2024-09-30 PROCEDURE — 99214 OFFICE O/P EST MOD 30 MIN: CPT | Performed by: INTERNAL MEDICINE

## 2024-09-30 PROCEDURE — 3074F SYST BP LT 130 MM HG: CPT | Performed by: INTERNAL MEDICINE

## 2024-09-30 RX ORDER — ATORVASTATIN CALCIUM 40 MG/1
40 TABLET, FILM COATED ORAL DAILY
Qty: 90 TABLET | Refills: 1 | Status: SHIPPED | OUTPATIENT
Start: 2024-09-30 | End: 2025-03-29

## 2024-09-30 SDOH — ECONOMIC STABILITY: FOOD INSECURITY: WITHIN THE PAST 12 MONTHS, THE FOOD YOU BOUGHT JUST DIDN'T LAST AND YOU DIDN'T HAVE MONEY TO GET MORE.: NEVER TRUE

## 2024-09-30 SDOH — ECONOMIC STABILITY: FOOD INSECURITY: WITHIN THE PAST 12 MONTHS, YOU WORRIED THAT YOUR FOOD WOULD RUN OUT BEFORE YOU GOT MONEY TO BUY MORE.: NEVER TRUE

## 2024-09-30 SDOH — ECONOMIC STABILITY: INCOME INSECURITY: HOW HARD IS IT FOR YOU TO PAY FOR THE VERY BASICS LIKE FOOD, HOUSING, MEDICAL CARE, AND HEATING?: NOT HARD AT ALL

## 2024-09-30 ASSESSMENT — PATIENT HEALTH QUESTIONNAIRE - PHQ9
2. FEELING DOWN, DEPRESSED OR HOPELESS: SEVERAL DAYS
SUM OF ALL RESPONSES TO PHQ QUESTIONS 1-9: 1
SUM OF ALL RESPONSES TO PHQ QUESTIONS 1-9: 1
1. LITTLE INTEREST OR PLEASURE IN DOING THINGS: NOT AT ALL
SUM OF ALL RESPONSES TO PHQ QUESTIONS 1-9: 1
SUM OF ALL RESPONSES TO PHQ QUESTIONS 1-9: 1
SUM OF ALL RESPONSES TO PHQ9 QUESTIONS 1 & 2: 1

## 2024-09-30 NOTE — PROGRESS NOTES
Suleman Mon  Patient's  is 1965  Seen in office on 2024      SUBJECTIVE:  Suleman stark 59 y.o.year old male presents today   Chief Complaint   Patient presents with    Follow-up    Medication Refill     Feels good  Pain in left knee resolved with prednisone   He did not get xrays done and and is feeling back to normal  Tail bone also resolved.    Patient has hyperlipidemia. Taking medications. No abdominal pain, no nausea or vomiting. No myalgias.    Patient has hypertension. Taking medications No headaches, no chest pain, no palpitations and no dizziness.    Has CAD. Feels well no angina    COPD stable. Using albuterol inhalers prn 4 times in a week  Using trelegy every day.    Sleep apnea Uses CPAP sometimes.  He was not using it in the past.  He is thinking of using a different kind of CPAP but he needs to contact Dr Roman Mejia for that. H did not contact him      Patient has BPH and is using Flomax and is benefiting from it     Patient has polycythemia and sees Dr. Lewis for that.  He has few phlebotomies done.  Dr. Lewis monitoring H&H. He had appt on 24  But he canceled it and has not made new appt.    Taking medications regularly. No side effects noted.    Review of Systems  Review of system normal except as in HPI  OBJECTIVE: /62   Pulse 76   Temp 98.4 °F (36.9 °C) (Oral)   Resp 16   Wt 115.8 kg (255 lb 4.8 oz)   SpO2 92%   BMI 37.70 kg/m²     Wt Readings from Last 3 Encounters:   24 115.8 kg (255 lb 4.8 oz)   24 113.2 kg (249 lb 9.6 oz)   24 115.9 kg (255 lb 9.6 oz)      GENERAL: - Alert, oriented, pleasant, in no apparent distress.    HEENT: - Conjunctiva pink, no scleral icterus. ENT clear.  NECK: -Supple.  No jugular venous distention noted. No masses felt,  CARDIOVASCULAR: - Normal S1 and S2    PULMONARY: - No respiratory distress.  No wheezes or rales.    ABDOMEN: - Soft and non-tender,no masses  ororganomegaly.  EXTREMITIES: - No cyanosis, clubbing, or

## 2024-12-23 RX ORDER — CARVEDILOL 6.25 MG/1
6.25 TABLET ORAL 2 TIMES DAILY
Qty: 180 TABLET | Refills: 1 | Status: SHIPPED | OUTPATIENT
Start: 2024-12-23

## 2024-12-27 ENCOUNTER — HOSPITAL ENCOUNTER (OUTPATIENT)
Age: 59
Discharge: HOME | End: 2024-12-27
Payer: COMMERCIAL

## 2024-12-27 DIAGNOSIS — E78.2 MIXED HYPERLIPIDEMIA: ICD-10-CM

## 2024-12-27 DIAGNOSIS — I10 ESSENTIAL HYPERTENSION: ICD-10-CM

## 2024-12-27 LAB
ALBUMIN SERPL-MCNC: 4 G/DL (ref 3.4–5)
ALBUMIN/GLOB SERPL: 1.5 {RATIO} (ref 1.1–2.2)
ALP SERPL-CCNC: 70 U/L (ref 40–129)
ALT SERPL-CCNC: 21 U/L (ref 10–40)
ANION GAP SERPL CALCULATED.3IONS-SCNC: 9 MMOL/L (ref 4–16)
AST SERPL-CCNC: 18 U/L (ref 15–37)
BILIRUB SERPL-MCNC: 0.6 MG/DL (ref 0–1)
BUN SERPL-MCNC: 15 MG/DL (ref 6–23)
CALCIUM SERPL-MCNC: 9.6 MG/DL (ref 8.3–10.6)
CHLORIDE SERPL-SCNC: 99 MMOL/L (ref 99–110)
CHOLEST SERPL-MCNC: 155 MG/DL (ref 125–199)
CO2 SERPL-SCNC: 32 MMOL/L (ref 21–32)
CREAT SERPL-MCNC: 0.6 MG/DL (ref 0.9–1.3)
GFR, ESTIMATED: >90 ML/MIN/1.73M2
GLUCOSE SERPL-MCNC: 117 MG/DL (ref 70–99)
HDLC SERPL-MCNC: 48 MG/DL
LDLC SERPL CALC-MCNC: 79 MG/DL
POTASSIUM SERPL-SCNC: 4.2 MMOL/L (ref 3.5–5.1)
PROT SERPL-MCNC: 6.6 G/DL (ref 6.4–8.2)
SODIUM SERPL-SCNC: 140 MMOL/L (ref 135–145)
TRIGL SERPL-MCNC: 140 MG/DL

## 2024-12-27 PROCEDURE — 36415 COLL VENOUS BLD VENIPUNCTURE: CPT

## 2024-12-27 PROCEDURE — 80053 COMPREHEN METABOLIC PANEL: CPT

## 2024-12-27 PROCEDURE — 80061 LIPID PANEL: CPT

## 2025-01-03 ENCOUNTER — OFFICE VISIT (OUTPATIENT)
Age: 60
End: 2025-01-03
Payer: COMMERCIAL

## 2025-01-03 VITALS
HEART RATE: 72 BPM | WEIGHT: 257.8 LBS | SYSTOLIC BLOOD PRESSURE: 118 MMHG | DIASTOLIC BLOOD PRESSURE: 70 MMHG | TEMPERATURE: 97.9 F | RESPIRATION RATE: 16 BRPM | BODY MASS INDEX: 38.07 KG/M2 | OXYGEN SATURATION: 92 %

## 2025-01-03 DIAGNOSIS — R73.9 BLOOD GLUCOSE ELEVATED: ICD-10-CM

## 2025-01-03 DIAGNOSIS — G47.33 OSA (OBSTRUCTIVE SLEEP APNEA): ICD-10-CM

## 2025-01-03 DIAGNOSIS — J44.9 COPD, SEVERE (HCC): Primary | ICD-10-CM

## 2025-01-03 DIAGNOSIS — S98.131A: ICD-10-CM

## 2025-01-03 DIAGNOSIS — Z72.0 TOBACCO ABUSE DISORDER: ICD-10-CM

## 2025-01-03 DIAGNOSIS — I10 ESSENTIAL HYPERTENSION: ICD-10-CM

## 2025-01-03 DIAGNOSIS — I25.10 CORONARY ARTERY DISEASE INVOLVING NATIVE CORONARY ARTERY OF NATIVE HEART WITHOUT ANGINA PECTORIS: ICD-10-CM

## 2025-01-03 DIAGNOSIS — I71.40 ABDOMINAL AORTIC ANEURYSM (AAA) WITHOUT RUPTURE, UNSPECIFIED PART (HCC): ICD-10-CM

## 2025-01-03 DIAGNOSIS — E78.2 MIXED HYPERLIPIDEMIA: ICD-10-CM

## 2025-01-03 DIAGNOSIS — N40.0 BENIGN PROSTATIC HYPERPLASIA WITHOUT LOWER URINARY TRACT SYMPTOMS: ICD-10-CM

## 2025-01-03 DIAGNOSIS — D75.1 POLYCYTHEMIA: ICD-10-CM

## 2025-01-03 LAB — HBA1C MFR BLD: 6.1 %

## 2025-01-03 PROCEDURE — 83036 HEMOGLOBIN GLYCOSYLATED A1C: CPT | Performed by: INTERNAL MEDICINE

## 2025-01-03 RX ORDER — FLUTICASONE FUROATE, UMECLIDINIUM BROMIDE AND VILANTEROL TRIFENATATE 100; 62.5; 25 UG/1; UG/1; UG/1
1 POWDER RESPIRATORY (INHALATION) DAILY
COMMUNITY
Start: 2024-11-10

## 2025-01-03 ASSESSMENT — ENCOUNTER SYMPTOMS
GASTROINTESTINAL NEGATIVE: 1
WHEEZING: 0
SHORTNESS OF BREATH: 0
ALLERGIC/IMMUNOLOGIC NEGATIVE: 1
EYES NEGATIVE: 1
COUGH: 0
STRIDOR: 0

## 2025-01-03 NOTE — PROGRESS NOTES
Vaccine Information Sheet, \"Influenza - Inactivated\"  given to Suleman Mon, or parent/legal guardian of  Suleman Mon and verbalized understanding.    Patient responses:    Have you ever had a reaction to a flu vaccine? No  Do you have any current illness?  No  Have you ever had Guillian Kipling Syndrome?  No  Do you have a serious allergy to any of the follow: Neomycin, Polymyxin, Thimerosal, eggs or egg products? No    Flu vaccine given per order. Please see immunization tab.    Risks and benefits explained.  Current VIS given.         
right, initial encounter ([facility])     Abdominal aortic aneurysm (AAA) without rupture, unspecified part ([facility])        ASSESSMENT/PLAN:    Assessment & Plan  1. Hypertension.  His blood pressure is well-regulated with the current regimen of amlodipine and carvedilol. He should continue his current medications.    2. Hyperlipidemia.  His cholesterol levels are within the normal range, indicating effective management with atorvastatin 40 mg daily. He should continue his current medication.    3. Polycythemia.  His hemoglobin was 7.9 and hematocrit was 58 in October 2023. He is advised to consult with Dr. Nieves for further evaluation and management of his polycythemia.    4. Nicotine dependence.  He is encouraged to make another attempt at smoking cessation.    5. Coronary artery disease.  He has a history of bypass surgery. A referral to Dr. Huertas will be initiated for further evaluation and management of his coronary artery disease.    6. Chronic obstructive pulmonary disease.  He uses Trelegy and albuterol sparingly for his severe COPD. He should continue using these inhalers as needed.    7. Abdominal aortic aneurysm.  An ultrasound will be ordered to monitor the size of his abdominal aortic aneurysm, which was last measured at 4.4 cm.    8. Benign prostatic hyperplasia.  He takes Flomax once a day for his enlarged prostate. He should continue this medication.    9. Sleep apnea.  He uses oxygen at night instead of a CPAP mask. No changes to his current management plan are necessary.    10. Health maintenance.  His blood glucose levels have been consistently elevated, with a recent reading of 117. Liver and kidney function tests are within normal limits. An A1c test will be conducted to assess his risk of developing diabetes. He is due for shingles, pneumonia, RSV, COVID-19, and influenza vaccines. The influenza vaccine will be administered during this visit.    PROCEDURE  The patient underwent bypass surgery in the

## 2025-01-13 ENCOUNTER — HOSPITAL ENCOUNTER (OUTPATIENT)
Dept: ULTRASOUND IMAGING | Age: 60
Discharge: HOME | End: 2025-01-13
Payer: COMMERCIAL

## 2025-01-13 ENCOUNTER — INITIAL CONSULT (OUTPATIENT)
Dept: CARDIOLOGY CLINIC | Age: 60
End: 2025-01-13
Payer: COMMERCIAL

## 2025-01-13 VITALS
BODY MASS INDEX: 36.79 KG/M2 | HEIGHT: 70 IN | WEIGHT: 257 LBS | SYSTOLIC BLOOD PRESSURE: 128 MMHG | DIASTOLIC BLOOD PRESSURE: 80 MMHG | HEART RATE: 64 BPM

## 2025-01-13 DIAGNOSIS — I71.40 ABDOMINAL AORTIC ANEURYSM (AAA) WITHOUT RUPTURE, UNSPECIFIED PART (HCC): ICD-10-CM

## 2025-01-13 DIAGNOSIS — E78.2 MIXED HYPERLIPIDEMIA: ICD-10-CM

## 2025-01-13 DIAGNOSIS — R06.02 SHORTNESS OF BREATH: ICD-10-CM

## 2025-01-13 DIAGNOSIS — I77.9 BILATERAL CAROTID ARTERY DISEASE, UNSPECIFIED TYPE (HCC): ICD-10-CM

## 2025-01-13 DIAGNOSIS — E66.9 OBESITY (BMI 30-39.9): ICD-10-CM

## 2025-01-13 DIAGNOSIS — I25.10 CORONARY ARTERY DISEASE INVOLVING NATIVE CORONARY ARTERY OF NATIVE HEART WITHOUT ANGINA PECTORIS: Primary | ICD-10-CM

## 2025-01-13 DIAGNOSIS — I10 ESSENTIAL HYPERTENSION: ICD-10-CM

## 2025-01-13 DIAGNOSIS — Z72.0 TOBACCO ABUSE DISORDER: ICD-10-CM

## 2025-01-13 PROCEDURE — 3074F SYST BP LT 130 MM HG: CPT | Performed by: INTERNAL MEDICINE

## 2025-01-13 PROCEDURE — 3079F DIAST BP 80-89 MM HG: CPT | Performed by: INTERNAL MEDICINE

## 2025-01-13 PROCEDURE — 93978 VASCULAR STUDY: CPT

## 2025-01-13 PROCEDURE — 93000 ELECTROCARDIOGRAM COMPLETE: CPT | Performed by: INTERNAL MEDICINE

## 2025-01-13 PROCEDURE — 99204 OFFICE O/P NEW MOD 45 MIN: CPT | Performed by: INTERNAL MEDICINE

## 2025-01-13 RX ORDER — ATORVASTATIN CALCIUM 80 MG/1
80 TABLET, FILM COATED ORAL DAILY
Qty: 90 TABLET | Refills: 1 | Status: SHIPPED | OUTPATIENT
Start: 2025-01-13 | End: 2025-07-12

## 2025-01-13 NOTE — ASSESSMENT & PLAN NOTE
Patient has gained weight and we have counseled him extensively for calorie restrictions and regular exercise program to lose weight.

## 2025-01-13 NOTE — PATIENT INSTRUCTIONS
loss.     Multiple questions answered. Patient verbalizes understanding and asks relevant questions. Follow up in 6 weeks with Lexiscan stress Cardiolite, sooner if needed.

## 2025-01-13 NOTE — ASSESSMENT & PLAN NOTE
Patient is asymptomatic.  2018 study did not reveal any significant disease of carotid.  Continue aggressive risk factor modification for primary prevention.

## 2025-01-13 NOTE — PROGRESS NOTES
CARDIOLOGY CONSULTATION    Suleman Mon  1965    Dear Dena Mathis MD    Thank you for asking me to participate in care of Suleman Mon    Chief Complaint   Patient presents with    Consultation     Pt is here to get reestablish with cardiology. Pt has a hx of coronary artery disease and aortic aneurysm w/o rupture. Pt states feet edema here and there. Pt denies any other cardiac sx. Pt smokes ppd. Pt drinks 6 beers weekly, or 6 shots weekly. Pt drinks 1 cup coffee daily.      History of present illness:  Suleman Mon is a 59 y.o. male is seeing me for the first time for reestablish cardiac follow-up for known coronary artery disease.  Patient has seen Dr. Huertas last seen was in 2018.  He suffered an NSTEMI in October 2016.  Cath revealed 100% occluded RCA, 90% LAD and circumflex disease.  He had bypass surgery 9/24/16 .  (LIMA to LAd and D1,SVG to PDA,svg to ramus).  Has been following up with vascular surgery for abdominal aortic aneurysm.  He had quit smoking for 1 year after bypass surgery and then he picked it back up and now is smoking 1 pack of cigarettes daily.  He reports some dyspnea on exertion has gained weight but denies any chest pains or arm pain or jaw pain like he had when he had heart attack in 2016.  He has been compliant to his medication has been following up with primary care physician regularly.  For some reason he was not happy with his last cardiology follow-up in [place] and he wanted to be followed locally in [place].    REVIEW OF SYSTEMS:   Constitutional: Denies generalized weakness  Eyes:  Denies change in visual acuity  HENT:  Denies nasal congestion or sore throat  Respiratory:  Denies cough or shortness of breath  Cardiovascular: as in HPI  GI:  Denies abdominal pain, complains of nausea and vomiting  :  Denies dysuria  Musculoskeletal:  Denies back pain or joint pain  Integument:  Denies rash  Neurologic:  Denies headache, focal weakness or sensory

## 2025-01-13 NOTE — ASSESSMENT & PLAN NOTE
Patient has dyspnea on exertion he has no chest pains.  Had not had any object evaluation since 2017.  We will obtain a Lexiscan stress Cardiolite as he is not able to walk on a treadmill has history of toe amputations and reports dyspnea on exertion and is on beta-blocker therapy.

## 2025-01-13 NOTE — ASSESSMENT & PLAN NOTE
Recent abdominal ultrasound results are reviewed and compared with multiple previous results.  Since [facility] have closed their office I would refer him to Dr. Abarca to follow-up with him for abdominal aortic aneurysm.

## 2025-01-13 NOTE — ASSESSMENT & PLAN NOTE
His compliance to statin therapy has been somewhat questionable based on the results.  His recent LDL was 79 on atorvastatin 40 mg daily.  I would recommend increasing atorvastatin to 80 mg daily if tolerated and LDL goal is less than 55 if possible considering his premature coronary artery disease.

## 2025-01-13 NOTE — ASSESSMENT & PLAN NOTE
Blood pressure is fairly well-controlled on combination of amlodipine and carvedilol continue both.

## 2025-01-15 ENCOUNTER — TELEPHONE (OUTPATIENT)
Dept: CARDIOTHORACIC SURGERY | Age: 60
End: 2025-01-15

## 2025-01-15 NOTE — TELEPHONE ENCOUNTER
Received a referral on 1- fro Dr. Cote for Abdominal aortic aneurysm (AAA) without rupture.     Left message for patient to call the office to schedule appt.

## 2025-01-21 ENCOUNTER — TELEPHONE (OUTPATIENT)
Dept: CARDIOLOGY CLINIC | Age: 60
End: 2025-01-21

## 2025-01-21 NOTE — TELEPHONE ENCOUNTER
LMx1      Nuclear lexiscan stress test with myocardial perfusion     Stress Test: A pharmacological stress test was performed using regadenoson (Lexiscan). PO caffeine given as a reversal agent. Hemodynamics are adequate for diagnosis. Blood pressure demonstrated a normal response and heart rate demonstrated a normal response to stress. The patient's heart rate recovery was normal.    Image quality is suboptimal.    Perfusion Comments: There is no evidence of inducible regional wall ischemia.  Nonhomogenous tracer uptake noted throughout the myocardium.    Rest Function: Resting ejection fraction was 55%. Stress end diastolic volume: 98 mL. Stress end systolic volume: 44 mL.

## 2025-01-27 ENCOUNTER — OFFICE VISIT (OUTPATIENT)
Dept: CARDIOLOGY CLINIC | Age: 60
End: 2025-01-27
Payer: COMMERCIAL

## 2025-01-27 VITALS
HEART RATE: 75 BPM | DIASTOLIC BLOOD PRESSURE: 82 MMHG | WEIGHT: 255.8 LBS | SYSTOLIC BLOOD PRESSURE: 122 MMHG | HEIGHT: 70 IN | BODY MASS INDEX: 36.62 KG/M2

## 2025-01-27 DIAGNOSIS — I71.40 ABDOMINAL AORTIC ANEURYSM (AAA) WITHOUT RUPTURE, UNSPECIFIED PART (HCC): Primary | ICD-10-CM

## 2025-01-27 DIAGNOSIS — I10 ESSENTIAL HYPERTENSION: ICD-10-CM

## 2025-01-27 DIAGNOSIS — I25.10 CORONARY ARTERY DISEASE INVOLVING NATIVE CORONARY ARTERY OF NATIVE HEART WITHOUT ANGINA PECTORIS: ICD-10-CM

## 2025-01-27 DIAGNOSIS — E78.2 MIXED HYPERLIPIDEMIA: ICD-10-CM

## 2025-01-27 DIAGNOSIS — Z72.0 TOBACCO ABUSE DISORDER: ICD-10-CM

## 2025-01-27 PROCEDURE — 99213 OFFICE O/P EST LOW 20 MIN: CPT | Performed by: INTERNAL MEDICINE

## 2025-01-27 PROCEDURE — 3074F SYST BP LT 130 MM HG: CPT | Performed by: INTERNAL MEDICINE

## 2025-01-27 PROCEDURE — 3079F DIAST BP 80-89 MM HG: CPT | Performed by: INTERNAL MEDICINE

## 2025-01-27 NOTE — ASSESSMENT & PLAN NOTE
Abdominal ultrasound suggest a growth by 0.5 cm the last 2 years.  Maximal diameter is 4.5.  Will have vascular surgeon Dr. Abarca to see him in follow-up on this.

## 2025-01-27 NOTE — ASSESSMENT & PLAN NOTE
He is tolerating the increased dose of atorvastatin 80 mg daily and will have a lab repeating lipids done next month.  Goal is to bring LDL down below 55 if possible.

## 2025-01-27 NOTE — ASSESSMENT & PLAN NOTE
Nuclear stress test is negative for any significant regional ischemia continue aggressive risk factor modification for secondary prevention.

## 2025-01-27 NOTE — PATIENT INSTRUCTIONS
Appropriate prescriptions if needed on this visit are addressed. After visit summery is provided.   Questions answered and patient verbalizes understanding. Follow up in 5 months with repeat lipids,  sooner if needed.

## 2025-01-27 NOTE — PROGRESS NOTES
Suleman Mon  1965  Dena Cote MD      Chief Complaint   Patient presents with    Coronary Artery Disease    Hypertension    Hyperlipidemia    Results    Follow-up     Follow up with test results  No chest pain, SOB dizziness, or palpitations  Complains of left leg swelling      Chief complaint and HPI:  Suleman Mon  is a 59 y.o. male following up for tests he had including abdominal ultrasound and nuclear stress test.  Denies any new cardiac symptoms.  He continues to smoke 1 pack of cigarettes daily.  He has not lost much weight.  Medications reviewed and reconciled.    Rest of the Cardiovascular system review is otherwise unchanged from prior encounter.  Past medical history:  has a past medical history of Abdominal aortic aneurysm (AAA) without rupture (HCC), Acute gout of left ankle, Bilateral carotid artery disease (HCC), COPD, severe (HCC), Coronary artery disease involving native coronary artery of native heart without angina pectoris, COVID, H/O colonoscopy, H/O Doppler ultrasound, Hyperlipidemia, Hypertension, Incidental lung nodule 4 mm RUL. f/u in 10/15, Kidney stone on right side, Nephrolithiasis, JOSE MANUEL (obstructive sleep apnea), Polycythemia, Right shoulder pain, S/P split thickness skin graft, Squamous papilloma, and Tobacco abuse disorder.  Past surgical history:  has a past surgical history that includes Kidney stone surgery; hernia repair; Colonoscopy (03/2015); Cardiac surgery (10/24/2016); and Colonoscopy.  Social History:   Social History     Tobacco Use    Smoking status: Every Day     Current packs/day: 1.00     Average packs/day: 1 pack/day for 42.1 years (42.1 ttl pk-yrs)     Types: Cigarettes     Start date: 1983    Smokeless tobacco: Never   Substance Use Topics    Alcohol use: Yes     Alcohol/week: 6.0 standard drinks of alcohol     Types: 6 Standard drinks or equivalent per week     Comment: 3 or 4 beers per day, most days     Family history: family history includes Cancer

## 2025-02-04 DIAGNOSIS — I71.40 ABDOMINAL AORTIC ANEURYSM (AAA) WITHOUT RUPTURE, UNSPECIFIED PART (HCC): Primary | ICD-10-CM

## 2025-02-10 RX ORDER — FLUTICASONE FUROATE, UMECLIDINIUM BROMIDE AND VILANTEROL TRIFENATATE 100; 62.5; 25 UG/1; UG/1; UG/1
POWDER RESPIRATORY (INHALATION)
Qty: 180 EACH | Refills: 1 | Status: SHIPPED | OUTPATIENT
Start: 2025-02-10

## 2025-02-20 ENCOUNTER — HOSPITAL ENCOUNTER (OUTPATIENT)
Dept: CT IMAGING | Age: 60
Discharge: HOME | End: 2025-02-20
Payer: COMMERCIAL

## 2025-02-20 ENCOUNTER — HOSPITAL ENCOUNTER (OUTPATIENT)
Age: 60
Discharge: HOME | End: 2025-02-20
Payer: COMMERCIAL

## 2025-02-20 DIAGNOSIS — E78.2 MIXED HYPERLIPIDEMIA: ICD-10-CM

## 2025-02-20 DIAGNOSIS — I71.40 ABDOMINAL AORTIC ANEURYSM (AAA) WITHOUT RUPTURE, UNSPECIFIED PART: ICD-10-CM

## 2025-02-20 LAB
ALBUMIN SERPL-MCNC: 3.8 G/DL (ref 3.4–5)
ALBUMIN/GLOB SERPL: 1.4 {RATIO} (ref 1.1–2.2)
ALP SERPL-CCNC: 76 U/L (ref 40–129)
ALT SERPL-CCNC: 15 U/L (ref 10–40)
AST SERPL-CCNC: 15 U/L (ref 15–37)
BILIRUB DIRECT SERPL-MCNC: <0.2 MG/DL (ref 0–0.3)
BILIRUB INDIRECT SERPL-MCNC: NORMAL MG/DL (ref 0–0.7)
BILIRUB SERPL-MCNC: 0.5 MG/DL (ref 0–1)
CHOLEST SERPL-MCNC: 124 MG/DL (ref 125–199)
EGFR, POC: >90 ML/MIN/1.73M2
GLOBULIN SER CALC-MCNC: 2.7 G/DL (ref 1.3–4.9)
HDLC SERPL-MCNC: 44 MG/DL
LDLC SERPL CALC-MCNC: 71 MG/DL
POC CREATININE: 0.7 MG/DL (ref 0.5–1.2)
PROT SERPL-MCNC: 6.5 G/DL (ref 6.4–8.2)
TRIGL SERPL-MCNC: 46 MG/DL

## 2025-02-20 PROCEDURE — 80061 LIPID PANEL: CPT

## 2025-02-20 PROCEDURE — 36415 COLL VENOUS BLD VENIPUNCTURE: CPT

## 2025-02-20 PROCEDURE — 80076 HEPATIC FUNCTION PANEL: CPT

## 2025-02-20 PROCEDURE — 6360000004 HC RX CONTRAST MEDICATION: Performed by: PHYSICIAN ASSISTANT

## 2025-02-20 PROCEDURE — 82565 ASSAY OF CREATININE: CPT

## 2025-02-20 PROCEDURE — 74174 CTA ABD&PLVS W/CONTRAST: CPT

## 2025-02-20 RX ORDER — IOPAMIDOL 755 MG/ML
100 INJECTION, SOLUTION INTRAVASCULAR
Status: COMPLETED | OUTPATIENT
Start: 2025-02-20 | End: 2025-02-20

## 2025-02-20 RX ADMIN — IOPAMIDOL 100 ML: 755 INJECTION, SOLUTION INTRAVENOUS at 08:37

## 2025-02-26 NOTE — PROGRESS NOTES
2025    Dena Cote MD   [address]  [facility] [address]    Leo Verma MD         Re: Mr.Bogard Suleman    Dear Dr. Cote  Dear Dr. Verma    I had the pleasure of seeing your patient Suleman Mon (59 y.o. male) today in office for a consultation regarding his abdominal aortic aneurysm.  He is a very pleasant 59-year-old gentleman with a history of hypertension, hyperlipidemia, coronary artery disease, s/p CABG in 2016 and polycythemia who has known that he has an abdominal aortic aneurysm for about 10 years.  He comes to us today because his most recent CT scan showed that there has been enlargement of his aortic aneurysm.  He has no abdominal or back pain, but he has a history of kidney stones and recently had an episode.  A detailed family history was obtained.  His father had a abdominal aortic aneurysm which was never treated.  He  of lung cancer and was an active smoker and had no complications related to his abdominal aortic aneurysm.  There is no other family history.  He has 1 daughter who is in her mid 30s who has not been screened for aneurysms.      Past Medical History:  Past Medical History:   Diagnosis Date    Abdominal aortic aneurysm (AAA) without rupture 2018    CT abdomen 2018 showed 3.2 cm AAA.  It was normal on CT abdomen in 10/2016 2019 US : 4.5 x 4.3 cm size : increased significantly. Vascular consultation and recheck in one year. CT abdomen in 2019 : 3.3 cm : seen Dr James Malhotra. Recheck in one year     Acute gout of left ankle 2017    Resolved. Uric acid improved after d/c HCTZ    Bilateral carotid artery disease 2016    10/2016: Left ICA 50-69% and right ICA less than 50%  Dr Malhotra following that. Recheck in  per pt. But pt states it was cancelled. Pt will call and set appt Carotid doppler : 2018 :  bilateral 0-49 %    COPD, severe (HCC) 2019    PFTs done 2019 : severe obstructive disease.     Coronary artery disease

## 2025-02-27 ENCOUNTER — OFFICE VISIT (OUTPATIENT)
Dept: CARDIOTHORACIC SURGERY | Age: 60
End: 2025-02-27
Payer: COMMERCIAL

## 2025-02-27 VITALS
SYSTOLIC BLOOD PRESSURE: 105 MMHG | HEIGHT: 70 IN | DIASTOLIC BLOOD PRESSURE: 79 MMHG | OXYGEN SATURATION: 93 % | BODY MASS INDEX: 36.16 KG/M2 | WEIGHT: 252.6 LBS | HEART RATE: 72 BPM

## 2025-02-27 DIAGNOSIS — I71.40 ABDOMINAL AORTIC ANEURYSM (AAA) WITHOUT RUPTURE, UNSPECIFIED PART: Primary | ICD-10-CM

## 2025-02-27 PROCEDURE — 3078F DIAST BP <80 MM HG: CPT | Performed by: THORACIC SURGERY (CARDIOTHORACIC VASCULAR SURGERY)

## 2025-02-27 PROCEDURE — 99205 OFFICE O/P NEW HI 60 MIN: CPT | Performed by: THORACIC SURGERY (CARDIOTHORACIC VASCULAR SURGERY)

## 2025-02-27 PROCEDURE — 3074F SYST BP LT 130 MM HG: CPT | Performed by: THORACIC SURGERY (CARDIOTHORACIC VASCULAR SURGERY)

## 2025-03-12 DIAGNOSIS — I10 ESSENTIAL HYPERTENSION: ICD-10-CM

## 2025-03-12 RX ORDER — AMLODIPINE BESYLATE 5 MG/1
5 TABLET ORAL DAILY
Qty: 90 TABLET | Refills: 1 | Status: SHIPPED | OUTPATIENT
Start: 2025-03-12

## 2025-04-10 ENCOUNTER — OFFICE VISIT (OUTPATIENT)
Age: 60
End: 2025-04-10
Payer: COMMERCIAL

## 2025-04-10 VITALS
HEART RATE: 68 BPM | BODY MASS INDEX: 35.16 KG/M2 | SYSTOLIC BLOOD PRESSURE: 124 MMHG | OXYGEN SATURATION: 97 % | RESPIRATION RATE: 16 BRPM | DIASTOLIC BLOOD PRESSURE: 72 MMHG | WEIGHT: 245.6 LBS | TEMPERATURE: 97.8 F

## 2025-04-10 DIAGNOSIS — J44.9 COPD, SEVERE (HCC): ICD-10-CM

## 2025-04-10 DIAGNOSIS — N40.0 BENIGN PROSTATIC HYPERPLASIA WITHOUT LOWER URINARY TRACT SYMPTOMS: ICD-10-CM

## 2025-04-10 DIAGNOSIS — G47.33 OSA (OBSTRUCTIVE SLEEP APNEA): ICD-10-CM

## 2025-04-10 DIAGNOSIS — D75.1 POLYCYTHEMIA: ICD-10-CM

## 2025-04-10 DIAGNOSIS — I25.10 CORONARY ARTERY DISEASE INVOLVING NATIVE CORONARY ARTERY OF NATIVE HEART WITHOUT ANGINA PECTORIS: ICD-10-CM

## 2025-04-10 DIAGNOSIS — I10 ESSENTIAL HYPERTENSION: Primary | ICD-10-CM

## 2025-04-10 DIAGNOSIS — I65.23 BILATERAL CAROTID ARTERY STENOSIS: ICD-10-CM

## 2025-04-10 DIAGNOSIS — R91.1 INCIDENTAL LUNG NODULE: ICD-10-CM

## 2025-04-10 DIAGNOSIS — I71.43 INFRARENAL ABDOMINAL AORTIC ANEURYSM (AAA) WITHOUT RUPTURE: ICD-10-CM

## 2025-04-10 DIAGNOSIS — Z72.0 TOBACCO ABUSE DISORDER: ICD-10-CM

## 2025-04-10 DIAGNOSIS — E78.2 MIXED HYPERLIPIDEMIA: ICD-10-CM

## 2025-04-10 PROCEDURE — 99214 OFFICE O/P EST MOD 30 MIN: CPT | Performed by: INTERNAL MEDICINE

## 2025-04-10 PROCEDURE — 3074F SYST BP LT 130 MM HG: CPT | Performed by: INTERNAL MEDICINE

## 2025-04-10 PROCEDURE — 3078F DIAST BP <80 MM HG: CPT | Performed by: INTERNAL MEDICINE

## 2025-04-10 ASSESSMENT — PATIENT HEALTH QUESTIONNAIRE - PHQ9
SUM OF ALL RESPONSES TO PHQ QUESTIONS 1-9: 0
1. LITTLE INTEREST OR PLEASURE IN DOING THINGS: NOT AT ALL
SUM OF ALL RESPONSES TO PHQ QUESTIONS 1-9: 0
2. FEELING DOWN, DEPRESSED OR HOPELESS: NOT AT ALL

## 2025-04-10 NOTE — PROGRESS NOTES
stones.    No orders of the defined types were placed in this encounter.    Return to office in 3 months.    Medications were reviewed with the patient. Continue current medications.  Appropriate prescriptions were addressed. After visit bushra provided.  Follow up as directed : sooner if needed.  Questions were answered and patient verbalized understanding. Call for any problems, questions, or concerns.       Allergies   Allergen Reactions    Guaifenesin Er Diarrhea    Sulfa Antibiotics Rash     Current Outpatient Medications   Medication Sig Dispense Refill    amLODIPine (NORVASC) 5 MG tablet TAKE 1 TABLET BY MOUTH EVERY DAY 90 tablet 1    TRELEGY ELLIPTA 100-62.5-25 MCG/ACT AEPB inhaler TAKE 1 PUFF BY MOUTH EVERY  each 1    atorvastatin (LIPITOR) 80 MG tablet Take 1 tablet by mouth daily 90 tablet 1    carvedilol (COREG) 6.25 MG tablet TAKE 1 TABLET BY MOUTH TWICE A  tablet 1    tamsulosin (FLOMAX) 0.4 MG capsule TAKE 1 CAPSULE BY MOUTH EVERY DAY 90 capsule 1    albuterol sulfate HFA (PROVENTIL;VENTOLIN;PROAIR) 108 (90 Base) MCG/ACT inhaler INHALE 2 PUFFS INTO LUNGS EVERY 4 HOURS AS NEEDED FOR WHEEZING OR SHORTNESS OF BREATH 18 each 2    aspirin 81 MG EC tablet Take 1 tablet by mouth daily 90 tablet 1    melatonin 3 MG TABS tablet Take 1 tablet by mouth nightly as needed (sleep)  3     No current facility-administered medications for this visit.     Past Medical History:   Diagnosis Date    Abdominal aortic aneurysm (AAA) without rupture 04/18/2018    CT abdomen 4/2018 showed 3.2 cm AAA.  It was normal on CT abdomen in 10/2016 4/8/2019 US : 4.5 x 4.3 cm size : increased significantly. Vascular consultation and recheck in one year. CT abdomen in 5/2019 : 3.3 cm : seen Dr James Malhotra. Recheck in one year     Acute gout of left ankle 02/02/2017    Resolved. Uric acid improved after d/c HCTZ    Bilateral carotid artery disease 11/04/2016    10/2016: Left ICA 50-69% and right ICA less than 50%

## 2025-05-23 DIAGNOSIS — J44.9 COPD, SEVERE (HCC): ICD-10-CM

## 2025-05-23 RX ORDER — ALBUTEROL SULFATE 90 UG/1
INHALANT RESPIRATORY (INHALATION)
Qty: 6.7 EACH | Refills: 2 | Status: SHIPPED | OUTPATIENT
Start: 2025-05-23

## 2025-05-23 RX ORDER — TAMSULOSIN HYDROCHLORIDE 0.4 MG/1
CAPSULE ORAL DAILY
Qty: 90 CAPSULE | Refills: 1 | Status: SHIPPED | OUTPATIENT
Start: 2025-05-23

## 2025-05-24 RX ORDER — ATORVASTATIN CALCIUM 80 MG/1
80 TABLET, FILM COATED ORAL DAILY
Qty: 90 TABLET | Refills: 1 | Status: SHIPPED | OUTPATIENT
Start: 2025-05-24

## 2025-06-24 ENCOUNTER — OFFICE VISIT (OUTPATIENT)
Dept: CARDIOLOGY CLINIC | Age: 60
End: 2025-06-24
Payer: COMMERCIAL

## 2025-06-24 VITALS
HEIGHT: 70 IN | SYSTOLIC BLOOD PRESSURE: 118 MMHG | HEART RATE: 68 BPM | WEIGHT: 247 LBS | DIASTOLIC BLOOD PRESSURE: 74 MMHG | BODY MASS INDEX: 35.36 KG/M2

## 2025-06-24 DIAGNOSIS — I10 ESSENTIAL HYPERTENSION: ICD-10-CM

## 2025-06-24 DIAGNOSIS — E66.9 OBESITY (BMI 30-39.9): ICD-10-CM

## 2025-06-24 DIAGNOSIS — E78.2 MIXED HYPERLIPIDEMIA: ICD-10-CM

## 2025-06-24 DIAGNOSIS — G47.33 OSA (OBSTRUCTIVE SLEEP APNEA): ICD-10-CM

## 2025-06-24 DIAGNOSIS — Z72.0 TOBACCO ABUSE DISORDER: ICD-10-CM

## 2025-06-24 DIAGNOSIS — I25.10 CORONARY ARTERY DISEASE INVOLVING NATIVE CORONARY ARTERY OF NATIVE HEART WITHOUT ANGINA PECTORIS: Primary | ICD-10-CM

## 2025-06-24 PROCEDURE — 3078F DIAST BP <80 MM HG: CPT | Performed by: INTERNAL MEDICINE

## 2025-06-24 PROCEDURE — 99214 OFFICE O/P EST MOD 30 MIN: CPT | Performed by: INTERNAL MEDICINE

## 2025-06-24 PROCEDURE — 93000 ELECTROCARDIOGRAM COMPLETE: CPT | Performed by: INTERNAL MEDICINE

## 2025-06-24 PROCEDURE — 3074F SYST BP LT 130 MM HG: CPT | Performed by: INTERNAL MEDICINE

## 2025-06-24 NOTE — PATIENT INSTRUCTIONS
Patient is counseled for smoking cessation and refraining from passive nicotine exposure. I have offered multiple approaches and support to accomplish this.    Counseled for calorie counting, reduction in serving size and exercise and lifestyle modification for weight loss.  Appropriate prescriptions if needed on this visit are addressed. After visit summery is provided.   Questions answered and patient verbalizes understanding. Follow up in 6 months,  sooner if needed.

## 2025-06-24 NOTE — PROGRESS NOTES
Suleman Mon  1965  Dena Cote MD      Chief Complaint   Patient presents with    Follow-up     Patient denies chest pain, palpitations, edema and dizziness. Patient states has some finger numbness.     Shortness of Breath     Chief complaint and HPI:  Suleman Mon  is a 59 y.o. male following up for coronary artery disease status post bypass surgery in 2016 has hypertension hyperlipidemia ongoing tobacco abuse.  Has obstructive sleep apnea not able to tolerate CPAP therapy.  He is compliant with his medications.  He works 50 hours a week and stays busy but does not exercise.  Medications reviewed and reconciled.  Continues to smoke 1 pack of cigarettes daily.  He is reporting some numbness in the left 4th and 5th fingers.  Rest of the Cardiovascular system review is otherwise unchanged from prior encounter.  Past medical history:  has a past medical history of Abdominal aortic aneurysm (AAA) without rupture, Acute gout of left ankle, Bilateral carotid artery disease, COPD, severe (HCC), Coronary artery disease involving native coronary artery of native heart without angina pectoris, COVID, H/O colonoscopy, H/O Doppler ultrasound, History of nuclear stress test, Hyperlipidemia, Hypertension, Incidental lung nodule 4 mm RUL. f/u in 10/15, Kidney stone on right side, Nephrolithiasis, JOSE MANUEL (obstructive sleep apnea), Polycythemia, Right shoulder pain, S/P split thickness skin graft, Squamous papilloma, and Tobacco abuse disorder.  Past surgical history:  has a past surgical history that includes Kidney stone surgery; hernia repair; Colonoscopy (03/2015); Cardiac surgery (10/24/2016); and Colonoscopy.  Social History:   Social History     Tobacco Use    Smoking status: Every Day     Current packs/day: 1.00     Average packs/day: 1 pack/day for 42.5 years (42.5 ttl pk-yrs)     Types: Cigarettes     Start date: 1983    Smokeless tobacco: Never   Substance Use Topics    Alcohol use: Yes     Alcohol/week: 6.0

## 2025-06-24 NOTE — ASSESSMENT & PLAN NOTE
He continues to smoke 1 pack of cigarettes daily.  We have counseled him once again to cut back on cigarette smoking and goal should be to quit completely.  Help was offered.

## 2025-07-14 ENCOUNTER — OFFICE VISIT (OUTPATIENT)
Age: 60
End: 2025-07-14

## 2025-07-14 ENCOUNTER — PATIENT MESSAGE (OUTPATIENT)
Age: 60
End: 2025-07-14

## 2025-07-14 VITALS
OXYGEN SATURATION: 93 % | BODY MASS INDEX: 34.84 KG/M2 | HEART RATE: 70 BPM | SYSTOLIC BLOOD PRESSURE: 112 MMHG | HEIGHT: 70 IN | WEIGHT: 243.4 LBS | DIASTOLIC BLOOD PRESSURE: 82 MMHG | RESPIRATION RATE: 18 BRPM

## 2025-07-14 DIAGNOSIS — Z87.891 PERSONAL HISTORY OF TOBACCO USE: Primary | ICD-10-CM

## 2025-07-14 DIAGNOSIS — I71.43 INFRARENAL ABDOMINAL AORTIC ANEURYSM (AAA) WITHOUT RUPTURE: ICD-10-CM

## 2025-07-14 DIAGNOSIS — N40.0 BENIGN PROSTATIC HYPERPLASIA WITHOUT LOWER URINARY TRACT SYMPTOMS: ICD-10-CM

## 2025-07-14 DIAGNOSIS — Z72.0 TOBACCO ABUSE DISORDER: ICD-10-CM

## 2025-07-14 DIAGNOSIS — I77.9 BILATERAL CAROTID ARTERY DISEASE, UNSPECIFIED TYPE: ICD-10-CM

## 2025-07-14 DIAGNOSIS — I10 ESSENTIAL HYPERTENSION: ICD-10-CM

## 2025-07-14 DIAGNOSIS — E78.2 MIXED HYPERLIPIDEMIA: ICD-10-CM

## 2025-07-14 DIAGNOSIS — R73.03 PREDIABETES: ICD-10-CM

## 2025-07-14 DIAGNOSIS — R20.0 NUMBNESS OF FINGERS: ICD-10-CM

## 2025-07-14 DIAGNOSIS — G47.33 OSA (OBSTRUCTIVE SLEEP APNEA): ICD-10-CM

## 2025-07-14 DIAGNOSIS — I25.10 CORONARY ARTERY DISEASE INVOLVING NATIVE CORONARY ARTERY OF NATIVE HEART WITHOUT ANGINA PECTORIS: ICD-10-CM

## 2025-07-14 DIAGNOSIS — D75.1 POLYCYTHEMIA: ICD-10-CM

## 2025-07-14 DIAGNOSIS — J44.9 COPD, SEVERE (HCC): ICD-10-CM

## 2025-07-14 SDOH — ECONOMIC STABILITY: FOOD INSECURITY: WITHIN THE PAST 12 MONTHS, THE FOOD YOU BOUGHT JUST DIDN'T LAST AND YOU DIDN'T HAVE MONEY TO GET MORE.: NEVER TRUE

## 2025-07-14 SDOH — ECONOMIC STABILITY: FOOD INSECURITY: WITHIN THE PAST 12 MONTHS, YOU WORRIED THAT YOUR FOOD WOULD RUN OUT BEFORE YOU GOT MONEY TO BUY MORE.: NEVER TRUE

## 2025-07-14 NOTE — PROGRESS NOTES
Suleman Mno  Patient's  is 1965  Seen in office on 2025      SUBJECTIVE:  Suleman stark 59 y.o.year old male presents today   Chief Complaint   Patient presents with    COPD     History of Present Illness  The patient is a 59-year-old male who presents for evaluation of numbness in the 4th and 5th fingers on the left hand.    He has been experiencing numbness in these fingers for approximately a month. This numbness is particularly noticeable when he sleeps or rests his head on his arm. He reports no tenderness but notes that these fingers do not function as well as the others. He has not had any recent injury to the elbow.    He has a history of coronary artery disease, having suffered a myocardial infarction in 2016 and undergoing quadruple coronary artery bypass grafting at that time. He continues to take statins, aspirin, and beta-blockers. He is currently on carvedilol 6.25 mg twice daily for blood pressure management and adheres to a low-salt diet. He has discontinued lisinopril but did not report a specific reason for stopping it.    For his COPD, he uses Trelegy and albuterol once daily, which he finds beneficial. He is due for a lung CT scan. He continues to smoke but is attempting to reduce his consumption to half a pack per day.    He has mild bilateral carotid disease but has not undergone any recent testing for this condition. He is under the care of Dr. Castellon for an aneurysm and is scheduled for a follow-up visit every six months. He reports no neck symptoms.    He has not had any recent phlebotomies for his polycythemia and has not seen Dr. Wood recently.    He is developing prediabetes and is advised to lose weight through diet and exercise.    He is on atorvastatin 80 mg daily for high cholesterol.    Tobacco: The patient continues to smoke but is attempting to reduce his consumption to half a pack per day.  Sleep: The patient notices numbness in his fingers particularly when he

## 2025-07-14 NOTE — PROGRESS NOTES
Discussed with the patient the current USPSTF guidelines released March 9, 2021 for screening for lung cancer.    For adults aged 50 to 80 years who have a 20 pack-year smoking history and currently smoke or have quit within the past 15 years the grade B recommendation is to:  Screen for lung cancer with low-dose computed tomography (LDCT) every year.  Stop screening once a person has not smoked for 15 years or has a health problem that limits life expectancy or the ability to have lung surgery.    The patient  reports that he has been smoking cigarettes. He started smoking about 42 years ago. He has a 42.5 pack-year smoking history. He has never used smokeless tobacco.. Discussed with patient the risks and benefits of screening, including over-diagnosis, false positive rate, and total radiation exposure.  The patient currently exhibits no signs or symptoms suggestive of lung cancer.  Discussed with patient the importance of compliance with yearly annual lung cancer screenings and willingness to undergo diagnosis and treatment if screening scan is positive.  In addition, the patient was counseled regarding the importance of remaining smoke free and/or total smoking cessation.    Also reviewed the following if the patient has Medicare that as of February 10, 2022, Medicare only covers LDCT screening in patients aged 50-77 with at least a 20 pack-year smoking history who currently smoke or have quit in the last 15 years      Patient is having symptoms of ulnar nerve entrapment.  He has left digit 4-5 tingling.  Elbow pain.  He is dropping things.  Right hand dom.

## 2025-07-24 ENCOUNTER — TELEPHONE (OUTPATIENT)
Dept: CARDIOTHORACIC SURGERY | Age: 60
End: 2025-07-24

## 2025-07-24 NOTE — TELEPHONE ENCOUNTER
Left message advising CTA is scheduled for 07/31/25, arrival 8:30am to [facility] in [place], advised he will need to be NPO 4 hours prior, asked that he call back to confirm

## 2025-07-31 ENCOUNTER — HOSPITAL ENCOUNTER (OUTPATIENT)
Dept: CT IMAGING | Age: 60
Discharge: HOME | End: 2025-07-31
Payer: COMMERCIAL

## 2025-07-31 ENCOUNTER — HOSPITAL ENCOUNTER (OUTPATIENT)
Dept: LAB | Age: 60
Discharge: HOME | End: 2025-07-31
Payer: COMMERCIAL

## 2025-07-31 DIAGNOSIS — I71.40 ABDOMINAL AORTIC ANEURYSM (AAA) WITHOUT RUPTURE, UNSPECIFIED PART: ICD-10-CM

## 2025-07-31 LAB
EGFR, POC: >90 ML/MIN/1.73M2
POC CREATININE: 0.9 MG/DL (ref 0.5–1.2)

## 2025-07-31 PROCEDURE — 6360000004 HC RX CONTRAST MEDICATION: Performed by: PHYSICIAN ASSISTANT

## 2025-07-31 PROCEDURE — 74174 CTA ABD&PLVS W/CONTRAST: CPT

## 2025-07-31 PROCEDURE — 82565 ASSAY OF CREATININE: CPT

## 2025-07-31 RX ORDER — IOPAMIDOL 755 MG/ML
100 INJECTION, SOLUTION INTRAVASCULAR
Status: COMPLETED | OUTPATIENT
Start: 2025-07-31 | End: 2025-07-31

## 2025-07-31 RX ADMIN — IOPAMIDOL 100 ML: 755 INJECTION, SOLUTION INTRAVENOUS at 08:25

## 2025-08-07 ENCOUNTER — OFFICE VISIT (OUTPATIENT)
Dept: CARDIOTHORACIC SURGERY | Age: 60
End: 2025-08-07
Payer: COMMERCIAL

## 2025-08-07 VITALS
SYSTOLIC BLOOD PRESSURE: 114 MMHG | DIASTOLIC BLOOD PRESSURE: 66 MMHG | WEIGHT: 244 LBS | BODY MASS INDEX: 34.93 KG/M2 | HEIGHT: 70 IN | HEART RATE: 74 BPM | OXYGEN SATURATION: 96 %

## 2025-08-07 DIAGNOSIS — I71.43 INFRARENAL ABDOMINAL AORTIC ANEURYSM (AAA) WITHOUT RUPTURE: Primary | ICD-10-CM

## 2025-08-07 DIAGNOSIS — I71.40 ABDOMINAL AORTIC ANEURYSM (AAA) WITHOUT RUPTURE, UNSPECIFIED PART: ICD-10-CM

## 2025-08-07 PROCEDURE — 3074F SYST BP LT 130 MM HG: CPT | Performed by: THORACIC SURGERY (CARDIOTHORACIC VASCULAR SURGERY)

## 2025-08-07 PROCEDURE — 3078F DIAST BP <80 MM HG: CPT | Performed by: THORACIC SURGERY (CARDIOTHORACIC VASCULAR SURGERY)

## 2025-08-07 PROCEDURE — 99214 OFFICE O/P EST MOD 30 MIN: CPT | Performed by: THORACIC SURGERY (CARDIOTHORACIC VASCULAR SURGERY)

## 2025-08-15 DIAGNOSIS — I10 ESSENTIAL HYPERTENSION: ICD-10-CM

## 2025-08-18 RX ORDER — AMLODIPINE BESYLATE 5 MG/1
5 TABLET ORAL DAILY
Qty: 90 TABLET | Refills: 1 | Status: SHIPPED | OUTPATIENT
Start: 2025-08-18

## 2025-08-18 RX ORDER — FLUTICASONE FUROATE, UMECLIDINIUM BROMIDE AND VILANTEROL TRIFENATATE 100; 62.5; 25 UG/1; UG/1; UG/1
POWDER RESPIRATORY (INHALATION)
Qty: 180 EACH | Refills: 1 | Status: SHIPPED | OUTPATIENT
Start: 2025-08-18

## 2025-08-18 RX ORDER — CARVEDILOL 6.25 MG/1
6.25 TABLET ORAL 2 TIMES DAILY
Qty: 180 TABLET | Refills: 1 | Status: SHIPPED | OUTPATIENT
Start: 2025-08-18